# Patient Record
Sex: MALE | Race: WHITE | Employment: OTHER | URBAN - NONMETROPOLITAN AREA
[De-identification: names, ages, dates, MRNs, and addresses within clinical notes are randomized per-mention and may not be internally consistent; named-entity substitution may affect disease eponyms.]

---

## 2017-01-03 RX ORDER — DOFETILIDE 0.25 MG/1
CAPSULE ORAL
Qty: 360 CAPSULE | Refills: 3 | Status: SHIPPED | OUTPATIENT
Start: 2017-01-03 | End: 2018-01-15 | Stop reason: SDUPTHER

## 2017-02-02 ENCOUNTER — OFFICE VISIT (OUTPATIENT)
Dept: URGENT CARE | Age: 62
End: 2017-02-02
Payer: COMMERCIAL

## 2017-02-02 VITALS
HEART RATE: 84 BPM | DIASTOLIC BLOOD PRESSURE: 83 MMHG | WEIGHT: 299 LBS | RESPIRATION RATE: 20 BRPM | OXYGEN SATURATION: 98 % | HEIGHT: 71 IN | BODY MASS INDEX: 41.86 KG/M2 | SYSTOLIC BLOOD PRESSURE: 127 MMHG | TEMPERATURE: 97.9 F

## 2017-02-02 DIAGNOSIS — R05.9 COUGH: ICD-10-CM

## 2017-02-02 DIAGNOSIS — J01.40 SUBACUTE PANSINUSITIS: Primary | ICD-10-CM

## 2017-02-02 PROCEDURE — 99202 OFFICE O/P NEW SF 15 MIN: CPT | Performed by: NURSE PRACTITIONER

## 2017-02-02 RX ORDER — METOLAZONE 2.5 MG/1
2.5 TABLET ORAL DAILY
COMMUNITY
End: 2017-12-25 | Stop reason: SDUPTHER

## 2017-02-02 RX ORDER — DOFETILIDE 0.25 MG/1
500 CAPSULE ORAL 2 TIMES DAILY
COMMUNITY

## 2017-02-02 RX ORDER — FUROSEMIDE 40 MG/1
40 TABLET ORAL 2 TIMES DAILY
COMMUNITY
End: 2018-07-10 | Stop reason: SDUPTHER

## 2017-02-02 RX ORDER — PROPYLTHIOURACIL 50 MG/1
TABLET ORAL 3 TIMES DAILY
COMMUNITY
End: 2017-10-31 | Stop reason: SDUPTHER

## 2017-02-02 RX ORDER — SPIRONOLACTONE 25 MG/1
25 TABLET ORAL DAILY
COMMUNITY
End: 2017-07-16 | Stop reason: SDUPTHER

## 2017-02-02 RX ORDER — WARFARIN SODIUM 5 MG/1
5 TABLET ORAL
COMMUNITY
End: 2017-07-26

## 2017-02-02 RX ORDER — METHYLPREDNISOLONE 4 MG/1
TABLET ORAL
Qty: 1 KIT | Refills: 0 | Status: SHIPPED | OUTPATIENT
Start: 2017-02-02 | End: 2017-02-08

## 2017-02-02 RX ORDER — LEVALBUTEROL INHALATION SOLUTION 1.25 MG/3ML
1 SOLUTION RESPIRATORY (INHALATION) EVERY 4 HOURS PRN
COMMUNITY

## 2017-02-02 RX ORDER — CEFDINIR 300 MG/1
300 CAPSULE ORAL 2 TIMES DAILY
Qty: 20 CAPSULE | Refills: 0 | Status: SHIPPED | OUTPATIENT
Start: 2017-02-02 | End: 2017-02-12

## 2017-02-02 RX ORDER — AMLODIPINE BESYLATE 2.5 MG/1
2.5 TABLET ORAL DAILY
COMMUNITY
End: 2017-10-31

## 2017-02-02 RX ORDER — METOPROLOL SUCCINATE 50 MG/1
100 TABLET, EXTENDED RELEASE ORAL 2 TIMES DAILY
COMMUNITY

## 2017-02-02 RX ORDER — ATORVASTATIN CALCIUM 20 MG/1
20 TABLET, FILM COATED ORAL DAILY
COMMUNITY
End: 2017-09-27 | Stop reason: SDUPTHER

## 2017-02-02 RX ORDER — POTASSIUM CHLORIDE 750 MG/1
10 TABLET, FILM COATED, EXTENDED RELEASE ORAL DAILY
COMMUNITY
End: 2017-08-01 | Stop reason: SDUPTHER

## 2017-02-02 RX ORDER — LATANOPROST 50 UG/ML
1 SOLUTION/ DROPS OPHTHALMIC NIGHTLY
COMMUNITY

## 2017-02-02 RX ORDER — DESONIDE 0.5 MG/G
CREAM TOPICAL 2 TIMES DAILY
COMMUNITY
End: 2018-12-05 | Stop reason: SDUPTHER

## 2017-02-02 RX ORDER — BENZONATATE 100 MG/1
100 CAPSULE ORAL 3 TIMES DAILY PRN
Qty: 21 CAPSULE | Refills: 0 | Status: SHIPPED | OUTPATIENT
Start: 2017-02-02 | End: 2017-02-09

## 2017-02-02 RX ORDER — LEVALBUTEROL TARTRATE 45 UG/1
1-2 AEROSOL, METERED ORAL EVERY 4 HOURS PRN
COMMUNITY
End: 2017-10-31

## 2017-02-02 RX ORDER — CITALOPRAM 20 MG/1
20 TABLET ORAL DAILY
COMMUNITY
End: 2017-11-06 | Stop reason: SDUPTHER

## 2017-02-02 ASSESSMENT — ENCOUNTER SYMPTOMS
VOMITING: 0
SINUS PRESSURE: 1
NAUSEA: 0
COUGH: 1
DIARRHEA: 0

## 2017-05-22 LAB
INR BLD: 2.34 (ref 0.88–1.18)
PROTHROMBIN TIME: 25.9 SEC (ref 12–14.6)

## 2017-06-13 LAB
INR BLD: 3.08 (ref 0.88–1.18)
PROTHROMBIN TIME: 32.2 SEC (ref 12–14.6)

## 2017-07-05 ENCOUNTER — NURSE ONLY (OUTPATIENT)
Dept: INTERNAL MEDICINE | Age: 62
End: 2017-07-05
Payer: COMMERCIAL

## 2017-07-05 DIAGNOSIS — Z79.01 LONG TERM (CURRENT) USE OF ANTICOAGULANTS: ICD-10-CM

## 2017-07-05 LAB
INTERNATIONAL NORMALIZATION RATIO, POC: 3.9
PROTHROMBIN TIME, POC: NORMAL

## 2017-07-05 PROCEDURE — 85610 PROTHROMBIN TIME: CPT | Performed by: INTERNAL MEDICINE

## 2017-07-25 RX ORDER — MAGNESIUM OXIDE 400 MG/1
400 TABLET ORAL 2 TIMES DAILY
COMMUNITY

## 2017-07-26 ENCOUNTER — ANTI-COAG VISIT (OUTPATIENT)
Dept: INTERNAL MEDICINE | Age: 62
End: 2017-07-26

## 2017-07-26 ENCOUNTER — OFFICE VISIT (OUTPATIENT)
Dept: INTERNAL MEDICINE | Age: 62
End: 2017-07-26
Payer: COMMERCIAL

## 2017-07-26 VITALS
HEIGHT: 71 IN | WEIGHT: 292 LBS | DIASTOLIC BLOOD PRESSURE: 84 MMHG | BODY MASS INDEX: 40.88 KG/M2 | SYSTOLIC BLOOD PRESSURE: 118 MMHG | OXYGEN SATURATION: 98 % | HEART RATE: 79 BPM

## 2017-07-26 DIAGNOSIS — E05.90 HYPERTHYROIDISM: ICD-10-CM

## 2017-07-26 DIAGNOSIS — I10 ESSENTIAL HYPERTENSION: ICD-10-CM

## 2017-07-26 DIAGNOSIS — I48.19 PERSISTENT ATRIAL FIBRILLATION (HCC): ICD-10-CM

## 2017-07-26 DIAGNOSIS — Z79.01 LONG TERM (CURRENT) USE OF ANTICOAGULANTS: ICD-10-CM

## 2017-07-26 DIAGNOSIS — I42.2 CARDIOMYOPATHY, HYPERTROPHIC (HCC): ICD-10-CM

## 2017-07-26 DIAGNOSIS — Z23 NEED FOR ZOSTAVAX ADMINISTRATION: Primary | ICD-10-CM

## 2017-07-26 LAB
ALBUMIN SERPL-MCNC: 4.2 G/DL (ref 3.5–5.2)
ALP BLD-CCNC: 74 U/L (ref 40–130)
ALT SERPL-CCNC: 28 U/L (ref 5–41)
ANION GAP SERPL CALCULATED.3IONS-SCNC: 18 MMOL/L (ref 7–19)
AST SERPL-CCNC: 40 U/L (ref 5–40)
BASOPHILS ABSOLUTE: 0.1 K/UL (ref 0–0.2)
BASOPHILS RELATIVE PERCENT: 1.3 % (ref 0–1)
BILIRUB SERPL-MCNC: 2.2 MG/DL (ref 0.2–1.2)
BUN BLDV-MCNC: 19 MG/DL (ref 8–23)
CALCIUM SERPL-MCNC: 9.7 MG/DL (ref 8.8–10.2)
CHLORIDE BLD-SCNC: 95 MMOL/L (ref 98–111)
CO2: 30 MMOL/L (ref 22–29)
CREAT SERPL-MCNC: 0.8 MG/DL (ref 0.5–1.2)
EOSINOPHILS ABSOLUTE: 0.2 K/UL (ref 0–0.6)
EOSINOPHILS RELATIVE PERCENT: 2.9 % (ref 0–5)
GFR NON-AFRICAN AMERICAN: >60
GLUCOSE BLD-MCNC: 93 MG/DL (ref 74–109)
HCT VFR BLD CALC: 45.1 % (ref 42–52)
HEMOGLOBIN: 15.7 G/DL (ref 14–18)
INR BLD: 3.33 (ref 0.88–1.18)
LDL CHOLESTEROL DIRECT: 56 MG/DL
LYMPHOCYTES ABSOLUTE: 1.1 K/UL (ref 1.1–4.5)
LYMPHOCYTES RELATIVE PERCENT: 17 % (ref 20–40)
MCH RBC QN AUTO: 32 PG (ref 27–31)
MCHC RBC AUTO-ENTMCNC: 34.8 G/DL (ref 33–37)
MCV RBC AUTO: 91.9 FL (ref 80–94)
MONOCYTES ABSOLUTE: 0.8 K/UL (ref 0–0.9)
MONOCYTES RELATIVE PERCENT: 12.2 % (ref 0–10)
NEUTROPHILS ABSOLUTE: 4.2 K/UL (ref 1.5–7.5)
NEUTROPHILS RELATIVE PERCENT: 66.1 % (ref 50–65)
PDW BLD-RTO: 14.1 % (ref 11.5–14.5)
PLATELET # BLD: 199 K/UL (ref 130–400)
PMV BLD AUTO: 10.4 FL (ref 9.4–12.4)
POTASSIUM SERPL-SCNC: 3.2 MMOL/L (ref 3.5–5)
PROTHROMBIN TIME: 34.3 SEC (ref 12–14.6)
RBC # BLD: 4.91 M/UL (ref 4.7–6.1)
SODIUM BLD-SCNC: 143 MMOL/L (ref 136–145)
TOTAL PROTEIN: 7.7 G/DL (ref 6.6–8.7)
TSH SERPL DL<=0.05 MIU/L-ACNC: 0.91 UIU/ML (ref 0.27–4.2)
WBC # BLD: 6.3 K/UL (ref 4.8–10.8)

## 2017-07-26 PROCEDURE — 99214 OFFICE O/P EST MOD 30 MIN: CPT | Performed by: INTERNAL MEDICINE

## 2017-07-26 PROCEDURE — 90471 IMMUNIZATION ADMIN: CPT | Performed by: INTERNAL MEDICINE

## 2017-07-26 PROCEDURE — 90736 HZV VACCINE LIVE SUBQ: CPT | Performed by: INTERNAL MEDICINE

## 2017-07-26 RX ORDER — WARFARIN SODIUM 2.5 MG/1
2.5 TABLET ORAL
COMMUNITY
End: 2017-09-20 | Stop reason: SDUPTHER

## 2017-07-26 RX ORDER — CHOLECALCIFEROL (VITAMIN D3) 125 MCG
5 CAPSULE ORAL DAILY
COMMUNITY

## 2017-07-26 ASSESSMENT — ENCOUNTER SYMPTOMS
SORE THROAT: 0
COUGH: 0
NAUSEA: 0
ABDOMINAL PAIN: 0
RHINORRHEA: 0
TROUBLE SWALLOWING: 0
SHORTNESS OF BREATH: 1

## 2017-07-26 ASSESSMENT — PATIENT HEALTH QUESTIONNAIRE - PHQ9
SUM OF ALL RESPONSES TO PHQ9 QUESTIONS 1 & 2: 0
1. LITTLE INTEREST OR PLEASURE IN DOING THINGS: 0
SUM OF ALL RESPONSES TO PHQ QUESTIONS 1-9: 0
2. FEELING DOWN, DEPRESSED OR HOPELESS: 0

## 2017-08-01 RX ORDER — POTASSIUM CHLORIDE 750 MG/1
TABLET, FILM COATED, EXTENDED RELEASE ORAL
Qty: 180 TABLET | Refills: 5 | Status: SHIPPED | OUTPATIENT
Start: 2017-08-01 | End: 2017-12-13 | Stop reason: SDUPTHER

## 2017-08-11 ENCOUNTER — OFFICE VISIT (OUTPATIENT)
Dept: INTERNAL MEDICINE | Age: 62
End: 2017-08-11
Payer: COMMERCIAL

## 2017-08-11 ENCOUNTER — HOSPITAL ENCOUNTER (OUTPATIENT)
Dept: NON INVASIVE DIAGNOSTICS | Age: 62
Discharge: HOME | End: 2017-08-11
Payer: COMMERCIAL

## 2017-08-11 VITALS
TEMPERATURE: 96.7 F | BODY MASS INDEX: 42.06 KG/M2 | WEIGHT: 300.4 LBS | RESPIRATION RATE: 18 BRPM | HEART RATE: 78 BPM | DIASTOLIC BLOOD PRESSURE: 84 MMHG | HEIGHT: 71 IN | SYSTOLIC BLOOD PRESSURE: 132 MMHG | OXYGEN SATURATION: 96 %

## 2017-08-11 DIAGNOSIS — M79.606 PAIN OF LOWER EXTREMITY, UNSPECIFIED LATERALITY: ICD-10-CM

## 2017-08-11 DIAGNOSIS — M79.89 LEG SWELLING: Primary | ICD-10-CM

## 2017-08-11 PROCEDURE — 99214 OFFICE O/P EST MOD 30 MIN: CPT | Performed by: NURSE PRACTITIONER

## 2017-08-11 PROCEDURE — 93971 EXTREMITY STUDY: CPT

## 2017-08-11 RX ORDER — CEFDINIR 300 MG/1
300 CAPSULE ORAL 2 TIMES DAILY
Qty: 14 CAPSULE | Refills: 0 | Status: SHIPPED | OUTPATIENT
Start: 2017-08-11 | End: 2017-08-18

## 2017-08-11 ASSESSMENT — ENCOUNTER SYMPTOMS
CHOKING: 0
COLOR CHANGE: 1
CONSTIPATION: 0
ABDOMINAL PAIN: 0
BLOOD IN STOOL: 0
DIARRHEA: 0
ABDOMINAL DISTENTION: 0
COUGH: 0
EYE DISCHARGE: 0
WHEEZING: 0
NAUSEA: 0
TROUBLE SWALLOWING: 0
STRIDOR: 0
EYE ITCHING: 0
SORE THROAT: 0
VOMITING: 0

## 2017-08-15 ENCOUNTER — ANTI-COAG VISIT (OUTPATIENT)
Dept: INTERNAL MEDICINE | Age: 62
End: 2017-08-15

## 2017-08-15 ENCOUNTER — NURSE ONLY (OUTPATIENT)
Dept: INTERNAL MEDICINE | Age: 62
End: 2017-08-15
Payer: COMMERCIAL

## 2017-08-15 DIAGNOSIS — Z79.01 LONG TERM (CURRENT) USE OF ANTICOAGULANTS: Primary | ICD-10-CM

## 2017-08-15 DIAGNOSIS — Z95.2 MITRAL VALVE REPLACED: ICD-10-CM

## 2017-08-15 DIAGNOSIS — Z79.01 LONG TERM (CURRENT) USE OF ANTICOAGULANTS: ICD-10-CM

## 2017-08-15 LAB
INTERNATIONAL NORMALIZATION RATIO, POC: 3
PROTHROMBIN TIME, POC: NORMAL

## 2017-08-15 PROCEDURE — 85610 PROTHROMBIN TIME: CPT | Performed by: INTERNAL MEDICINE

## 2017-08-15 RX ORDER — METOPROLOL SUCCINATE 50 MG/1
75 TABLET, EXTENDED RELEASE ORAL 2 TIMES DAILY
COMMUNITY

## 2017-08-15 RX ORDER — DESONIDE 0.5 MG/G
1 CREAM TOPICAL 2 TIMES DAILY
COMMUNITY

## 2017-08-15 RX ORDER — POTASSIUM CHLORIDE 20 MEQ/1
20 TABLET, EXTENDED RELEASE ORAL 2 TIMES DAILY
COMMUNITY

## 2017-08-15 RX ORDER — WARFARIN SODIUM 2 MG/1
2.5 TABLET ORAL
COMMUNITY

## 2017-08-15 RX ORDER — PROPYLTHIOURACIL 50 MG/1
50 TABLET ORAL DAILY
COMMUNITY

## 2017-08-15 RX ORDER — ATORVASTATIN CALCIUM 20 MG/1
20 TABLET, FILM COATED ORAL DAILY
COMMUNITY

## 2017-08-15 RX ORDER — LEVALBUTEROL TARTRATE 45 UG/1
1-2 AEROSOL, METERED ORAL EVERY 4 HOURS PRN
COMMUNITY

## 2017-08-15 RX ORDER — SPIRONOLACTONE 25 MG/1
25 TABLET ORAL DAILY
COMMUNITY

## 2017-08-15 RX ORDER — LATANOPROST 50 UG/ML
1 SOLUTION/ DROPS OPHTHALMIC NIGHTLY
COMMUNITY

## 2017-08-15 RX ORDER — METOLAZONE 2.5 MG/1
2.5 TABLET ORAL DAILY
COMMUNITY

## 2017-08-15 RX ORDER — FUROSEMIDE 80 MG
80 TABLET ORAL DAILY
COMMUNITY

## 2017-08-15 RX ORDER — AMLODIPINE BESYLATE 5 MG/1
5 TABLET ORAL DAILY
COMMUNITY
End: 2017-08-17 | Stop reason: ALTCHOICE

## 2017-08-15 RX ORDER — CITALOPRAM 20 MG/1
20 TABLET ORAL DAILY
COMMUNITY

## 2017-08-17 ENCOUNTER — OFFICE VISIT (OUTPATIENT)
Dept: CARDIOLOGY | Facility: CLINIC | Age: 62
End: 2017-08-17

## 2017-08-17 VITALS
BODY MASS INDEX: 40.32 KG/M2 | SYSTOLIC BLOOD PRESSURE: 110 MMHG | DIASTOLIC BLOOD PRESSURE: 82 MMHG | HEART RATE: 82 BPM | WEIGHT: 288 LBS | HEIGHT: 71 IN | OXYGEN SATURATION: 100 %

## 2017-08-17 DIAGNOSIS — I48.20 CHRONIC ATRIAL FIBRILLATION (HCC): ICD-10-CM

## 2017-08-17 DIAGNOSIS — R42 DIZZINESS: ICD-10-CM

## 2017-08-17 DIAGNOSIS — R06.02 SHORTNESS OF BREATH: ICD-10-CM

## 2017-08-17 DIAGNOSIS — I42.2 HYPERTROPHIC CARDIOMYOPATHY (HCC): Primary | ICD-10-CM

## 2017-08-17 PROCEDURE — 99214 OFFICE O/P EST MOD 30 MIN: CPT | Performed by: INTERNAL MEDICINE

## 2017-08-17 PROCEDURE — 93000 ELECTROCARDIOGRAM COMPLETE: CPT | Performed by: INTERNAL MEDICINE

## 2017-08-17 RX ORDER — PHENOL 1.4 %
10 AEROSOL, SPRAY (ML) MUCOUS MEMBRANE NIGHTLY
COMMUNITY

## 2017-08-17 NOTE — PROGRESS NOTES
Subjective:     Encounter Date: 08/17/2017      Patient ID: Pranay Gutierrez is a 61 y.o. male.With a history of hypertrophic cardiomyopathy, chronic atrial fibrillation, status post multiple previous ablations, followed by Dr. Perez, pacemaker in place that is also followed by Dr. Perez, history of mitral valve replacement, on chronic Coumadin who presents today for follow-up.    Chief Complaint: Routine follow-up    Atrial Fibrillation   Presents for follow-up visit. Symptoms include dizziness, palpitations and shortness of breath. Symptoms are negative for chest pain, hemodynamic instability, pacemaker problem, syncope and weakness. The symptoms have been stable. Past medical history includes atrial fibrillation. There are no medication compliance problems.   Shortness of Breath   This is a chronic problem. The current episode started more than 1 month ago. The problem has been gradually worsening. Pertinent negatives include no abdominal pain, chest pain, claudication, fever, headaches, hemoptysis, leg swelling, neck pain, orthopnea, PND, sore throat, syncope, vomiting or wheezing. The symptoms are aggravated by exercise. He has tried rest for the symptoms. The treatment provided significant relief. His past medical history is significant for chronic lung disease. There is no history of a heart failure.   Dizziness   This is a new problem. The current episode started more than 1 month ago. The problem occurs intermittently. The problem has been waxing and waning. Pertinent negatives include no abdominal pain, chest pain, chills, congestion, coughing, diaphoresis, fatigue, fever, headaches, nausea, neck pain, numbness, sore throat, vertigo, visual change, vomiting or weakness. The symptoms are aggravated by exertion. He has tried rest for the symptoms.      The patient presents today for routine follow-up.  He was last evaluated in this office approximately one year ago.  Since that time, the patient has had  progressive shortness of breath and dyspnea on exertion as well as intermittent lightheadedness and dizziness which are new complaints since last year.  The patient denies any syncopal or so.  The patient has occasional palpitations but long periods of tachyarrhythmias per his knowledge.  He has been compliant with all of his medications including anticoagulation which he takes given a history of a mitral valve replacement.  He has not had any difficulties with his pacemaker.  He denies any significant change in baseline mild lower extremity edema which occurs intermittently.  His weight is been relatively stable.  He denies any chest pain.      Current Outpatient Prescriptions:   •  atorvastatin (LIPITOR) 20 MG tablet, Take 20 mg by mouth Daily., Disp: , Rfl:   •  citalopram (CeleXA) 20 MG tablet, Take 20 mg by mouth Daily., Disp: , Rfl:   •  desonide (DESOWEN) 0.05 % cream, Apply 1 application topically 2 (Two) Times a Day., Disp: , Rfl:   •  dofetilide (TIKOSYN) 250 MCG capsule, TAKE 2 CAPSULES BY MOUTH 2 TIMES DAILY, Disp: 360 capsule, Rfl: 3  •  furosemide (LASIX) 80 MG tablet, Take 80 mg by mouth Daily., Disp: , Rfl:   •  latanoprost (XALATAN) 0.005 % ophthalmic solution, 1 drop Every Night., Disp: , Rfl:   •  levalbuterol (XOPENEX HFA) 45 MCG/ACT inhaler, Inhale 1-2 puffs Every 4 (Four) Hours As Needed for Wheezing., Disp: , Rfl:   •  magnesium oxide (MAGOX) 400 (241.3 Mg) MG tablet tablet, Take 400 mg by mouth 2 (Two) Times a Day., Disp: , Rfl:   •  Melatonin 10 MG tablet, Take 10 mg by mouth Every Night., Disp: , Rfl:   •  metOLazone (ZAROXOLYN) 2.5 MG tablet, Take 2.5 mg by mouth Daily. 1 TABLET PO EVERY Monday, Wednesday & Friday, Disp: , Rfl:   •  metoprolol succinate XL (TOPROL-XL) 50 MG 24 hr tablet, Take 75 mg by mouth 2 (Two) Times a Day., Disp: , Rfl:   •  Mometasone Furoate (ASMANEX HFA) 200 MCG/ACT aerosol, Inhale 2 puffs 2 (Two) Times a Day., Disp: , Rfl:   •  potassium chloride (K-DUR,KLOR-CON)  20 MEQ CR tablet, Take 20 mEq by mouth 2 (Two) Times a Day. 30 mg am & 30 mg pm, Disp: , Rfl:   •  propylthiouracil (PTU) 50 MG tablet, Take 50 mg by mouth Daily., Disp: , Rfl:   •  spironolactone (ALDACTONE) 25 MG tablet, Take 25 mg by mouth Daily., Disp: , Rfl:   •  warfarin (COUMADIN) 2 MG tablet, Take 2 mg by mouth Daily., Disp: , Rfl:     Allergies   Allergen Reactions   • Penicillins    • Sulfa Antibiotics      Social History   Substance Use Topics   • Smoking status: Never Smoker   • Smokeless tobacco: Never Used   • Alcohol use No     Review of Systems   Constitution: Negative for chills, diaphoresis, fatigue, fever, weakness, night sweats and weight loss.   HENT: Negative for congestion, headaches and sore throat.    Cardiovascular: Positive for dyspnea on exertion and palpitations. Negative for chest pain, claudication, irregular heartbeat, leg swelling, orthopnea, paroxysmal nocturnal dyspnea and syncope.   Respiratory: Positive for shortness of breath. Negative for cough, hemoptysis and wheezing.    Endocrine: Negative for cold intolerance, heat intolerance, polydipsia and polyuria.   Hematologic/Lymphatic: Negative for bleeding problem. Does not bruise/bleed easily.   Musculoskeletal: Negative for neck pain.   Gastrointestinal: Negative for abdominal pain, hematemesis, hematochezia, melena, nausea and vomiting.   Genitourinary: Negative for bladder incontinence, dysuria and hematuria.   Neurological: Positive for dizziness and light-headedness. Negative for loss of balance, numbness, paresthesias, seizures and vertigo.       ECG 12 Lead  Date/Time: 8/17/2017 12:51 PM  Performed by: SOTO VILLA  Authorized by: SOTO VILAL   Rhythm: paced  BPM: 65  Clinical impression: abnormal ECG             Objective:     Physical Exam   Constitutional: He is oriented to person, place, and time. He appears well-developed and well-nourished. No distress.   HENT:   Head: Normocephalic and  "atraumatic.   Mouth/Throat: Oropharynx is clear and moist.   Eyes: EOM are normal. Pupils are equal, round, and reactive to light.   Neck: Normal range of motion. Neck supple. No JVD present. No thyromegaly present.   Cardiovascular: Normal rate, regular rhythm, S1 normal, S2 normal, normal heart sounds and intact distal pulses.  Exam reveals no gallop and no friction rub.    No murmur heard.  Pulmonary/Chest: Effort normal and breath sounds normal.   Abdominal: Soft. Bowel sounds are normal. He exhibits no distension. There is no tenderness.   Musculoskeletal: Normal range of motion. He exhibits no edema.   Neurological: He is alert and oriented to person, place, and time. No cranial nerve deficit.   Skin: Skin is warm and dry. No rash noted. No cyanosis or erythema. Nails show no clubbing.   Psychiatric: He has a normal mood and affect.   Vitals reviewed.    /82 (BP Location: Left arm, Patient Position: Sitting)  Pulse 82  Ht 71\" (180.3 cm)  Wt 288 lb (131 kg)  SpO2 100%  BMI 40.17 kg/m2    Data/Lab Review:     Echocardiogram on 8/23/16: Preserved left ventricular ejection fraction with evidence of diastolic dysfunction and septal hypertrophy, mild tricuspid regurgitation, mitral valve gradient within normal limits.      Assessment:          Diagnosis Plan   1. Hypertrophic cardiomyopathy  Adult Transthoracic Echo Complete With Contrast   2. Dizziness     3. Shortness of breath     4. Chronic atrial fibrillation  ECG 12 Lead        Plan:       1.  Hypertrophic cardiomyopathy: Since the last office visit with this patient approximately one year ago, he has developed progressive shortness of breath and intermittent dizziness.  With his history of hypertrophic cardiomyopathy, there is concern for worsening left ventricular outflow obstruction.  Therefore, think it is reasonable to repeat an echocardiogram.  His most recent echocardiogram last year is referenced above.  Further plans will be pending the " results of this echocardiogram.    2.  Dizziness: Until proven otherwise, I would think that it is possible that the patient's dizziness may be related to his history of hypertrophic cardiomyopathy and potential left ventricular outflow obstruction.  No murmur was auscultated on examination today.  We will check an echocardiogram as noted above.  She also be noted that given the patient's chronic atrial fibrillation and on Tikosyn therapy, the patient will follow-up with Dr. Perez soon.  I am not certain that the patient's atrial fibrillation has any significant effect on the patient's recent dizziness.    3.  Shortness of breath: The patient has increasing shortness of breath and dyspnea on exertion.  His lungs are clear on examination today and I do not auscultate a murmur.  However with this patient's history of hypertrophic artery myopathy and will repeat an echocardiogram to evaluate further.  Patient also has history of mitral valve replacement so this should also be evaluated although at last check, the gradient across the patient's mitral valve was appropriate.    4.  Chronic atrial fibrillation: As stated patient has follow-up with Dr. Perez in the near future.  He has had multiple ablations done, most recently at the [facility].  The patient also has a pacemaker that is followed by Dr. Perez.    Follow-up: 12 months unless needed sooner based on the results the patient's echocardiogram or if symptoms worsen.    EMR Dragon/Transcription disclaimer: Much of this encounter note is an electronic transcription/translation of spoken language to printed text. The electronic translation of spoken language may permit erroneous, or at times, nonsensical words or phrases to be inadvertently transcribed; although I have reviewed the note for such errors, some may still exist.

## 2017-08-25 ENCOUNTER — HOSPITAL ENCOUNTER (OUTPATIENT)
Dept: CARDIOLOGY | Facility: HOSPITAL | Age: 62
Discharge: HOME | End: 2017-08-25
Attending: INTERNAL MEDICINE | Admitting: INTERNAL MEDICINE

## 2017-08-25 VITALS
BODY MASS INDEX: 40.32 KG/M2 | DIASTOLIC BLOOD PRESSURE: 81 MMHG | HEIGHT: 71 IN | WEIGHT: 288 LBS | SYSTOLIC BLOOD PRESSURE: 127 MMHG

## 2017-08-25 DIAGNOSIS — I42.2 HYPERTROPHIC CARDIOMYOPATHY (HCC): ICD-10-CM

## 2017-08-25 PROCEDURE — 25010000002 PERFLUTREN 6.52 MG/ML SUSPENSION: Performed by: INTERNAL MEDICINE

## 2017-08-25 PROCEDURE — C8929 TTE W OR WO FOL WCON,DOPPLER: HCPCS

## 2017-08-25 PROCEDURE — 93306 TTE W/DOPPLER COMPLETE: CPT | Performed by: INTERNAL MEDICINE

## 2017-08-25 RX ADMIN — PERFLUTREN 8.48 MG: 6.52 INJECTION, SUSPENSION INTRAVENOUS at 10:16

## 2017-08-28 LAB
BH CV ECHO MEAS - AO MAX PG (FULL): 2.1 MMHG
BH CV ECHO MEAS - AO MAX PG: 5.6 MMHG
BH CV ECHO MEAS - AO MEAN PG (FULL): 1 MMHG
BH CV ECHO MEAS - AO MEAN PG: 3 MMHG
BH CV ECHO MEAS - AO ROOT AREA (BSA CORRECTED): 1.2
BH CV ECHO MEAS - AO ROOT AREA: 6.6 CM^2
BH CV ECHO MEAS - AO ROOT DIAM: 2.9 CM
BH CV ECHO MEAS - AO V2 MAX: 118 CM/SEC
BH CV ECHO MEAS - AO V2 MEAN: 82 CM/SEC
BH CV ECHO MEAS - AO V2 VTI: 25.2 CM
BH CV ECHO MEAS - AVA(I,A): 2.2 CM^2
BH CV ECHO MEAS - AVA(I,D): 2.2 CM^2
BH CV ECHO MEAS - AVA(V,A): 2.2 CM^2
BH CV ECHO MEAS - AVA(V,D): 2.2 CM^2
BH CV ECHO MEAS - BSA(HAYCOCK): 2.6 M^2
BH CV ECHO MEAS - BSA: 2.5 M^2
BH CV ECHO MEAS - BZI_BMI: 40.2 KILOGRAMS/M^2
BH CV ECHO MEAS - BZI_METRIC_HEIGHT: 180.3 CM
BH CV ECHO MEAS - BZI_METRIC_WEIGHT: 130.6 KG
BH CV ECHO MEAS - CONTRAST EF 4CH: 40.5 ML/M^2
BH CV ECHO MEAS - EDV(CUBED): 9.9 ML
BH CV ECHO MEAS - EDV(MOD-SP4): 139 ML
BH CV ECHO MEAS - EDV(TEICH): 15.2 ML
BH CV ECHO MEAS - EF(CUBED): 70 %
BH CV ECHO MEAS - EF(TEICH): 64.5 %
BH CV ECHO MEAS - ESV(CUBED): 3 ML
BH CV ECHO MEAS - ESV(MOD-SP4): 82.7 ML
BH CV ECHO MEAS - ESV(TEICH): 5.4 ML
BH CV ECHO MEAS - FS: 33.1 %
BH CV ECHO MEAS - IVS/LVPW: 3.6
BH CV ECHO MEAS - IVSD: 3.9 CM
BH CV ECHO MEAS - LA DIMENSION: 4.4 CM
BH CV ECHO MEAS - LA/AO: 1.5
BH CV ECHO MEAS - LAT PEAK E' VEL: 5.9 CM/SEC
BH CV ECHO MEAS - LV DIASTOLIC VOL/BSA (35-75): 56.4 ML/M^2
BH CV ECHO MEAS - LV MASS(C)D: 290.2 GRAMS
BH CV ECHO MEAS - LV MASS(C)DI: 117.9 GRAMS/M^2
BH CV ECHO MEAS - LV MAX PG: 3.5 MMHG
BH CV ECHO MEAS - LV MEAN PG: 2 MMHG
BH CV ECHO MEAS - LV SYSTOLIC VOL/BSA (12-30): 33.6 ML/M^2
BH CV ECHO MEAS - LV V1 MAX: 93.5 CM/SEC
BH CV ECHO MEAS - LV V1 MEAN: 63.4 CM/SEC
BH CV ECHO MEAS - LV V1 VTI: 19.9 CM
BH CV ECHO MEAS - LVIDD: 2.1 CM
BH CV ECHO MEAS - LVIDS: 1.4 CM
BH CV ECHO MEAS - LVLD AP4: 8.4 CM
BH CV ECHO MEAS - LVLS AP4: 8.1 CM
BH CV ECHO MEAS - LVOT AREA (M): 2.8 CM^2
BH CV ECHO MEAS - LVOT AREA: 2.8 CM^2
BH CV ECHO MEAS - LVOT DIAM: 1.9 CM
BH CV ECHO MEAS - LVPWD: 1.1 CM
BH CV ECHO MEAS - MED PEAK E' VEL: 4.9 CM/SEC
BH CV ECHO MEAS - MV DEC SLOPE: 802 CM/SEC^2
BH CV ECHO MEAS - MV DEC TIME: 0.21 SEC
BH CV ECHO MEAS - MV E MAX VEL: 155 CM/SEC
BH CV ECHO MEAS - MV MAX PG: 9.9 MMHG
BH CV ECHO MEAS - MV MEAN PG: 4 MMHG
BH CV ECHO MEAS - MV P1/2T MAX VEL: 174 CM/SEC
BH CV ECHO MEAS - MV P1/2T: 63.5 MSEC
BH CV ECHO MEAS - MV V2 MAX: 157 CM/SEC
BH CV ECHO MEAS - MV V2 MEAN: 88.5 CM/SEC
BH CV ECHO MEAS - MV V2 VTI: 27.9 CM
BH CV ECHO MEAS - MVA P1/2T LCG: 1.3 CM^2
BH CV ECHO MEAS - MVA(P1/2T): 3.5 CM^2
BH CV ECHO MEAS - MVA(VTI): 2 CM^2
BH CV ECHO MEAS - RAP SYSTOLE: 5 MMHG
BH CV ECHO MEAS - RVDD: 1.4 CM
BH CV ECHO MEAS - RVSP: 28.4 MMHG
BH CV ECHO MEAS - SI(AO): 67.6 ML/M^2
BH CV ECHO MEAS - SI(CUBED): 2.8 ML/M^2
BH CV ECHO MEAS - SI(LVOT): 22.9 ML/M^2
BH CV ECHO MEAS - SI(MOD-SP4): 22.9 ML/M^2
BH CV ECHO MEAS - SI(TEICH): 4 ML/M^2
BH CV ECHO MEAS - SV(AO): 166.5 ML
BH CV ECHO MEAS - SV(CUBED): 6.9 ML
BH CV ECHO MEAS - SV(LVOT): 56.4 ML
BH CV ECHO MEAS - SV(MOD-SP4): 56.3 ML
BH CV ECHO MEAS - SV(TEICH): 9.8 ML
BH CV ECHO MEAS - TR MAX VEL: 242 CM/SEC
E/E' RATIO: 31.6
LEFT ATRIUM VOLUME INDEX: 43.1 ML/M2
LEFT ATRIUM VOLUME: 106 CM3

## 2017-09-06 ENCOUNTER — NURSE ONLY (OUTPATIENT)
Dept: INTERNAL MEDICINE | Age: 62
End: 2017-09-06
Payer: COMMERCIAL

## 2017-09-06 ENCOUNTER — ANTI-COAG VISIT (OUTPATIENT)
Dept: INTERNAL MEDICINE | Age: 62
End: 2017-09-06

## 2017-09-06 DIAGNOSIS — Z79.01 LONG TERM (CURRENT) USE OF ANTICOAGULANTS: ICD-10-CM

## 2017-09-06 DIAGNOSIS — Z79.01 LONG TERM (CURRENT) USE OF ANTICOAGULANTS: Primary | ICD-10-CM

## 2017-09-06 LAB
INTERNATIONAL NORMALIZATION RATIO, POC: 4.4
PROTHROMBIN TIME, POC: NORMAL

## 2017-09-06 PROCEDURE — 85610 PROTHROMBIN TIME: CPT | Performed by: INTERNAL MEDICINE

## 2017-09-20 ENCOUNTER — ANTI-COAG VISIT (OUTPATIENT)
Dept: INTERNAL MEDICINE | Age: 62
End: 2017-09-20

## 2017-09-20 ENCOUNTER — NURSE ONLY (OUTPATIENT)
Dept: INTERNAL MEDICINE | Age: 62
End: 2017-09-20
Payer: COMMERCIAL

## 2017-09-20 DIAGNOSIS — Z79.01 LONG TERM (CURRENT) USE OF ANTICOAGULANTS: Primary | ICD-10-CM

## 2017-09-20 DIAGNOSIS — Z95.2 MITRAL VALVE REPLACED: ICD-10-CM

## 2017-09-20 DIAGNOSIS — Z79.01 LONG TERM (CURRENT) USE OF ANTICOAGULANTS: ICD-10-CM

## 2017-09-20 LAB
INTERNATIONAL NORMALIZATION RATIO, POC: 3.8
PROTHROMBIN TIME, POC: NORMAL

## 2017-09-20 PROCEDURE — 85610 PROTHROMBIN TIME: CPT | Performed by: INTERNAL MEDICINE

## 2017-09-21 RX ORDER — WARFARIN SODIUM 2.5 MG/1
TABLET ORAL
Qty: 90 TABLET | Refills: 4 | Status: SHIPPED | OUTPATIENT
Start: 2017-09-21 | End: 2018-12-21 | Stop reason: SDUPTHER

## 2017-10-04 ENCOUNTER — TELEPHONE (OUTPATIENT)
Dept: INTERNAL MEDICINE | Age: 62
End: 2017-10-04

## 2017-10-05 ENCOUNTER — ANTI-COAG VISIT (OUTPATIENT)
Dept: INTERNAL MEDICINE | Age: 62
End: 2017-10-05

## 2017-10-05 ENCOUNTER — TELEPHONE (OUTPATIENT)
Dept: INTERNAL MEDICINE | Age: 62
End: 2017-10-05

## 2017-10-05 ENCOUNTER — NURSE ONLY (OUTPATIENT)
Dept: INTERNAL MEDICINE | Age: 62
End: 2017-10-05
Payer: COMMERCIAL

## 2017-10-05 DIAGNOSIS — Z79.01 LONG TERM (CURRENT) USE OF ANTICOAGULANTS: ICD-10-CM

## 2017-10-05 DIAGNOSIS — Z79.01 LONG TERM (CURRENT) USE OF ANTICOAGULANTS: Primary | ICD-10-CM

## 2017-10-05 DIAGNOSIS — Z95.2 MITRAL VALVE REPLACED: ICD-10-CM

## 2017-10-05 DIAGNOSIS — I48.91 ATRIAL FIBRILLATION, UNSPECIFIED TYPE (HCC): ICD-10-CM

## 2017-10-05 LAB
INTERNATIONAL NORMALIZATION RATIO, POC: 4
PROTHROMBIN TIME, POC: NORMAL

## 2017-10-05 PROCEDURE — 85610 PROTHROMBIN TIME: CPT | Performed by: INTERNAL MEDICINE

## 2017-10-18 DIAGNOSIS — Z79.01 LONG TERM CURRENT USE OF ANTICOAGULANT THERAPY: Primary | ICD-10-CM

## 2017-10-18 DIAGNOSIS — Z79.01 LONG TERM CURRENT USE OF ANTICOAGULANT THERAPY: ICD-10-CM

## 2017-10-18 LAB
INR BLD: 3.19 (ref 0.88–1.18)
PROTHROMBIN TIME: 33.1 SEC (ref 12–14.6)

## 2017-10-31 ENCOUNTER — OFFICE VISIT (OUTPATIENT)
Dept: INTERNAL MEDICINE | Age: 62
End: 2017-10-31
Payer: COMMERCIAL

## 2017-10-31 VITALS
BODY MASS INDEX: 44.56 KG/M2 | SYSTOLIC BLOOD PRESSURE: 132 MMHG | HEIGHT: 68 IN | WEIGHT: 294 LBS | DIASTOLIC BLOOD PRESSURE: 84 MMHG | OXYGEN SATURATION: 98 % | HEART RATE: 76 BPM

## 2017-10-31 DIAGNOSIS — Z95.2 MITRAL VALVE REPLACED: Primary | ICD-10-CM

## 2017-10-31 DIAGNOSIS — I48.19 PERSISTENT ATRIAL FIBRILLATION (HCC): ICD-10-CM

## 2017-10-31 DIAGNOSIS — K76.9 CHRONIC LIVER DISEASE: ICD-10-CM

## 2017-10-31 DIAGNOSIS — Z95.810 AICD (AUTOMATIC CARDIOVERTER/DEFIBRILLATOR) PRESENT: ICD-10-CM

## 2017-10-31 DIAGNOSIS — Z79.01 LONG TERM CURRENT USE OF ANTICOAGULANT THERAPY: ICD-10-CM

## 2017-10-31 DIAGNOSIS — E05.90 HYPERTHYROIDISM: ICD-10-CM

## 2017-10-31 DIAGNOSIS — Z95.2 MITRAL VALVE REPLACED: ICD-10-CM

## 2017-10-31 DIAGNOSIS — Z78.9 STATIN INTOLERANCE: ICD-10-CM

## 2017-10-31 LAB
ALBUMIN SERPL-MCNC: 4.5 G/DL (ref 3.5–5.2)
ALP BLD-CCNC: 78 U/L (ref 40–130)
ALT SERPL-CCNC: 23 U/L (ref 5–41)
ANION GAP SERPL CALCULATED.3IONS-SCNC: 15 MMOL/L (ref 7–19)
AST SERPL-CCNC: 39 U/L (ref 5–40)
BILIRUB SERPL-MCNC: 2.2 MG/DL (ref 0.2–1.2)
BUN BLDV-MCNC: 17 MG/DL (ref 8–23)
CALCIUM SERPL-MCNC: 9.9 MG/DL (ref 8.8–10.2)
CHLORIDE BLD-SCNC: 93 MMOL/L (ref 98–111)
CO2: 34 MMOL/L (ref 22–29)
CREAT SERPL-MCNC: 0.9 MG/DL (ref 0.5–1.2)
GFR NON-AFRICAN AMERICAN: >60
GLUCOSE BLD-MCNC: 84 MG/DL (ref 74–109)
HCT VFR BLD CALC: 44.8 % (ref 42–52)
HEMOGLOBIN: 14.9 G/DL (ref 14–18)
MCH RBC QN AUTO: 31.6 PG (ref 27–31)
MCHC RBC AUTO-ENTMCNC: 33.3 G/DL (ref 33–37)
MCV RBC AUTO: 94.9 FL (ref 80–94)
PDW BLD-RTO: 14 % (ref 11.5–14.5)
PLATELET # BLD: 214 K/UL (ref 130–400)
PMV BLD AUTO: 10.3 FL (ref 9.4–12.4)
POTASSIUM SERPL-SCNC: 3.3 MMOL/L (ref 3.5–5)
RBC # BLD: 4.72 M/UL (ref 4.7–6.1)
SODIUM BLD-SCNC: 142 MMOL/L (ref 136–145)
TOTAL PROTEIN: 7.8 G/DL (ref 6.6–8.7)
TSH SERPL DL<=0.05 MIU/L-ACNC: 0.99 UIU/ML (ref 0.27–4.2)
WBC # BLD: 7.2 K/UL (ref 4.8–10.8)

## 2017-10-31 PROCEDURE — 99214 OFFICE O/P EST MOD 30 MIN: CPT | Performed by: INTERNAL MEDICINE

## 2017-10-31 RX ORDER — PROPYLTHIOURACIL 50 MG/1
50 TABLET ORAL DAILY
Qty: 30 TABLET | Refills: 5
Start: 2017-10-31 | End: 2018-09-19 | Stop reason: SDUPTHER

## 2017-10-31 RX ORDER — PROPYLTHIOURACIL 50 MG/1
50 TABLET ORAL 3 TIMES DAILY
Qty: 30 TABLET | Refills: 5 | Status: SHIPPED | OUTPATIENT
Start: 2017-10-31 | End: 2017-10-31 | Stop reason: SDUPTHER

## 2017-10-31 ASSESSMENT — ENCOUNTER SYMPTOMS
TROUBLE SWALLOWING: 0
COUGH: 0
SHORTNESS OF BREATH: 1
RHINORRHEA: 0
NAUSEA: 0
ABDOMINAL PAIN: 0
SORE THROAT: 0

## 2017-10-31 NOTE — PATIENT INSTRUCTIONS
candy, desserts, and soda pop. Limit alcohol  · Limit alcohol to no more than 2 drinks a day for men and 1 drink a day for women. Too much alcohol can cause health problems. When should you call for help? Watch closely for changes in your health, and be sure to contact your doctor if:  · You would like help planning heart-healthy meals. Where can you learn more? Go to https://[employer].healthSun Number. org and sign in to your [employer] account. Enter V137 in the [employer] box to learn more about \"Heart-Healthy Diet: Care Instructions. \"     If you do not have an account, please click on the \"Sign Up Now\" link. Current as of: April 3, 2017  Content Version: 11.3  © 0627-1516 [email or web address]. Care instructions adapted under license by [facility] ([facility]). If you have questions about a medical condition or this instruction, always ask your healthcare professional. [place] [address] any warranty or liability for your use of this information.

## 2017-11-07 RX ORDER — CITALOPRAM 20 MG/1
TABLET ORAL
Qty: 30 TABLET | Refills: 4 | Status: SHIPPED | OUTPATIENT
Start: 2017-11-07 | End: 2018-04-09 | Stop reason: SDUPTHER

## 2017-11-21 ENCOUNTER — ANTI-COAG VISIT (OUTPATIENT)
Dept: INTERNAL MEDICINE | Age: 62
End: 2017-11-21

## 2017-11-21 ENCOUNTER — NURSE ONLY (OUTPATIENT)
Dept: INTERNAL MEDICINE | Age: 62
End: 2017-11-21
Payer: COMMERCIAL

## 2017-11-21 DIAGNOSIS — Z79.01 LONG TERM CURRENT USE OF ANTICOAGULANT THERAPY: ICD-10-CM

## 2017-11-21 DIAGNOSIS — I48.19 PERSISTENT ATRIAL FIBRILLATION (HCC): ICD-10-CM

## 2017-11-21 DIAGNOSIS — Z79.01 LONG TERM CURRENT USE OF ANTICOAGULANT THERAPY: Primary | ICD-10-CM

## 2017-11-21 LAB
INR BLD: 3
INTERNATIONAL NORMALIZATION RATIO, POC: NORMAL
PROTHROMBIN TIME, POC: 3

## 2017-11-21 PROCEDURE — 85610 PROTHROMBIN TIME: CPT | Performed by: INTERNAL MEDICINE

## 2017-12-11 ENCOUNTER — TELEPHONE (OUTPATIENT)
Dept: INTERNAL MEDICINE | Age: 62
End: 2017-12-11

## 2017-12-11 NOTE — TELEPHONE ENCOUNTER
I would just tell him to get some artificial tears, 3 or 4 times a day for now.   If he wants to run by here, one can look at them, to determine if he needs to see an ophthalmologist

## 2017-12-11 NOTE — TELEPHONE ENCOUNTER
On the corner of one of his eyes feels like a water blister, he can feel and see it when he blinks his eye, states is a little painful, went to a walk in clinic but they dont treat eyes, he wants to know if we can give him eye drops?  Or what to do

## 2017-12-12 ENCOUNTER — NURSE ONLY (OUTPATIENT)
Dept: INTERNAL MEDICINE | Age: 62
End: 2017-12-12
Payer: COMMERCIAL

## 2017-12-12 ENCOUNTER — ANTI-COAG VISIT (OUTPATIENT)
Dept: INTERNAL MEDICINE | Age: 62
End: 2017-12-12

## 2017-12-12 DIAGNOSIS — Z79.01 LONG TERM CURRENT USE OF ANTICOAGULANT THERAPY: Primary | ICD-10-CM

## 2017-12-12 DIAGNOSIS — Z79.01 LONG TERM CURRENT USE OF ANTICOAGULANT THERAPY: ICD-10-CM

## 2017-12-12 LAB
INTERNATIONAL NORMALIZATION RATIO, POC: 3.8
PROTHROMBIN TIME, POC: NORMAL

## 2017-12-12 PROCEDURE — 85610 PROTHROMBIN TIME: CPT | Performed by: INTERNAL MEDICINE

## 2017-12-12 NOTE — PROGRESS NOTES
Mr. Teresa Samuel was here today. INR today: 3.8       INR Goal: 2.5-3.5    Dosing Plan  As of 12/12/2017    TTR:   57.3 % (5 mo)   Full instructions:   12/12: 1.25 mg; 12/13: 1.25 mg; Otherwise 3.75 mg on Mon; 2.5 mg all other days           Per Verbal orders Dr. Katie Mccullough: Decrease dose by half x 2 days, then resume. PLAN: Take 1.25mg tonight and tomorrow, then resume normal dose. NEXT COUMADIN CLINIC APT IS: 12/19/17 @ 11:45am         [facility] INTERNAL [facility]  [address] [facility]  The major complication associated with warfarin is bleeding due to excessive anticoagulation. Excessive bleeding, or hemorrhage, can occur from any area of the body, and patients on warfarin should report any falls or accidents, as well as signs or symptoms of bleeding or unusual bruising. Signs of unusual bleeding include bleeding from the gums, blood in the urine, bloody or dark stool, a nosebleed, or vomiting blood. Because the risk of bleeding increases as the INR rises, the INR is closely monitored and adjustments are made as needed to maintain the INR within the target range. Darrall Telma also cause skin necrosis or gangrene, which can cause dark red or black areas on the skin. This is a rare complication that may occur during the first several days of warfarin therapy. When to seek help  If there are obvious or subtle signs of bleeding, including the following, patients should call their healthcare provider immediately.   · Persistent nausea, stomach upset, or vomiting blood or other material that looks like coffee grounds   · Headaches, dizziness, or weakness   · Nosebleeds   · Dark red or brown urine   · Blood in the bowel movement or dark-colored stool   · Pain, discomfort, or swelling, especially after an injury   · After a serious fall or head injury, even if there are no other symptoms  The patient should also call if any of the following occurs:  · Bleeding from the gums after brushing the teeth   · Swelling or pain at an injection site   · Excessive menstrual bleeding or bleeding between menstrual periods   · Diarrhea, vomiting, or inability to eat for more than 24 hours   · Fever (temperature greater than 100.4º F or 38º C)    It is important to remember that warfarin is taken to reduce the risk of a clotting condition(s), such as a deep vein thrombosis or pulmonary embolism. If one or more of these symptoms develops, the patient should seek immediate medical attention. OTHER RECOMMENDATIONS  Take warfarin on a schedule  Warfarin should be taken exactly as directed. Do not increase, decrease, or change the dose unless told to do so by a healthcare provider. If a dose is missed or forgotten, call the prescribing clinician for advice. Warfarin tablets come in different strengths; each is usually a different color, with the amount of warfarin (in milligrams) clearly printed on the tablet. If the color or dose of the tablet appears different than those taken previously, the patient should immediately notify their pharmacist or healthcare provider. Reduce the risk of bleeding  There is a tendency to bleed more easily than usual while taking warfarin. Some simple changes can decrease this risk:  · Use a soft bristle toothbrush   · Floss with waxed floss rather than unwaxed floss   · Shave with an electric razor rather than a blade   · Take care when using sharp objects, such as knives and scissors   · Avoid activities that have a risk of falling or injury (eg, contact sports)  Prevent falls  Falling may significantly increase the risk of bleeding. Taking measures to prevent falls is recommended, and could include the following:  · Remove loose rugs and electrical cords or any other loose items in the home that could lead to tripping, slipping, and falling.    · Ensure that there is adequate lighting in all areas inside and around the home, including stairwells and entrance ways.   · Avoid walking on ice, wet or polished floors, or other potentially slippery surfaces. · Avoid walking on unfamiliar areas outside. Warfarin and food  Some foods and supplements can interfere with warfarin's effectiveness. After being stabilized on a particular warfarin dose, consult a healthcare provider before making major dietary changes (eg, starting a diet to lose weight, starting a nutritional supplement or vitamin). · Vitamin K  Eating an increased amount of foods rich in vitamin K can lower the prothrombin time and INR, making warfarin less effective, and potentially increasing the risk of blood clots. Patients who take warfarin should aim to eat a relatively similar amount of vitamin K each week. Some foods have a high level of vitamin K, including: kale, broccoli, spinach, miriam or turnip greens, lettuce, [place] sprouts, and cabbage (table 1). It is not necessary to avoid these foods. However, the patient should eat a relatively similar amount on a regular basis rather than eating a large serving occasionally. · Cranberry juice  There have been mixed reports on the effect of cranberry juice in people who use warfarin to prevent blood clots. Some experts have reported that drinking cranberry juice while on warfarin can cause significant over-anticoagulation and bleeding [1]. However, a small study found that drinking one eight ounce serving of cranberry juice per day for seven days had no effect on the INR of seven men taking warfarin for atrial fibrillation [2]. It is possible that larger amounts could have a more significant effect. The best advice is probably to avoid consuming large amounts of cranberry juice, and to speak with a healthcare provider regarding any concerns about a possible interaction. · Alcohol  Chronic abuse of alcohol affects the body's ability to handle warfarin. Patients on warfarin therapy should avoid drinking alcohol on a daily basis.  Alcohol should be limited to no more than one to two servings of alcohol occasionally. In addition, drinking excessive amounts of alcohol can increase the risk of injury, and therefore bleeding. Warfarin and medications  A number of medications, herbs, and vitamins can interact with warfarin (table 2 and table 3). This interaction may affect the action of warfarin or the other medication. If warfarin is affected, the dose may need to be adjusted (up or down) to maintain an optimal coagulation effect. Patients who take warfarin should consult with their clinician before taking any new medication, including over-the-counter (non-prescription) drugs, herbal medicines, vitamins, or any other products. Some of the most common over-the-counter pain relievers, including acetaminophen (Tylenol®), aspirin, and nonsteroidal antiinflammatory drugs (such as ibuprofen [Advil®]) and naproxen (Aleve®), enhance the anticoagulant effects of warfarin. Vitamin E may increase the anticoagulant effects of warfarin. Consult a healthcare provider before adding or changing a dose of vitamin E or any other vitamin. Wear medical identification  People who require long-term warfarin should wear a bracelet, necklace, or similar alert tag at all times. If an accident occurs and the person is too ill to explain their condition, this will help responders provide appropriate care. The alert tag should include a list of major medical conditions and the reason warfarin is needed (eg, atrial fibrillation), as well as the name and phone number of an emergency contact. One device, Medic Alert®, provides a toll-free number that emergency medical workers can call to find out a person's medical history, list of medications, family emergency contact numbers, and healthcare provider names and numbers.     GRAPHICS: Table 1  Foods with moderate to high levels of vitamin K  Food name Serving size Vitamin K (micrograms)   High level vitamin K foods   Kale, frozen (cooked or boiled, drained) 1/2 cup 570   Kale, fresh, (cooked or boiled, drained) 1/2 cup 530   Spinach, frozen (cooked or boiled, drained) 1/2 cup 514   Spinach, raw 1 cup 150   Marielena greens, frozen (cooked, drained) 1/2 cup 530   Turnip greens, frozen (cooked, drained) 1/2 cup 425   [place] sprouts, frozen (cooked, drained) 1/2 cup 110   Moderate level vitamin K foods   Asparagus, frozen (cooked, drained) 1/2 cup  4 macedo 72  48   Asparagus, fresh (cooked, drained) 4 macedo 30   Broccoli, frozen (cooked, drained) 1/2 cup 60   Broccoli, fresh (cooked, drained) 1 spear 52   Broccoli, raw 1/2 cup 40   Lettuce (butterhead, [place], karla) 1/2 head 80   Lettuce (iceberg, crisphead) 1/2 head 65   Lettuce (christina, cos) 1 cup 57   Lettuce (green leaf) 1 cup 97   Okra, fresh (cooked, drained) 1/2 cup 32   Okra, frozen (cooked, drained) 1/2 cup 44   Cabbage (cooked, drained) 1/2 cup 73   Cabbage, raw 1/2 cup 21   Cabbage, yolie (raw) 1/2 cup 24   Cabbage, Chinese (cooked, drained) 1/2 cup 28   Coleslaw (fast food-type) 3/4 cup 56   Sauerkraut, canned 1/2 cup 41   Peas, frozen, with pod (cooked, drained) 1/2 cup 24   Peas, fresh, with pod (cooked, drained) 1/2 cup 20   Peas, green, frozen (cooked, drained) 1/2 cup 18   Celery, raw 1/2 cup 17   Beans, green or yellow, fresh (cooked, drained) 1/2 cup 10   Oil, canola 1 tablespoon 17   Oil, olive 1 tablespoon 8   Oil, other (including peanut, sesame, safflower, corn, sunflower, soybean) 1 tablespoon 3 or less   Green tea, brewed in hot water 3.5 ounces 0.3   Data from: [employer]. USDA Nutrient Database for Standard Reference. Nutrient Data Laboratory Home Page, CreditCardRecommendations.ca.

## 2017-12-13 RX ORDER — POTASSIUM CHLORIDE 750 MG/1
TABLET, FILM COATED, EXTENDED RELEASE ORAL
Qty: 360 TABLET | Refills: 2 | Status: SHIPPED | OUTPATIENT
Start: 2017-12-13 | End: 2017-12-15 | Stop reason: SDUPTHER

## 2017-12-15 ENCOUNTER — TELEPHONE (OUTPATIENT)
Dept: INTERNAL MEDICINE | Age: 62
End: 2017-12-15

## 2017-12-15 RX ORDER — POTASSIUM CHLORIDE 750 MG/1
TABLET, FILM COATED, EXTENDED RELEASE ORAL
Qty: 540 TABLET | Refills: 3 | Status: SHIPPED | OUTPATIENT
Start: 2017-12-15

## 2017-12-20 ENCOUNTER — TELEPHONE (OUTPATIENT)
Dept: INTERNAL MEDICINE | Age: 62
End: 2017-12-20

## 2017-12-20 ENCOUNTER — OFFICE VISIT (OUTPATIENT)
Dept: INTERNAL MEDICINE | Age: 62
End: 2017-12-20
Payer: COMMERCIAL

## 2017-12-20 VITALS
BODY MASS INDEX: 42.56 KG/M2 | HEIGHT: 71 IN | DIASTOLIC BLOOD PRESSURE: 80 MMHG | WEIGHT: 304 LBS | SYSTOLIC BLOOD PRESSURE: 124 MMHG | OXYGEN SATURATION: 95 % | HEART RATE: 72 BPM

## 2017-12-20 DIAGNOSIS — H81.20 VESTIBULAR NEURONITIS, UNSPECIFIED LATERALITY: ICD-10-CM

## 2017-12-20 DIAGNOSIS — I48.19 PERSISTENT ATRIAL FIBRILLATION (HCC): ICD-10-CM

## 2017-12-20 DIAGNOSIS — Z79.01 LONG TERM CURRENT USE OF ANTICOAGULANT THERAPY: ICD-10-CM

## 2017-12-20 DIAGNOSIS — Z95.2 MITRAL VALVE REPLACED: ICD-10-CM

## 2017-12-20 DIAGNOSIS — Z79.01 LONG TERM CURRENT USE OF ANTICOAGULANT THERAPY: Primary | ICD-10-CM

## 2017-12-20 LAB
INR BLD: 2.65 (ref 0.88–1.18)
PROTHROMBIN TIME: 28.6 SEC (ref 12–14.6)

## 2017-12-20 PROCEDURE — 99214 OFFICE O/P EST MOD 30 MIN: CPT | Performed by: INTERNAL MEDICINE

## 2017-12-20 RX ORDER — MECLIZINE HYDROCHLORIDE 25 MG/1
25 TABLET ORAL 3 TIMES DAILY PRN
COMMUNITY
End: 2017-12-20 | Stop reason: SDUPTHER

## 2017-12-20 RX ORDER — MECLIZINE HYDROCHLORIDE 25 MG/1
25 TABLET ORAL 3 TIMES DAILY PRN
Qty: 30 TABLET | Refills: 1 | Status: SHIPPED | OUTPATIENT
Start: 2017-12-20

## 2017-12-20 RX ORDER — PROMETHAZINE HYDROCHLORIDE 25 MG/1
25 TABLET ORAL EVERY 8 HOURS PRN
COMMUNITY

## 2017-12-20 RX ORDER — PROMETHAZINE HYDROCHLORIDE 25 MG/1
25 TABLET ORAL EVERY 8 HOURS PRN
Qty: 20 TABLET | Refills: 1 | Status: SHIPPED | OUTPATIENT
Start: 2017-12-20 | End: 2017-12-27

## 2017-12-20 ASSESSMENT — ENCOUNTER SYMPTOMS
TROUBLE SWALLOWING: 0
ABDOMINAL PAIN: 0
RHINORRHEA: 0
COUGH: 0
SHORTNESS OF BREATH: 0
SORE THROAT: 0
NAUSEA: 0

## 2017-12-20 NOTE — PROGRESS NOTES
Relation Age of Onset    High Blood Pressure Mother     Heart Disease Mother     High Blood Pressure Father     Heart Disease Father     Stroke Father     Heart Disease Paternal Aunt     Arrhythmia Paternal Aunt       Social History     Social History    Marital status:      Spouse name: N/A    Number of children: N/A    Years of education: N/A     Occupational History    Not on file. Social History Main Topics    Smoking status: Never Smoker    Smokeless tobacco: Never Used    Alcohol use No    Drug use: No    Sexual activity: Not on file     Other Topics Concern    Not on file     Social History Narrative    No narrative on file      Allergies   Allergen Reactions    Amoxicillin Hives    Penicillins     Sulfa Antibiotics       Current Outpatient Prescriptions   Medication Sig Dispense Refill    promethazine (PHENERGAN) 25 MG tablet Take 25 mg by mouth every 8 hours as needed for Nausea      meclizine (ANTIVERT) 25 MG tablet Take 25 mg by mouth 3 times daily as needed      promethazine (PHENERGAN) 25 MG tablet Take 1 tablet by mouth every 8 hours as needed for Nausea 20 tablet 1    potassium chloride (KLOR-CON) 10 MEQ extended release tablet Take 3 tablets twice daily.  540 tablet 3    citalopram (CELEXA) 20 MG tablet TAKE ONE TABLET BY MOUTH DAILY 30 tablet 4    propylthiouracil (PTU) 50 MG tablet Take 1 tablet by mouth daily 30 tablet 5    atorvastatin (LIPITOR) 20 MG tablet TAKE ONE TABLET BY MOUTH DAILY 90 tablet 3    warfarin (COUMADIN) 2.5 MG tablet TAKE 2 TABLETS BY MOUTH EVERY MONDAY AND TAKE 1 TABLET BY MOUTH DAILY THE REST OF THE WEEK (Patient taking differently: TAKE 1 1/2 TABLETS BY MOUTH EVERY MONDAY AND TAKE 1 TABLET BY MOUTH DAILY THE REST OF THE WEEK) 90 tablet 4    melatonin 5 MG TABS tablet Take 5 mg by mouth daily      magnesium oxide (MAG-OX) 400 MG tablet Take 400 mg by mouth 2 times daily      spironolactone (ALDACTONE) 25 MG tablet TAKE ONE TABLET BY Cardiovascular: Regular rhythm. No murmur heard. Pulmonary/Chest: No respiratory distress. He exhibits no tenderness. Abdominal: He exhibits no mass. There is no tenderness. Musculoskeletal: He exhibits no edema or deformity. Lymphadenopathy:     He has no cervical adenopathy. Neurological: He is alert. No cranial nerve deficit. Skin: Skin is warm. No erythema. repeat blood pressure 124/80, heart rate regular  Neurologic: No focal motor defects are noted, bilateral hand  is excellent. Nurse Only on 12/12/2017   Component Date Value Ref Range Status    INR 12/12/2017 3.8   Final   Anti-coag visit on 11/21/2017   Component Date Value Ref Range Status    INR 11/21/2017 3.00   Final   Nurse Only on 11/21/2017   Component Date Value Ref Range Status    Protime 11/21/2017 3.0   Final       1. Long term current use of anticoagulant therapy    2. Persistent atrial fibrillation ([address]    3. Mitral valve replaced    4. Vestibular neuronitis, unspecified laterality       ED records reviewed, CT scan was negative; labs unremarkable, glucose 117, potassium 3.9, creatinine 1.03, LFTs stable, bilirubin 2.4, hemoglobin 14.4    ASSESSMENT/PLAN  1. This likely represents a vestibular neuronitis. He will continue conservative therapy since he has improved, this will consist of meclizine 25 3 times a day, and promethazine 25 as needed. If this persists we could make an ENT referral. I find no focal neurologic signs. 2. Anticoagulation: Continue Coumadin, INR pending today  3.  Mitral valve replacement/persistent atrial fibrillation: Cardiac status seems stable, continue follow-up with Dr. Fabio Coffey, electrophysiologist, [facility]    Keep March appointment with Luis Enrique Westfall    Current Coumadin dosage is 2.5 mg 5 days a week, 3.75 on Mondays and Thursdays    Orders Placed This Encounter   Procedures    Protime-INR     Standing Status:   Future     Standing Expiration Date:   12/20/2018     Order Specific

## 2017-12-21 ENCOUNTER — TELEPHONE (OUTPATIENT)
Dept: INTERNAL MEDICINE | Age: 62
End: 2017-12-21

## 2017-12-26 RX ORDER — METOLAZONE 2.5 MG/1
TABLET ORAL
Qty: 30 TABLET | Refills: 2 | Status: SHIPPED | OUTPATIENT
Start: 2017-12-26 | End: 2018-08-03 | Stop reason: SDUPTHER

## 2017-12-28 ENCOUNTER — TELEPHONE (OUTPATIENT)
Dept: INTERNAL MEDICINE | Age: 62
End: 2017-12-28

## 2017-12-29 ENCOUNTER — ANTI-COAG VISIT (OUTPATIENT)
Dept: INTERNAL MEDICINE | Age: 62
End: 2017-12-29

## 2017-12-29 ENCOUNTER — NURSE ONLY (OUTPATIENT)
Dept: INTERNAL MEDICINE | Age: 62
End: 2017-12-29
Payer: COMMERCIAL

## 2017-12-29 DIAGNOSIS — Z79.01 LONG TERM CURRENT USE OF ANTICOAGULANT THERAPY: ICD-10-CM

## 2017-12-29 DIAGNOSIS — Z79.01 LONG TERM CURRENT USE OF ANTICOAGULANT THERAPY: Primary | ICD-10-CM

## 2017-12-29 DIAGNOSIS — I48.19 PERSISTENT ATRIAL FIBRILLATION (HCC): ICD-10-CM

## 2017-12-29 LAB
INTERNATIONAL NORMALIZATION RATIO, POC: 3
PROTHROMBIN TIME, POC: NORMAL

## 2017-12-29 PROCEDURE — 85610 PROTHROMBIN TIME: CPT | Performed by: INTERNAL MEDICINE

## 2017-12-29 NOTE — PROGRESS NOTES
Mr. Dinora Ordonez was here today. INR today: 3.0    INR Goal: 2.5-3.5    Dosing Plan  As of 12/29/2017    TTR:   60.4 % (5.6 mo)   Full instructions:   3.75 mg on Mon, Thu; 2.5 mg all other days                 PLAN: CONTINUE CURRENT DOSE    NEXT COUMADIN CLINIC APT IS: 1/9/2018 AT 11:00        [facility]  [phone]    Michael Halt EFFECTS  The major complication associated with warfarin is bleeding due to excessive anticoagulation. Excessive bleeding, or hemorrhage, can occur from any area of the body, and patients on warfarin should report any falls or accidents, as well as signs or symptoms of bleeding or unusual bruising. Signs of unusual bleeding include bleeding from the gums, blood in the urine, bloody or dark stool, a nosebleed, or vomiting blood. Because the risk of bleeding increases as the INR rises, the INR is closely monitored and adjustments are made as needed to maintain the INR within the target range. Yg Hilts also cause skin necrosis or gangrene, which can cause dark red or black areas on the skin. This is a rare complication that may occur during the first several days of warfarin therapy. When to seek help  If there are obvious or subtle signs of bleeding, including the following, patients should call their healthcare provider immediately.   · Persistent nausea, stomach upset, or vomiting blood or other material that looks like coffee grounds   · Headaches, dizziness, or weakness   · Nosebleeds   · Dark red or brown urine   · Blood in the bowel movement or dark-colored stool   · Pain, discomfort, or swelling, especially after an injury   · After a serious fall or head injury, even if there are no other symptoms  The patient should also call if any of the following occurs:  · Bleeding from the gums after brushing the teeth   · Swelling or pain at an injection site   · Excessive menstrual bleeding or bleeding between menstrual periods   · Diarrhea, vomiting, or inability to eat for more than 24 hours   · Fever (temperature greater than 100.4º F or 38º C)    It is important to remember that warfarin is taken to reduce the risk of a clotting condition(s), such as a deep vein thrombosis or pulmonary embolism. If one or more of these symptoms develops, the patient should seek immediate medical attention. OTHER RECOMMENDATIONS  Take warfarin on a schedule  Warfarin should be taken exactly as directed. Do not increase, decrease, or change the dose unless told to do so by a healthcare provider. If a dose is missed or forgotten, call the prescribing clinician for advice. Warfarin tablets come in different strengths; each is usually a different color, with the amount of warfarin (in milligrams) clearly printed on the tablet. If the color or dose of the tablet appears different than those taken previously, the patient should immediately notify their pharmacist or healthcare provider. Reduce the risk of bleeding  There is a tendency to bleed more easily than usual while taking warfarin. Some simple changes can decrease this risk:  · Use a soft bristle toothbrush   · Floss with waxed floss rather than unwaxed floss   · Shave with an electric razor rather than a blade   · Take care when using sharp objects, such as knives and scissors   · Avoid activities that have a risk of falling or injury (eg, contact sports)  Prevent falls  Falling may significantly increase the risk of bleeding. Taking measures to prevent falls is recommended, and could include the following:  · Remove loose rugs and electrical cords or any other loose items in the home that could lead to tripping, slipping, and falling. · Ensure that there is adequate lighting in all areas inside and around the home, including stairwells and entrance ways. · Avoid walking on ice, wet or polished floors, or other potentially slippery surfaces.    · Avoid walking on unfamiliar areas outside. Warfarin and food  Some foods and supplements can interfere with warfarin's effectiveness. After being stabilized on a particular warfarin dose, consult a healthcare provider before making major dietary changes (eg, starting a diet to lose weight, starting a nutritional supplement or vitamin). · Vitamin K  Eating an increased amount of foods rich in vitamin K can lower the prothrombin time and INR, making warfarin less effective, and potentially increasing the risk of blood clots. Patients who take warfarin should aim to eat a relatively similar amount of vitamin K each week. Some foods have a high level of vitamin K, including: kale, broccoli, spinach, miriam or turnip greens, lettuce, [place] sprouts, and cabbage (table 1). It is not necessary to avoid these foods. However, the patient should eat a relatively similar amount on a regular basis rather than eating a large serving occasionally. · Cranberry juice  There have been mixed reports on the effect of cranberry juice in people who use warfarin to prevent blood clots. Some experts have reported that drinking cranberry juice while on warfarin can cause significant over-anticoagulation and bleeding [1]. However, a small study found that drinking one eight ounce serving of cranberry juice per day for seven days had no effect on the INR of seven men taking warfarin for atrial fibrillation [2]. It is possible that larger amounts could have a more significant effect. The best advice is probably to avoid consuming large amounts of cranberry juice, and to speak with a healthcare provider regarding any concerns about a possible interaction. · Alcohol  Chronic abuse of alcohol affects the body's ability to handle warfarin. Patients on warfarin therapy should avoid drinking alcohol on a daily basis. Alcohol should be limited to no more than one to two servings of alcohol occasionally.  In addition, drinking excessive amounts of alcohol can increase the risk of injury, and therefore bleeding. Warfarin and medications  A number of medications, herbs, and vitamins can interact with warfarin (table 2 and table 3). This interaction may affect the action of warfarin or the other medication. If warfarin is affected, the dose may need to be adjusted (up or down) to maintain an optimal coagulation effect. Patients who take warfarin should consult with their clinician before taking any new medication, including over-the-counter (non-prescription) drugs, herbal medicines, vitamins, or any other products. Some of the most common over-the-counter pain relievers, including acetaminophen (Tylenol®), aspirin, and nonsteroidal antiinflammatory drugs (such as ibuprofen [Advil®]) and naproxen (Aleve®), enhance the anticoagulant effects of warfarin. Vitamin E may increase the anticoagulant effects of warfarin. Consult a healthcare provider before adding or changing a dose of vitamin E or any other vitamin. Wear medical identification  People who require long-term warfarin should wear a bracelet, necklace, or similar alert tag at all times. If an accident occurs and the person is too ill to explain their condition, this will help responders provide appropriate care. The alert tag should include a list of major medical conditions and the reason warfarin is needed (eg, atrial fibrillation), as well as the name and phone number of an emergency contact. One device, Medic Alert®, provides a toll-free number that emergency medical workers can call to find out a person's medical history, list of medications, family emergency contact numbers, and healthcare provider names and numbers.     GRAPHICS: Table 1  Foods with moderate to high levels of vitamin K  Food name Serving size Vitamin K (micrograms)   High level vitamin K foods   Kale, frozen (cooked or boiled, drained) 1/2 cup 570   Kale, fresh, (cooked or boiled, drained) 1/2 cup 530   Spinach, frozen (cooked or boiled,

## 2018-01-09 ENCOUNTER — ANTI-COAG VISIT (OUTPATIENT)
Dept: INTERNAL MEDICINE | Age: 63
End: 2018-01-09

## 2018-01-09 ENCOUNTER — NURSE ONLY (OUTPATIENT)
Dept: INTERNAL MEDICINE | Age: 63
End: 2018-01-09
Payer: OTHER GOVERNMENT

## 2018-01-09 DIAGNOSIS — Z79.01 LONG TERM CURRENT USE OF ANTICOAGULANT THERAPY: Primary | ICD-10-CM

## 2018-01-09 DIAGNOSIS — Z79.01 LONG TERM CURRENT USE OF ANTICOAGULANT THERAPY: ICD-10-CM

## 2018-01-09 LAB
INTERNATIONAL NORMALIZATION RATIO, POC: 4
PROTHROMBIN TIME, POC: NORMAL

## 2018-01-09 PROCEDURE — 85610 PROTHROMBIN TIME: CPT | Performed by: INTERNAL MEDICINE

## 2018-01-09 NOTE — PROGRESS NOTES
alcohol can increase the risk of injury, and therefore bleeding. Warfarin and medications  A number of medications, herbs, and vitamins can interact with warfarin (table 2 and table 3). This interaction may affect the action of warfarin or the other medication. If warfarin is affected, the dose may need to be adjusted (up or down) to maintain an optimal coagulation effect. Patients who take warfarin should consult with their clinician before taking any new medication, including over-the-counter (non-prescription) drugs, herbal medicines, vitamins, or any other products. Some of the most common over-the-counter pain relievers, including acetaminophen (Tylenol®), aspirin, and nonsteroidal antiinflammatory drugs (such as ibuprofen [Advil®]) and naproxen (Aleve®), enhance the anticoagulant effects of warfarin. Vitamin E may increase the anticoagulant effects of warfarin. Consult a healthcare provider before adding or changing a dose of vitamin E or any other vitamin. Wear medical identification  People who require long-term warfarin should wear a bracelet, necklace, or similar alert tag at all times. If an accident occurs and the person is too ill to explain their condition, this will help responders provide appropriate care. The alert tag should include a list of major medical conditions and the reason warfarin is needed (eg, atrial fibrillation), as well as the name and phone number of an emergency contact. One device, Medic Alert®, provides a toll-free number that emergency medical workers can call to find out a person's medical history, list of medications, family emergency contact numbers, and healthcare provider names and numbers.     GRAPHICS: Table 1  Foods with moderate to high levels of vitamin K  Food name Serving size Vitamin K (micrograms)   High level vitamin K foods   Kale, frozen (cooked or boiled, drained) 1/2 cup 570   Kale, fresh, (cooked or boiled, drained) 1/2 cup 530   Spinach, frozen

## 2018-01-15 RX ORDER — DOFETILIDE 0.25 MG/1
CAPSULE ORAL
Qty: 120 CAPSULE | Refills: 10 | Status: SHIPPED | OUTPATIENT
Start: 2018-01-15 | End: 2018-08-20 | Stop reason: SDUPTHER

## 2018-01-23 ENCOUNTER — NURSE ONLY (OUTPATIENT)
Dept: INTERNAL MEDICINE | Age: 63
End: 2018-01-23
Payer: COMMERCIAL

## 2018-01-23 ENCOUNTER — ANTI-COAG VISIT (OUTPATIENT)
Dept: INTERNAL MEDICINE | Age: 63
End: 2018-01-23

## 2018-01-23 DIAGNOSIS — I48.19 PERSISTENT ATRIAL FIBRILLATION (HCC): ICD-10-CM

## 2018-01-23 DIAGNOSIS — Z79.01 LONG TERM CURRENT USE OF ANTICOAGULANT THERAPY: Primary | ICD-10-CM

## 2018-01-23 DIAGNOSIS — Z79.01 LONG TERM CURRENT USE OF ANTICOAGULANT THERAPY: ICD-10-CM

## 2018-01-23 LAB
INTERNATIONAL NORMALIZATION RATIO, POC: 3.4
PROTHROMBIN TIME, POC: NORMAL

## 2018-01-23 PROCEDURE — 85610 PROTHROMBIN TIME: CPT | Performed by: INTERNAL MEDICINE

## 2018-01-23 NOTE — PROGRESS NOTES
vomiting, or inability to eat for more than 24 hours   · Fever (temperature greater than 100.4º F or 38º C)    It is important to remember that warfarin is taken to reduce the risk of a clotting condition(s), such as a deep vein thrombosis or pulmonary embolism. If one or more of these symptoms develops, the patient should seek immediate medical attention. OTHER RECOMMENDATIONS  Take warfarin on a schedule  Warfarin should be taken exactly as directed. Do not increase, decrease, or change the dose unless told to do so by a healthcare provider. If a dose is missed or forgotten, call the prescribing clinician for advice. Warfarin tablets come in different strengths; each is usually a different color, with the amount of warfarin (in milligrams) clearly printed on the tablet. If the color or dose of the tablet appears different than those taken previously, the patient should immediately notify their pharmacist or healthcare provider. Reduce the risk of bleeding  There is a tendency to bleed more easily than usual while taking warfarin. Some simple changes can decrease this risk:  · Use a soft bristle toothbrush   · Floss with waxed floss rather than unwaxed floss   · Shave with an electric razor rather than a blade   · Take care when using sharp objects, such as knives and scissors   · Avoid activities that have a risk of falling or injury (eg, contact sports)  Prevent falls  Falling may significantly increase the risk of bleeding. Taking measures to prevent falls is recommended, and could include the following:  · Remove loose rugs and electrical cords or any other loose items in the home that could lead to tripping, slipping, and falling. · Ensure that there is adequate lighting in all areas inside and around the home, including stairwells and entrance ways. · Avoid walking on ice, wet or polished floors, or other potentially slippery surfaces.    · Avoid walking on unfamiliar areas outside. Warfarin and food  Some foods and supplements can interfere with warfarin's effectiveness. After being stabilized on a particular warfarin dose, consult a healthcare provider before making major dietary changes (eg, starting a diet to lose weight, starting a nutritional supplement or vitamin). · Vitamin K  Eating an increased amount of foods rich in vitamin K can lower the prothrombin time and INR, making warfarin less effective, and potentially increasing the risk of blood clots. Patients who take warfarin should aim to eat a relatively similar amount of vitamin K each week. Some foods have a high level of vitamin K, including: kale, broccoli, spinach, miriam or turnip greens, lettuce, [place] sprouts, and cabbage (table 1). It is not necessary to avoid these foods. However, the patient should eat a relatively similar amount on a regular basis rather than eating a large serving occasionally. · Cranberry juice  There have been mixed reports on the effect of cranberry juice in people who use warfarin to prevent blood clots. Some experts have reported that drinking cranberry juice while on warfarin can cause significant over-anticoagulation and bleeding [1]. However, a small study found that drinking one eight ounce serving of cranberry juice per day for seven days had no effect on the INR of seven men taking warfarin for atrial fibrillation [2]. It is possible that larger amounts could have a more significant effect. The best advice is probably to avoid consuming large amounts of cranberry juice, and to speak with a healthcare provider regarding any concerns about a possible interaction. · Alcohol  Chronic abuse of alcohol affects the body's ability to handle warfarin. Patients on warfarin therapy should avoid drinking alcohol on a daily basis. Alcohol should be limited to no more than one to two servings of alcohol occasionally.  In addition, drinking excessive amounts of alcohol can increase drained) 1/2 cup 514   Spinach, raw 1 cup 150   Mariel[place] greens, frozen (cooked, drained) 1/2 cup 530   Turnip greens, frozen (cooked, drained) 1/2 cup 425   [place] sprouts, frozen (cooked, drained) 1/2 cup 110   Moderate level vitamin K foods   Asparagus, frozen (cooked, drained) 1/2 cup  4 [place] 72  48   Asparagus, fresh (cooked, drained) 4 [place] 30   Broccoli, frozen (cooked, drained) 1/2 cup 60   Broccoli, fresh (cooked, drained) 1 spear 52   Broccoli, raw 1/2 cup 40   Lettuce (butterhead, [place], karla) 1/2 head 80   Lettuce (iceberg, crisphead) 1/2 head 65   Lettuce (christina, cos) 1 cup 57   Lettuce (green leaf) 1 cup 97   Okra, fresh (cooked, drained) 1/2 cup 32   Okra, frozen (cooked, drained) 1/2 cup 44   Cabbage (cooked, drained) 1/2 cup 73   Cabbage, raw 1/2 cup 21   Cabbage, yolie (raw) 1/2 cup 24   Cabbage, Chinese (cooked, drained) 1/2 cup 28   Coleslaw (fast food-type) 3/4 cup 56   Sauerkraut, canned 1/2 cup 41   Peas, frozen, with pod (cooked, drained) 1/2 cup 24   Peas, fresh, with pod (cooked, drained) 1/2 cup 20   Peas, green, frozen (cooked, drained) 1/2 cup 18   Celery, raw 1/2 cup 17   Beans, green or yellow, fresh (cooked, drained) 1/2 cup 10   Oil, canola 1 tablespoon 17   Oil, olive 1 tablespoon 8   Oil, other (including peanut, sesame, safflower, corn, sunflower, soybean) 1 tablespoon 3 or less   Green tea, brewed in hot water 3.5 ounces 0.3   Data from: [employer]. USDA Nutrient Database for Standard Reference. Nutrient Data Laboratory Home Page, [employer].

## 2018-02-05 ENCOUNTER — ANTI-COAG VISIT (OUTPATIENT)
Dept: INTERNAL MEDICINE | Age: 63
End: 2018-02-05

## 2018-02-05 ENCOUNTER — NURSE ONLY (OUTPATIENT)
Dept: INTERNAL MEDICINE | Age: 63
End: 2018-02-05
Payer: COMMERCIAL

## 2018-02-05 DIAGNOSIS — Z79.01 LONG TERM CURRENT USE OF ANTICOAGULANT THERAPY: Primary | ICD-10-CM

## 2018-02-05 DIAGNOSIS — I48.19 PERSISTENT ATRIAL FIBRILLATION (HCC): ICD-10-CM

## 2018-02-05 DIAGNOSIS — Z79.01 LONG TERM CURRENT USE OF ANTICOAGULANT THERAPY: ICD-10-CM

## 2018-02-05 LAB
INTERNATIONAL NORMALIZATION RATIO, POC: 3.3
PROTHROMBIN TIME, POC: NORMAL

## 2018-02-05 PROCEDURE — 85610 PROTHROMBIN TIME: CPT | Performed by: INTERNAL MEDICINE

## 2018-02-05 NOTE — PROGRESS NOTES
Mr. Leticia Valenzuela was here today. INR today: 3.3  INR Goal: 2.5-3.5    Dosing Plan  As of 2/5/2018    TTR:   59.3 % (6.8 mo)   Full instructions:   2/5: 2.5 mg; Otherwise 3.75 mg on Mon; 2.5 mg all other days                 PLAN: CHANGE TO 2.5 MG DAILY    NEXT COUMADIN CLINIC APT IS: 2/14/18 AT 11:30 AM        [facility] INTERNAL MEDICINE COUMADIN CLINIC  [phone]    [facility] SIDE EFFECTS  The major complication associated with warfarin is bleeding due to excessive anticoagulation. Excessive bleeding, or hemorrhage, can occur from any area of the body, and patients on warfarin should report any falls or accidents, as well as signs or symptoms of bleeding or unusual bruising. Signs of unusual bleeding include bleeding from the gums, blood in the urine, bloody or dark stool, a nosebleed, or vomiting blood. Because the risk of bleeding increases as the INR rises, the INR is closely monitored and adjustments are made as needed to maintain the INR within the target range. Kel Kohli also cause skin necrosis or gangrene, which can cause dark red or black areas on the skin. This is a rare complication that may occur during the first several days of warfarin therapy. When to seek help  If there are obvious or subtle signs of bleeding, including the following, patients should call their healthcare provider immediately.   · Persistent nausea, stomach upset, or vomiting blood or other material that looks like coffee grounds   · Headaches, dizziness, or weakness   · Nosebleeds   · Dark red or brown urine   · Blood in the bowel movement or dark-colored stool   · Pain, discomfort, or swelling, especially after an injury   · After a serious fall or head injury, even if there are no other symptoms  The patient should also call if any of the following occurs:  · Bleeding from the gums after brushing the teeth   · Swelling or pain at an injection site   · Excessive menstrual bleeding or bleeding between menstrual

## 2018-02-15 ENCOUNTER — TELEPHONE (OUTPATIENT)
Dept: INTERNAL MEDICINE | Age: 63
End: 2018-02-15

## 2018-02-15 ENCOUNTER — NURSE ONLY (OUTPATIENT)
Dept: INTERNAL MEDICINE | Age: 63
End: 2018-02-15
Payer: COMMERCIAL

## 2018-02-15 ENCOUNTER — ANTI-COAG VISIT (OUTPATIENT)
Dept: INTERNAL MEDICINE | Age: 63
End: 2018-02-15

## 2018-02-15 DIAGNOSIS — I48.19 PERSISTENT ATRIAL FIBRILLATION (HCC): ICD-10-CM

## 2018-02-15 DIAGNOSIS — Z79.01 LONG TERM CURRENT USE OF ANTICOAGULANT THERAPY: Primary | ICD-10-CM

## 2018-02-15 DIAGNOSIS — Z79.01 LONG TERM CURRENT USE OF ANTICOAGULANT THERAPY: ICD-10-CM

## 2018-02-15 LAB
INTERNATIONAL NORMALIZATION RATIO, POC: 3.4
PROTHROMBIN TIME, POC: NORMAL

## 2018-02-15 PROCEDURE — 85610 PROTHROMBIN TIME: CPT | Performed by: INTERNAL MEDICINE

## 2018-02-15 NOTE — PROGRESS NOTES
Mr. Jake Hooker was here today. INR today: 3.4    INR Goal: 2.5-3.5    Dosing Plan  As of 2/15/2018    TTR:   61.3 % (7.2 mo)   Full instructions:   2.5 mg every day             Patient is currently on an antibiotic and tamiflu for flu and pneumonia. Patient will monitor for excessive bleeding. PLAN: CONTINUE CURRENT DOSE    NEXT COUMADIN CLINIC APT IS: 3/1/18 at 12:00 pm        [facility] INTERNAL MEDICINE COUMADIN CLINIC  [phone]    [facility] SIDE EFFECTS  The major complication associated with warfarin is bleeding due to excessive anticoagulation. Excessive bleeding, or hemorrhage, can occur from any area of the body, and patients on warfarin should report any falls or accidents, as well as signs or symptoms of bleeding or unusual bruising. Signs of unusual bleeding include bleeding from the gums, blood in the urine, bloody or dark stool, a nosebleed, or vomiting blood. Because the risk of bleeding increases as the INR rises, the INR is closely monitored and adjustments are made as needed to maintain the INR within the target range. Francis Loach also cause skin necrosis or gangrene, which can cause dark red or black areas on the skin. This is a rare complication that may occur during the first several days of warfarin therapy. When to seek help  If there are obvious or subtle signs of bleeding, including the following, patients should call their healthcare provider immediately.   · Persistent nausea, stomach upset, or vomiting blood or other material that looks like coffee grounds   · Headaches, dizziness, or weakness   · Nosebleeds   · Dark red or brown urine   · Blood in the bowel movement or dark-colored stool   · Pain, discomfort, or swelling, especially after an injury   · After a serious fall or head injury, even if there are no other symptoms  The patient should also call if any of the following occurs:  · Bleeding from the gums after brushing the teeth   · Swelling or pain at an fresh, (cooked or boiled, drained) 1/2 cup 530   Spinach, frozen (cooked or boiled, drained) 1/2 cup 514   Spinach, raw 1 cup 150   Marielena greens, frozen (cooked, drained) 1/2 cup 530   Turnip greens, frozen (cooked, drained) 1/2 cup 425   [place] sprouts, frozen (cooked, drained) 1/2 cup 110   Moderate level vitamin K foods   Asparagus, frozen (cooked, drained) 1/2 cup  4 macedo 72  48   Asparagus, fresh (cooked, drained) 4 macedo 30   Broccoli, frozen (cooked, drained) 1/2 cup 60   Broccoli, fresh (cooked, drained) 1 spear 52   Broccoli, raw 1/2 cup 40   Lettuce (butterhead, [place], karla) 1/2 head 80   Lettuce (iceberg, crisphead) 1/2 head 65   Lettuce (christina, cos) 1 cup 57   Lettuce (green leaf) 1 cup 97   Okra, fresh (cooked, drained) 1/2 cup 32   Okra, frozen (cooked, drained) 1/2 cup 44   Cabbage (cooked, drained) 1/2 cup 73   Cabbage, raw 1/2 cup 21   Cabbage, yolie (raw) 1/2 cup 24   Cabbage, Chinese (cooked, drained) 1/2 cup 28   Coleslaw (fast food-type) 3/4 cup 56   Sauerkraut, canned 1/2 cup 41   Peas, frozen, with pod (cooked, drained) 1/2 cup 24   Peas, fresh, with pod (cooked, drained) 1/2 cup 20   Peas, green, frozen (cooked, drained) 1/2 cup 18   Celery, raw 1/2 cup 17   Beans, green or yellow, fresh (cooked, drained) 1/2 cup 10   Oil, canola 1 tablespoon 17   Oil, olive 1 tablespoon 8   Oil, other (including peanut, sesame, safflower, corn, sunflower, soybean) 1 tablespoon 3 or less   Green tea, brewed in hot water 3.5 ounces 0.3   Data from: [employer]. USDA Nutrient Database for Standard Reference. Nutrient Data Laboratory Home Page, CreditCardRecommendations.ca.

## 2018-03-01 ENCOUNTER — ANTI-COAG VISIT (OUTPATIENT)
Dept: INTERNAL MEDICINE | Age: 63
End: 2018-03-01

## 2018-03-01 ENCOUNTER — NURSE ONLY (OUTPATIENT)
Dept: INTERNAL MEDICINE | Age: 63
End: 2018-03-01
Payer: COMMERCIAL

## 2018-03-01 DIAGNOSIS — Z79.01 LONG TERM CURRENT USE OF ANTICOAGULANT THERAPY: ICD-10-CM

## 2018-03-01 DIAGNOSIS — Z79.01 LONG TERM CURRENT USE OF ANTICOAGULANT THERAPY: Primary | ICD-10-CM

## 2018-03-01 LAB
INTERNATIONAL NORMALIZATION RATIO, POC: 3.9
PROTHROMBIN TIME, POC: NORMAL

## 2018-03-01 PROCEDURE — 85610 PROTHROMBIN TIME: CPT | Performed by: INTERNAL MEDICINE

## 2018-03-01 NOTE — PROGRESS NOTES
outside. Warfarin and food  Some foods and supplements can interfere with warfarin's effectiveness. After being stabilized on a particular warfarin dose, consult a healthcare provider before making major dietary changes (eg, starting a diet to lose weight, starting a nutritional supplement or vitamin). · Vitamin K  Eating an increased amount of foods rich in vitamin K can lower the prothrombin time and INR, making warfarin less effective, and potentially increasing the risk of blood clots. Patients who take warfarin should aim to eat a relatively similar amount of vitamin K each week. Some foods have a high level of vitamin K, including: kale, broccoli, spinach, miriam or turnip greens, lettuce, [place] sprouts, and cabbage (table 1). It is not necessary to avoid these foods. However, the patient should eat a relatively similar amount on a regular basis rather than eating a large serving occasionally. · Cranberry juice  There have been mixed reports on the effect of cranberry juice in people who use warfarin to prevent blood clots. Some experts have reported that drinking cranberry juice while on warfarin can cause significant over-anticoagulation and bleeding [1]. However, a small study found that drinking one eight ounce serving of cranberry juice per day for seven days had no effect on the INR of seven men taking warfarin for atrial fibrillation [2]. It is possible that larger amounts could have a more significant effect. The best advice is probably to avoid consuming large amounts of cranberry juice, and to speak with a healthcare provider regarding any concerns about a possible interaction. · Alcohol  Chronic abuse of alcohol affects the body's ability to handle warfarin. Patients on warfarin therapy should avoid drinking alcohol on a daily basis. Alcohol should be limited to no more than one to two servings of alcohol occasionally.  In addition, drinking excessive amounts of alcohol can increase the risk of injury, and therefore bleeding. Warfarin and medications  A number of medications, herbs, and vitamins can interact with warfarin (table 2 and table 3). This interaction may affect the action of warfarin or the other medication. If warfarin is affected, the dose may need to be adjusted (up or down) to maintain an optimal coagulation effect. Patients who take warfarin should consult with their clinician before taking any new medication, including over-the-counter (non-prescription) drugs, herbal medicines, vitamins, or any other products. Some of the most common over-the-counter pain relievers, including acetaminophen (Tylenol®), aspirin, and nonsteroidal antiinflammatory drugs (such as ibuprofen [Advil®]) and naproxen (Aleve®), enhance the anticoagulant effects of warfarin. Vitamin E may increase the anticoagulant effects of warfarin. Consult a healthcare provider before adding or changing a dose of vitamin E or any other vitamin. Wear medical identification  People who require long-term warfarin should wear a bracelet, necklace, or similar alert tag at all times. If an accident occurs and the person is too ill to explain their condition, this will help responders provide appropriate care. The alert tag should include a list of major medical conditions and the reason warfarin is needed (eg, atrial fibrillation), as well as the name and phone number of an emergency contact. One device, Medic Alert®, provides a toll-free number that emergency medical workers can call to find out a person's medical history, list of medications, family emergency contact numbers, and healthcare provider names and numbers.     GRAPHICS: Table 1  Foods with moderate to high levels of vitamin K  Food name Serving size Vitamin K (micrograms)   High level vitamin K foods   Kale, frozen (cooked or boiled, drained) 1/2 cup 570   Kale, fresh, (cooked or boiled, drained) 1/2 cup 530   Spinach, frozen (cooked or boiled,

## 2018-03-06 ENCOUNTER — OFFICE VISIT (OUTPATIENT)
Dept: INTERNAL MEDICINE | Age: 63
End: 2018-03-06
Payer: COMMERCIAL

## 2018-03-06 VITALS
HEIGHT: 70 IN | DIASTOLIC BLOOD PRESSURE: 80 MMHG | OXYGEN SATURATION: 97 % | HEART RATE: 85 BPM | SYSTOLIC BLOOD PRESSURE: 120 MMHG | TEMPERATURE: 97.2 F | BODY MASS INDEX: 43.09 KG/M2 | WEIGHT: 301 LBS

## 2018-03-06 DIAGNOSIS — G89.29 CHRONIC BILATERAL LOW BACK PAIN WITH BILATERAL SCIATICA: ICD-10-CM

## 2018-03-06 DIAGNOSIS — Z95.810 AICD (AUTOMATIC CARDIOVERTER/DEFIBRILLATOR) PRESENT: ICD-10-CM

## 2018-03-06 DIAGNOSIS — E05.90 HYPERTHYROIDISM: ICD-10-CM

## 2018-03-06 DIAGNOSIS — M54.41 CHRONIC BILATERAL LOW BACK PAIN WITH BILATERAL SCIATICA: ICD-10-CM

## 2018-03-06 DIAGNOSIS — Z23 NEED FOR PROPHYLACTIC VACCINATION AGAINST DIPHTHERIA-TETANUS-PERTUSSIS (DTP): ICD-10-CM

## 2018-03-06 DIAGNOSIS — M54.42 CHRONIC BILATERAL LOW BACK PAIN WITH BILATERAL SCIATICA: ICD-10-CM

## 2018-03-06 DIAGNOSIS — G47.33 OBSTRUCTIVE SLEEP APNEA SYNDROME: ICD-10-CM

## 2018-03-06 DIAGNOSIS — E78.2 MIXED HYPERLIPIDEMIA: ICD-10-CM

## 2018-03-06 DIAGNOSIS — I10 ESSENTIAL HYPERTENSION: ICD-10-CM

## 2018-03-06 DIAGNOSIS — Z23 NEED FOR PROPHYLACTIC VACCINATION AGAINST STREPTOCOCCUS PNEUMONIAE (PNEUMOCOCCUS): ICD-10-CM

## 2018-03-06 DIAGNOSIS — Z00.00 ROUTINE GENERAL MEDICAL EXAMINATION AT A HEALTH CARE FACILITY: Primary | ICD-10-CM

## 2018-03-06 DIAGNOSIS — I48.19 PERSISTENT ATRIAL FIBRILLATION (HCC): ICD-10-CM

## 2018-03-06 DIAGNOSIS — Z79.01 LONG TERM CURRENT USE OF ANTICOAGULANT THERAPY: ICD-10-CM

## 2018-03-06 PROCEDURE — 90715 TDAP VACCINE 7 YRS/> IM: CPT | Performed by: PHYSICIAN ASSISTANT

## 2018-03-06 PROCEDURE — 99214 OFFICE O/P EST MOD 30 MIN: CPT | Performed by: PHYSICIAN ASSISTANT

## 2018-03-06 PROCEDURE — 90471 IMMUNIZATION ADMIN: CPT | Performed by: PHYSICIAN ASSISTANT

## 2018-03-06 PROCEDURE — 90732 PPSV23 VACC 2 YRS+ SUBQ/IM: CPT | Performed by: PHYSICIAN ASSISTANT

## 2018-03-06 PROCEDURE — 90472 IMMUNIZATION ADMIN EACH ADD: CPT | Performed by: PHYSICIAN ASSISTANT

## 2018-03-06 ASSESSMENT — ENCOUNTER SYMPTOMS
EYE PAIN: 0
SORE THROAT: 0
BACK PAIN: 1
CONSTIPATION: 0
DIARRHEA: 0
NAUSEA: 0
SINUS PRESSURE: 1
ABDOMINAL PAIN: 0
SHORTNESS OF BREATH: 0
RHINORRHEA: 0
CHEST TIGHTNESS: 0
EYE REDNESS: 0
WHEEZING: 0
PHOTOPHOBIA: 0
VOMITING: 0
COUGH: 0
COLOR CHANGE: 0

## 2018-03-06 NOTE — PROGRESS NOTES
[facility]  [address]  [address] [place] [place] [address]  Dept: [phone]  Dept Fax: [phone]: [phone]      [facility] INTERNAL MEDICINE  OFFICE NOTE      Chief Complaint   Patient presents with    Other     4 month f/u , still has sinus congestion, had flu and pneumonia 5-6 weeks ago        Bj Li is a 65y.o. year old male who is seen for evaluation of Four-month follow-up for chronic conditions hypothyroidism, hypertension, hyperlipidemia, A. fib, SUZETTE, back and bilateral leg pain, long-term anticoagulation. Patient states did not do labs today would like to do in 3 months. Patient states he continues to take PTU 50 mg daily for hypothyroidism. Patient denies any side effects from the medication. We will recheck TSH and free t4 in 3 months. Patient blood pressure seems fairly well controlled on current medication regimen for hypertension. Patient states compliant with taking medication and denies any side effects from the medication. Patient denies any chest pain, shortness of breath. Patient does have A. fib so he does have palpitations times time. Patient has cholesterol checked almost 9 months ago and seems like it's fairly well controlled his last LDL was 56. Patient states compliant with taking Lipitor 20 mg at bedtime. Patient denies any side effects from lipid lowering medication. Patient's A. fib seems fairly well controlled on current medication regimen continue as directed. Patient denies any chest pain shortness breath. Patient states has periodic palpitations. Patient uses CPAP for obstructive sleep apnea patient states he is compliant with using the CPAP. Patient states it helps. Patient states last couple months he's been noticing a little increase in lower back pain which radiates down the lateral aspects of his legs and stops at his knees.   Patient states would like to try some physical cardioversion , Dr Katelyn Sands, [address] [place]     Cardiomyopathy, hypertrophic [facility])     Chronic liver disease     Depression     Fatty liver     GERD (gastroesophageal reflux disease)     Glaucoma     Hyperlipidemia     Hypertension     Hyperthyroidism     amiodarone induced     Mitral valve replaced     2006, mechanical MVR, Dr Danyelle Gates Sleep apnea     dx 2010, P     Transient ischemic attack     Vestibular neuronitis 12/20/2017       Prior to Visit Medications    Medication Sig Taking? Authorizing Provider   spironolactone (ALDACTONE) 25 MG tablet TAKE ONE TABLET BY MOUTH DAILY Yes Margaret Gross MD   metolazone (ZAROXOLYN) 2.5 MG tablet TAKE ONE TABLET BY MOUTH MONDAY, WEDNESDAY, AND FRIDAY FOR EXCESS FLUID Yes Margaret Gross MD   promethazine (PHENERGAN) 25 MG tablet Take 25 mg by mouth every 8 hours as needed for Nausea Yes Historical Provider, MD   meclizine (ANTIVERT) 25 MG tablet Take 1 tablet by mouth 3 times daily as needed for Dizziness Yes Margaret Gross MD   potassium chloride (KLOR-CON) 10 MEQ extended release tablet Take 3 tablets twice daily.  Yes Margaret Gross MD   citalopram (CELEXA) 20 MG tablet TAKE ONE TABLET BY MOUTH DAILY Yes Margaret Gross MD   propylthiouracil (PTU) 50 MG tablet Take 1 tablet by mouth daily Yes Margaret Gross MD   atorvastatin (LIPITOR) 20 MG tablet TAKE ONE TABLET BY MOUTH DAILY Yes Margaret Gross MD   warfarin (COUMADIN) 2.5 MG tablet TAKE 2 TABLETS BY MOUTH EVERY MONDAY AND TAKE 1 TABLET BY MOUTH DAILY THE REST OF THE WEEK  Patient taking differently: 1 tablet daily Yes Margaret Gross MD   melatonin 5 MG TABS tablet Take 5 mg by mouth daily Yes Historical Provider, MD   magnesium oxide (MAG-OX) 400 MG tablet Take 400 mg by mouth 2 times daily Yes Historical Provider, MD   mometasone (ASMANEX HFA) 200 MCG/ACT AERO inhaler Inhale 1 puff into the lungs 2 times daily Yes Historical Provider, MD   desonide (DESOWEN) 0.05 % cream Apply topically 2 times daily Apply topically 2 times daily. Yes Historical Provider, MD   furosemide (LASIX) 40 MG tablet Take 40 mg by mouth 2 times daily Yes Historical Provider, MD   latanoprost (XALATAN) 0.005 % ophthalmic solution 1 drop nightly Yes Historical Provider, MD   metoprolol succinate (TOPROL XL) 50 MG extended release tablet Take 100 mg by mouth 2 times daily Take one and one-half tablets twice daily Yes Historical Provider, MD   dofetilide (TIKOSYN) 250 MCG capsule Take 500 mcg by mouth 2 times daily Yes Historical Provider, MD   levalbuterol (XOPENEX) 1.25 MG/3ML nebulizer solution Take 1 ampule by nebulization every 4 hours as needed for Wheezing Yes Historical Provider, MD   ipratropium (ATROVENT) 0.02 % nebulizer solution Take 0.5 mg by nebulization 4 times daily Yes Historical Provider, MD       Past Surgical History:   Procedure Laterality Date    APPENDECTOMY      CARDIAC CATHETERIZATION      CHOLECYSTECTOMY      PACEMAKER PLACEMENT      VASECTOMY         Family History   Problem Relation Age of Onset    High Blood Pressure Mother     Heart Disease Mother     High Blood Pressure Father     Heart Disease Father     Stroke Father     Heart Disease Paternal Aunt     Arrhythmia Paternal Aunt        Allergies   Allergen Reactions    Amoxicillin Hives    Penicillins     Sulfa Antibiotics        Social History     Social History    Marital status:      Spouse name: N/A    Number of children: N/A    Years of education: N/A     Occupational History    Not on file. Social History Main Topics    Smoking status: Never Smoker    Smokeless tobacco: Never Used    Alcohol use No    Drug use: No    Sexual activity: Not on file     Other Topics Concern    Not on file     Social History Narrative    No narrative on file       Review of Systems  Review of Systems   Constitutional: Negative for activity change, appetite change, fatigue and unexpected weight change.    HENT: Positive for congestion and sinus pressure. Negative for ear pain, postnasal drip, rhinorrhea, sneezing and sore throat. Eyes: Negative for photophobia, pain and redness. Respiratory: Negative for cough, chest tightness, shortness of breath and wheezing. Cardiovascular: Negative for chest pain, palpitations and leg swelling. Gastrointestinal: Negative for abdominal pain, constipation, diarrhea, nausea and vomiting. Genitourinary: Negative for dysuria, frequency, hematuria and urgency. Musculoskeletal: Positive for arthralgias, back pain and myalgias (lateral thighs). Negative for gait problem and joint swelling. Skin: Negative for color change, pallor and wound. Neurological: Negative for dizziness, speech difficulty, weakness, light-headedness, numbness and headaches. Hematological: Negative for adenopathy. Does not bruise/bleed easily. Psychiatric/Behavioral: Negative for confusion. The patient is not nervous/anxious. All other systems reviewed and are negative. Current Outpatient Prescriptions   Medication Sig Dispense Refill    spironolactone (ALDACTONE) 25 MG tablet TAKE ONE TABLET BY MOUTH DAILY 30 tablet 5    metolazone (ZAROXOLYN) 2.5 MG tablet TAKE ONE TABLET BY MOUTH MONDAY, WEDNESDAY, AND FRIDAY FOR EXCESS FLUID 30 tablet 2    promethazine (PHENERGAN) 25 MG tablet Take 25 mg by mouth every 8 hours as needed for Nausea      meclizine (ANTIVERT) 25 MG tablet Take 1 tablet by mouth 3 times daily as needed for Dizziness 30 tablet 1    potassium chloride (KLOR-CON) 10 MEQ extended release tablet Take 3 tablets twice daily.  540 tablet 3    citalopram (CELEXA) 20 MG tablet TAKE ONE TABLET BY MOUTH DAILY 30 tablet 4    propylthiouracil (PTU) 50 MG tablet Take 1 tablet by mouth daily 30 tablet 5    atorvastatin (LIPITOR) 20 MG tablet TAKE ONE TABLET BY MOUTH DAILY 90 tablet 3    warfarin (COUMADIN) 2.5 MG tablet TAKE 2 TABLETS BY MOUTH EVERY MONDAY AND TAKE 1 TABLET BY MOUTH DAILY THE wheezes. He has no rales. He exhibits no tenderness. Abdominal: Soft. There is no tenderness. There is no rebound and no guarding. Musculoskeletal: Normal range of motion. He exhibits no deformity. Lumbar back: He exhibits tenderness and pain. He exhibits normal range of motion and no spasm. Right upper leg: He exhibits tenderness. Left upper leg: He exhibits tenderness. Legs:  Lymphadenopathy:     He has no cervical adenopathy. Neurological: He is alert. Skin: Skin is warm, dry and intact. No lesion and no rash noted. No erythema. Psychiatric: He has a normal mood and affect. His mood appears not anxious. He does not exhibit a depressed mood. Nursing note and vitals reviewed. ASSESSMENT      ICD-10-CM ICD-9-CM    1. Routine general medical examination at a health care facility Z00.00 V70.0    2. Hyperthyroidism E05.90 242.90 TSH without Reflex      T4, Free   3. Essential hypertension I10 401.9 CBC Auto Differential   4. Mixed hyperlipidemia E78.2 272.2 Comprehensive Metabolic Panel      Lipid Panel   5. AICD (automatic cardioverter/defibrillator) present Z95.810 V45.02    6. Persistent atrial fibrillation (HCC) I48.1 427.31    7. Obstructive sleep apnea syndrome G47.33 327.23    8. Long term current use of anticoagulant therapy Z79.01 V58.61    9. Chronic bilateral low back pain with bilateral sciatica M54.42 724.2 External Referral To Physical Therapy    M54.41 724.3     G89.29 338.29    10. Need for prophylactic vaccination against Streptococcus pneumoniae (pneumococcus) Z23 V03.82 Pneumococcal polysaccharide vaccine 23-valent PPSV23   11. Need for prophylactic vaccination against diphtheria-tetanus-pertussis (DTP) Z23 V06.1 Tdap (age 10y-63y) IM (Adacel)       PLAN  Patient here for 4 month follow-up. Discussed with patient that it will be a very important that he get his labs done at the next visit.   Patient doesn't seem to be having side effects from

## 2018-03-15 ENCOUNTER — NURSE ONLY (OUTPATIENT)
Dept: INTERNAL MEDICINE | Age: 63
End: 2018-03-15
Payer: COMMERCIAL

## 2018-03-15 ENCOUNTER — ANTI-COAG VISIT (OUTPATIENT)
Dept: INTERNAL MEDICINE | Age: 63
End: 2018-03-15

## 2018-03-15 DIAGNOSIS — Z79.01 LONG TERM CURRENT USE OF ANTICOAGULANT THERAPY: ICD-10-CM

## 2018-03-15 DIAGNOSIS — Z79.01 LONG TERM CURRENT USE OF ANTICOAGULANT THERAPY: Primary | ICD-10-CM

## 2018-03-15 LAB
INTERNATIONAL NORMALIZATION RATIO, POC: 2.6
PROTHROMBIN TIME, POC: NORMAL

## 2018-03-15 PROCEDURE — 85610 PROTHROMBIN TIME: CPT | Performed by: INTERNAL MEDICINE

## 2018-03-29 ENCOUNTER — NURSE ONLY (OUTPATIENT)
Dept: INTERNAL MEDICINE | Age: 63
End: 2018-03-29
Payer: COMMERCIAL

## 2018-03-29 ENCOUNTER — ANTI-COAG VISIT (OUTPATIENT)
Dept: INTERNAL MEDICINE | Age: 63
End: 2018-03-29

## 2018-03-29 DIAGNOSIS — Z79.01 LONG TERM CURRENT USE OF ANTICOAGULANT THERAPY: Primary | ICD-10-CM

## 2018-03-29 DIAGNOSIS — Z79.01 LONG TERM CURRENT USE OF ANTICOAGULANT THERAPY: ICD-10-CM

## 2018-03-29 LAB
INTERNATIONAL NORMALIZATION RATIO, POC: 2.6
PROTHROMBIN TIME, POC: NORMAL

## 2018-03-29 PROCEDURE — 85610 PROTHROMBIN TIME: CPT | Performed by: INTERNAL MEDICINE

## 2018-03-29 NOTE — PROGRESS NOTES
Mr. Jake Hooker was here today. INR today: 2.6      INR Goal: 2.5-3.5    Dosing Plan  As of 3/29/2018    TTR:   61.6 % (8.6 mo)   Full instructions:   2.5 mg every day                 PLAN: Continue same dose. NEXT COUMADIN CLINIC APT IS: 04/10/18 @ 11:30 am           [facility] INTERNAL MEDICINE COUMADIN CLINIC  [phone]    [facility] SIDE EFFECTS  The major complication associated with warfarin is bleeding due to excessive anticoagulation. Excessive bleeding, or hemorrhage, can occur from any area of the body, and patients on warfarin should report any falls or accidents, as well as signs or symptoms of bleeding or unusual bruising. Signs of unusual bleeding include bleeding from the gums, blood in the urine, bloody or dark stool, a nosebleed, or vomiting blood. Because the risk of bleeding increases as the INR rises, the INR is closely monitored and adjustments are made as needed to maintain the INR within the target range. Francis Loach also cause skin necrosis or gangrene, which can cause dark red or black areas on the skin. This is a rare complication that may occur during the first several days of warfarin therapy. When to seek help  If there are obvious or subtle signs of bleeding, including the following, patients should call their healthcare provider immediately.   · Persistent nausea, stomach upset, or vomiting blood or other material that looks like coffee grounds   · Headaches, dizziness, or weakness   · Nosebleeds   · Dark red or brown urine   · Blood in the bowel movement or dark-colored stool   · Pain, discomfort, or swelling, especially after an injury   · After a serious fall or head injury, even if there are no other symptoms  The patient should also call if any of the following occurs:  · Bleeding from the gums after brushing the teeth   · Swelling or pain at an injection site   · Excessive menstrual bleeding or bleeding between menstrual periods   · Diarrhea, vomiting, or inability and supplements can interfere with warfarin's effectiveness. After being stabilized on a particular warfarin dose, consult a healthcare provider before making major dietary changes (eg, starting a diet to lose weight, starting a nutritional supplement or vitamin). · Vitamin K  Eating an increased amount of foods rich in vitamin K can lower the prothrombin time and INR, making warfarin less effective, and potentially increasing the risk of blood clots. Patients who take warfarin should aim to eat a relatively similar amount of vitamin K each week. Some foods have a high level of vitamin K, including: kale, broccoli, spinach, miriam or turnip greens, lettuce, [place] sprouts, and cabbage (table 1). It is not necessary to avoid these foods. However, the patient should eat a relatively similar amount on a regular basis rather than eating a large serving occasionally. · Cranberry juice  There have been mixed reports on the effect of cranberry juice in people who use warfarin to prevent blood clots. Some experts have reported that drinking cranberry juice while on warfarin can cause significant over-anticoagulation and bleeding [1]. However, a small study found that drinking one eight ounce serving of cranberry juice per day for seven days had no effect on the INR of seven men taking warfarin for atrial fibrillation [2]. It is possible that larger amounts could have a more significant effect. The best advice is probably to avoid consuming large amounts of cranberry juice, and to speak with a healthcare provider regarding any concerns about a possible interaction. · Alcohol  Chronic abuse of alcohol affects the body's ability to handle warfarin. Patients on warfarin therapy should avoid drinking alcohol on a daily basis. Alcohol should be limited to no more than one to two servings of alcohol occasionally.  In addition, drinking excessive amounts of alcohol can increase the risk of injury, and therefore cup 150   Mariel[place] greens, frozen (cooked, drained) 1/2 cup 530   Turnip greens, frozen (cooked, drained) 1/2 cup 425   [place] sprouts, frozen (cooked, drained) 1/2 cup 110   Moderate level vitamin K foods   Asparagus, frozen (cooked, drained) 1/2 cup  4 macedo 72  48   Asparagus, fresh (cooked, drained) 4 m[place]o 30   Broccoli, frozen (cooked, drained) 1/2 cup 60   Broccoli, fresh (cooked, drained) 1 spear 52   Broccoli, raw 1/2 cup 40   Lettuce (butterhead, [place], karla) 1/2 head 80   Lettuce (iceberg, crisphead) 1/2 head 65   Lettuce (christina, cos) 1 cup 57   Lettuce (green leaf) 1 cup 97   Okra, fresh (cooked, drained) 1/2 cup 32   Okra, frozen (cooked, drained) 1/2 cup 44   Cabbage (cooked, drained) 1/2 cup 73   Cabbage, raw 1/2 cup 21   Cabbage, yolie (raw) 1/2 cup 24   Cabbage, Chinese (cooked, drained) 1/2 cup 28   Coleslaw (fast food-type) 3/4 cup 56   Sauerkraut, canned 1/2 cup 41   Peas, frozen, with pod (cooked, drained) 1/2 cup 24   Peas, fresh, with pod (cooked, drained) 1/2 cup 20   Peas, green, frozen (cooked, drained) 1/2 cup 18   Celery, raw 1/2 cup 17   Beans, green or yellow, fresh (cooked, drained) 1/2 cup 10   Oil, canola 1 tablespoon 17   Oil, olive 1 tablespoon 8   Oil, other (including peanut, sesame, safflower, corn, sunflower, soybean) 1 tablespoon 3 or less   Green tea, brewed in hot water 3.5 ounces 0.3   Data from: [employer]. USDA Nutrient Database for Standard Reference. Nutrient Data Laboratory Home Page, [employer]CardRecommendations.ca.

## 2018-04-10 ENCOUNTER — ANTI-COAG VISIT (OUTPATIENT)
Dept: INTERNAL MEDICINE | Age: 63
End: 2018-04-10

## 2018-04-10 ENCOUNTER — NURSE ONLY (OUTPATIENT)
Dept: INTERNAL MEDICINE | Age: 63
End: 2018-04-10
Payer: COMMERCIAL

## 2018-04-10 DIAGNOSIS — I48.19 PERSISTENT ATRIAL FIBRILLATION (HCC): ICD-10-CM

## 2018-04-10 DIAGNOSIS — Z79.01 LONG TERM CURRENT USE OF ANTICOAGULANT THERAPY: ICD-10-CM

## 2018-04-10 DIAGNOSIS — Z79.01 LONG TERM CURRENT USE OF ANTICOAGULANT THERAPY: Primary | ICD-10-CM

## 2018-04-10 LAB
INTERNATIONAL NORMALIZATION RATIO, POC: 2.8
PROTHROMBIN TIME, POC: NORMAL

## 2018-04-10 PROCEDURE — 85610 PROTHROMBIN TIME: CPT | Performed by: INTERNAL MEDICINE

## 2018-04-10 RX ORDER — CITALOPRAM 20 MG/1
TABLET ORAL
Qty: 30 TABLET | Refills: 3 | Status: SHIPPED | OUTPATIENT
Start: 2018-04-10 | End: 2018-07-10 | Stop reason: SDUPTHER

## 2018-05-02 ENCOUNTER — NURSE ONLY (OUTPATIENT)
Dept: INTERNAL MEDICINE | Age: 63
End: 2018-05-02
Payer: COMMERCIAL

## 2018-05-02 ENCOUNTER — ANTI-COAG VISIT (OUTPATIENT)
Dept: INTERNAL MEDICINE | Age: 63
End: 2018-05-02

## 2018-05-02 DIAGNOSIS — Z79.01 LONG TERM CURRENT USE OF ANTICOAGULANT THERAPY: ICD-10-CM

## 2018-05-02 DIAGNOSIS — I48.19 PERSISTENT ATRIAL FIBRILLATION (HCC): ICD-10-CM

## 2018-05-02 DIAGNOSIS — Z79.01 LONG TERM CURRENT USE OF ANTICOAGULANT THERAPY: Primary | ICD-10-CM

## 2018-05-02 LAB
INTERNATIONAL NORMALIZATION RATIO, POC: 3
PROTHROMBIN TIME, POC: NORMAL

## 2018-05-02 PROCEDURE — 85610 PROTHROMBIN TIME: CPT | Performed by: INTERNAL MEDICINE

## 2018-05-21 ENCOUNTER — OFFICE VISIT (OUTPATIENT)
Dept: INTERNAL MEDICINE | Age: 63
End: 2018-05-21
Payer: COMMERCIAL

## 2018-05-21 ENCOUNTER — NURSE ONLY (OUTPATIENT)
Dept: INTERNAL MEDICINE | Age: 63
End: 2018-05-21
Payer: COMMERCIAL

## 2018-05-21 ENCOUNTER — ANTI-COAG VISIT (OUTPATIENT)
Dept: INTERNAL MEDICINE | Age: 63
End: 2018-05-21

## 2018-05-21 VITALS
OXYGEN SATURATION: 95 % | SYSTOLIC BLOOD PRESSURE: 108 MMHG | DIASTOLIC BLOOD PRESSURE: 78 MMHG | WEIGHT: 297 LBS | HEART RATE: 69 BPM | BODY MASS INDEX: 42.52 KG/M2 | HEIGHT: 70 IN

## 2018-05-21 DIAGNOSIS — Z79.01 LONG TERM CURRENT USE OF ANTICOAGULANT THERAPY: ICD-10-CM

## 2018-05-21 DIAGNOSIS — Z79.01 LONG TERM CURRENT USE OF ANTICOAGULANT THERAPY: Primary | ICD-10-CM

## 2018-05-21 DIAGNOSIS — I48.19 PERSISTENT ATRIAL FIBRILLATION (HCC): ICD-10-CM

## 2018-05-21 DIAGNOSIS — I42.2 CARDIOMYOPATHY, HYPERTROPHIC (HCC): ICD-10-CM

## 2018-05-21 DIAGNOSIS — Z95.810 AICD (AUTOMATIC CARDIOVERTER/DEFIBRILLATOR) PRESENT: Primary | ICD-10-CM

## 2018-05-21 DIAGNOSIS — G47.33 OBSTRUCTIVE SLEEP APNEA SYNDROME: ICD-10-CM

## 2018-05-21 DIAGNOSIS — I10 ESSENTIAL HYPERTENSION: ICD-10-CM

## 2018-05-21 PROBLEM — M54.42 BILATERAL LOW BACK PAIN WITH BILATERAL SCIATICA: Status: ACTIVE | Noted: 2018-03-13

## 2018-05-21 PROBLEM — M54.41 BILATERAL LOW BACK PAIN WITH BILATERAL SCIATICA: Status: ACTIVE | Noted: 2018-03-13

## 2018-05-21 LAB
INTERNATIONAL NORMALIZATION RATIO, POC: 3.8
PROTHROMBIN TIME, POC: NORMAL

## 2018-05-21 PROCEDURE — 99214 OFFICE O/P EST MOD 30 MIN: CPT | Performed by: INTERNAL MEDICINE

## 2018-05-21 PROCEDURE — 85610 PROTHROMBIN TIME: CPT | Performed by: INTERNAL MEDICINE

## 2018-05-21 ASSESSMENT — ENCOUNTER SYMPTOMS
RHINORRHEA: 0
SORE THROAT: 0
COUGH: 1
SHORTNESS OF BREATH: 1
NAUSEA: 0
TROUBLE SWALLOWING: 0
ABDOMINAL PAIN: 0

## 2018-06-06 ENCOUNTER — NURSE ONLY (OUTPATIENT)
Dept: INTERNAL MEDICINE | Age: 63
End: 2018-06-06
Payer: COMMERCIAL

## 2018-06-06 DIAGNOSIS — Z79.01 LONG TERM CURRENT USE OF ANTICOAGULANT THERAPY: Primary | ICD-10-CM

## 2018-06-06 LAB
INTERNATIONAL NORMALIZATION RATIO, POC: 3.4
PROTHROMBIN TIME, POC: NORMAL

## 2018-06-06 PROCEDURE — 85610 PROTHROMBIN TIME: CPT | Performed by: PHYSICIAN ASSISTANT

## 2018-06-19 DIAGNOSIS — E05.90 HYPERTHYROIDISM: ICD-10-CM

## 2018-06-19 DIAGNOSIS — E78.2 MIXED HYPERLIPIDEMIA: ICD-10-CM

## 2018-06-19 DIAGNOSIS — I10 ESSENTIAL HYPERTENSION: ICD-10-CM

## 2018-06-19 LAB
ALBUMIN SERPL-MCNC: 4.2 G/DL (ref 3.5–5.2)
ALP BLD-CCNC: 85 U/L (ref 40–130)
ALT SERPL-CCNC: 28 U/L (ref 5–41)
ANION GAP SERPL CALCULATED.3IONS-SCNC: 19 MMOL/L (ref 7–19)
AST SERPL-CCNC: 57 U/L (ref 5–40)
BASOPHILS ABSOLUTE: 0.1 K/UL (ref 0–0.2)
BASOPHILS RELATIVE PERCENT: 1.3 % (ref 0–1)
BILIRUB SERPL-MCNC: 2.9 MG/DL (ref 0.2–1.2)
BUN BLDV-MCNC: 21 MG/DL (ref 8–23)
CALCIUM SERPL-MCNC: 10.2 MG/DL (ref 8.8–10.2)
CHLORIDE BLD-SCNC: 92 MMOL/L (ref 98–111)
CHOLESTEROL, TOTAL: 110 MG/DL (ref 160–199)
CO2: 31 MMOL/L (ref 22–29)
CREAT SERPL-MCNC: 0.9 MG/DL (ref 0.5–1.2)
EOSINOPHILS ABSOLUTE: 0.2 K/UL (ref 0–0.6)
EOSINOPHILS RELATIVE PERCENT: 4 % (ref 0–5)
GFR NON-AFRICAN AMERICAN: >60
GLUCOSE BLD-MCNC: 92 MG/DL (ref 74–109)
HCT VFR BLD CALC: 41.9 % (ref 42–52)
HDLC SERPL-MCNC: 62 MG/DL (ref 55–121)
HEMOGLOBIN: 14.2 G/DL (ref 14–18)
LDL CHOLESTEROL CALCULATED: 37 MG/DL
LYMPHOCYTES ABSOLUTE: 0.8 K/UL (ref 1.1–4.5)
LYMPHOCYTES RELATIVE PERCENT: 13.9 % (ref 20–40)
MCH RBC QN AUTO: 31.6 PG (ref 27–31)
MCHC RBC AUTO-ENTMCNC: 33.9 G/DL (ref 33–37)
MCV RBC AUTO: 93.1 FL (ref 80–94)
MONOCYTES ABSOLUTE: 0.8 K/UL (ref 0–0.9)
MONOCYTES RELATIVE PERCENT: 12.9 % (ref 0–10)
NEUTROPHILS ABSOLUTE: 4 K/UL (ref 1.5–7.5)
NEUTROPHILS RELATIVE PERCENT: 67.4 % (ref 50–65)
PDW BLD-RTO: 15 % (ref 11.5–14.5)
PLATELET # BLD: 172 K/UL (ref 130–400)
PMV BLD AUTO: 10.3 FL (ref 9.4–12.4)
POTASSIUM SERPL-SCNC: 3.5 MMOL/L (ref 3.5–5)
RBC # BLD: 4.5 M/UL (ref 4.7–6.1)
SODIUM BLD-SCNC: 142 MMOL/L (ref 136–145)
T4 FREE: 1.6 NG/DL (ref 0.9–1.7)
TOTAL PROTEIN: 7.8 G/DL (ref 6.6–8.7)
TRIGL SERPL-MCNC: 54 MG/DL (ref 0–149)
TSH SERPL DL<=0.05 MIU/L-ACNC: 0.95 UIU/ML (ref 0.27–4.2)
WBC # BLD: 6 K/UL (ref 4.8–10.8)

## 2018-06-21 ENCOUNTER — NURSE ONLY (OUTPATIENT)
Dept: INTERNAL MEDICINE | Age: 63
End: 2018-06-21
Payer: COMMERCIAL

## 2018-06-21 DIAGNOSIS — Z79.01 LONG TERM CURRENT USE OF ANTICOAGULANT THERAPY: Primary | ICD-10-CM

## 2018-06-21 LAB
INTERNATIONAL NORMALIZATION RATIO, POC: 3
PROTHROMBIN TIME, POC: NORMAL

## 2018-06-21 PROCEDURE — 85610 PROTHROMBIN TIME: CPT | Performed by: PHYSICIAN ASSISTANT

## 2018-06-26 ENCOUNTER — OFFICE VISIT (OUTPATIENT)
Dept: INTERNAL MEDICINE | Age: 63
End: 2018-06-26
Payer: COMMERCIAL

## 2018-06-26 VITALS
SYSTOLIC BLOOD PRESSURE: 130 MMHG | WEIGHT: 295.6 LBS | HEIGHT: 70 IN | DIASTOLIC BLOOD PRESSURE: 80 MMHG | HEART RATE: 89 BPM | OXYGEN SATURATION: 94 % | TEMPERATURE: 96.4 F | BODY MASS INDEX: 42.32 KG/M2

## 2018-06-26 DIAGNOSIS — Z23 NEED FOR PROPHYLACTIC VACCINATION AND INOCULATION AGAINST VARICELLA: ICD-10-CM

## 2018-06-26 DIAGNOSIS — E78.2 MIXED HYPERLIPIDEMIA: ICD-10-CM

## 2018-06-26 DIAGNOSIS — E05.90 HYPERTHYROIDISM: ICD-10-CM

## 2018-06-26 DIAGNOSIS — I42.2 CARDIOMYOPATHY, HYPERTROPHIC (HCC): ICD-10-CM

## 2018-06-26 DIAGNOSIS — Z95.2 MITRAL VALVE REPLACED: ICD-10-CM

## 2018-06-26 DIAGNOSIS — Z12.5 SCREENING FOR PROSTATE CANCER: ICD-10-CM

## 2018-06-26 DIAGNOSIS — Z00.00 ROUTINE GENERAL MEDICAL EXAMINATION AT A HEALTH CARE FACILITY: Primary | ICD-10-CM

## 2018-06-26 DIAGNOSIS — Z95.810 AICD (AUTOMATIC CARDIOVERTER/DEFIBRILLATOR) PRESENT: ICD-10-CM

## 2018-06-26 DIAGNOSIS — I10 ESSENTIAL HYPERTENSION: ICD-10-CM

## 2018-06-26 DIAGNOSIS — I48.19 PERSISTENT ATRIAL FIBRILLATION (HCC): ICD-10-CM

## 2018-06-26 PROCEDURE — 99214 OFFICE O/P EST MOD 30 MIN: CPT | Performed by: PHYSICIAN ASSISTANT

## 2018-06-26 ASSESSMENT — ENCOUNTER SYMPTOMS
CONSTIPATION: 0
NAUSEA: 0
RHINORRHEA: 0
SORE THROAT: 0
WHEEZING: 0
EYE PAIN: 0
CHEST TIGHTNESS: 0
DIARRHEA: 0
BACK PAIN: 0
PHOTOPHOBIA: 0
COUGH: 0
SHORTNESS OF BREATH: 1
ABDOMINAL PAIN: 0
VOMITING: 0
SINUS PRESSURE: 0
EYE REDNESS: 0
COLOR CHANGE: 0

## 2018-07-10 ENCOUNTER — ANTI-COAG VISIT (OUTPATIENT)
Dept: INTERNAL MEDICINE | Age: 63
End: 2018-07-10

## 2018-07-10 ENCOUNTER — NURSE ONLY (OUTPATIENT)
Dept: INTERNAL MEDICINE | Age: 63
End: 2018-07-10
Payer: COMMERCIAL

## 2018-07-10 DIAGNOSIS — I48.19 PERSISTENT ATRIAL FIBRILLATION (HCC): Primary | ICD-10-CM

## 2018-07-10 DIAGNOSIS — Z79.01 LONG TERM CURRENT USE OF ANTICOAGULANT THERAPY: ICD-10-CM

## 2018-07-10 LAB
INTERNATIONAL NORMALIZATION RATIO, POC: 3.8
PROTHROMBIN TIME, POC: NORMAL

## 2018-07-10 PROCEDURE — 85610 PROTHROMBIN TIME: CPT | Performed by: PHYSICIAN ASSISTANT

## 2018-07-10 RX ORDER — CITALOPRAM 20 MG/1
TABLET ORAL
Qty: 90 TABLET | Refills: 3 | Status: SHIPPED | OUTPATIENT
Start: 2018-07-10 | End: 2019-07-28 | Stop reason: SDUPTHER

## 2018-07-10 RX ORDER — FUROSEMIDE 40 MG/1
40 TABLET ORAL 2 TIMES DAILY
Qty: 180 TABLET | Refills: 3 | Status: SHIPPED | OUTPATIENT
Start: 2018-07-10 | End: 2019-09-05 | Stop reason: SDUPTHER

## 2018-07-25 ENCOUNTER — NURSE ONLY (OUTPATIENT)
Dept: INTERNAL MEDICINE | Age: 63
End: 2018-07-25
Payer: COMMERCIAL

## 2018-07-25 DIAGNOSIS — Z79.01 LONG TERM CURRENT USE OF ANTICOAGULANT THERAPY: ICD-10-CM

## 2018-07-25 DIAGNOSIS — I48.19 PERSISTENT ATRIAL FIBRILLATION (HCC): Primary | ICD-10-CM

## 2018-07-25 LAB
INTERNATIONAL NORMALIZATION RATIO, POC: 3.5
PROTHROMBIN TIME, POC: NORMAL

## 2018-07-25 PROCEDURE — 85610 PROTHROMBIN TIME: CPT | Performed by: PHYSICIAN ASSISTANT

## 2018-08-03 RX ORDER — METOLAZONE 2.5 MG/1
TABLET ORAL
Qty: 30 TABLET | Refills: 2 | Status: SHIPPED | OUTPATIENT
Start: 2018-08-03 | End: 2019-04-07 | Stop reason: SDUPTHER

## 2018-08-03 NOTE — TELEPHONE ENCOUNTER
Pt needs a refill on the following medications which have been pended for you:    Requested Prescriptions     Pending Prescriptions Disp Refills    metolazone (ZAROXOLYN) 2.5 MG tablet 30 tablet 2     Sig: TAKE ONE TABLET BY MOUTH MONDAY, WEDNESDAY, AND FRIDAY FOR EXCESS FLUID       Next appointment: 09/28/2018  Last appointment: 06/26/2018

## 2018-08-07 ENCOUNTER — NURSE ONLY (OUTPATIENT)
Dept: INTERNAL MEDICINE | Age: 63
End: 2018-08-07
Payer: COMMERCIAL

## 2018-08-07 DIAGNOSIS — I48.19 PERSISTENT ATRIAL FIBRILLATION (HCC): Primary | ICD-10-CM

## 2018-08-07 DIAGNOSIS — Z79.01 LONG TERM CURRENT USE OF ANTICOAGULANT THERAPY: ICD-10-CM

## 2018-08-07 LAB
INTERNATIONAL NORMALIZATION RATIO, POC: 3.4
PROTHROMBIN TIME, POC: NORMAL

## 2018-08-07 PROCEDURE — 85610 PROTHROMBIN TIME: CPT | Performed by: PHYSICIAN ASSISTANT

## 2018-08-07 NOTE — PROGRESS NOTES
Mr. Duy Roa was here today. INR today:   Results for orders placed or performed in visit on 08/07/18   POCT INR   Result Value Ref Range    INR 3.4     Protime       INR Goal: 2.5-3.5    Dosing Plan  As of 8/7/2018    TTR:   63.9 % (1.1 y)   Full warfarin instructions:   2.5 mg every day            PLAN: CONTINUE CURRENT DOSE  NEXT COUMADIN CLINIC APT IS: 8/28/2018 AT 11:30AM    [facility] INTERNAL MEDICINE COUMADIN CLINIC [phone]  Summer Hours:  Tuesday's-   11:12am-10:80pm  Wednesday's- 11:12am-10:80pm  Thursday's-  6:30am-4:30pm  IF IT'S AN EMERGENCY, PLEASE CALL 911 OR GO TO YOUR NEAREST EMERGENCY ROOM. IF UNABLE TO REACH COUMADIN CLINIC, [address] [place] [address], [phone].

## 2018-08-15 ENCOUNTER — TELEPHONE (OUTPATIENT)
Dept: INTERNAL MEDICINE | Age: 63
End: 2018-08-15

## 2018-08-20 ENCOUNTER — OFFICE VISIT (OUTPATIENT)
Dept: CARDIOLOGY | Facility: CLINIC | Age: 63
End: 2018-08-20

## 2018-08-20 VITALS
SYSTOLIC BLOOD PRESSURE: 112 MMHG | DIASTOLIC BLOOD PRESSURE: 72 MMHG | HEART RATE: 62 BPM | WEIGHT: 297 LBS | HEIGHT: 71 IN | OXYGEN SATURATION: 99 % | BODY MASS INDEX: 41.58 KG/M2

## 2018-08-20 DIAGNOSIS — IMO0001 CLASS 3 OBESITY DUE TO EXCESS CALORIES WITHOUT SERIOUS COMORBIDITY WITH BODY MASS INDEX (BMI) OF 40.0 TO 44.9 IN ADULT: ICD-10-CM

## 2018-08-20 DIAGNOSIS — I42.2 HYPERTROPHIC CARDIOMYOPATHY (HCC): Primary | ICD-10-CM

## 2018-08-20 DIAGNOSIS — E78.2 MIXED HYPERLIPIDEMIA: ICD-10-CM

## 2018-08-20 DIAGNOSIS — I48.20 CHRONIC ATRIAL FIBRILLATION (HCC): ICD-10-CM

## 2018-08-20 PROCEDURE — 93000 ELECTROCARDIOGRAM COMPLETE: CPT | Performed by: INTERNAL MEDICINE

## 2018-08-20 PROCEDURE — 99214 OFFICE O/P EST MOD 30 MIN: CPT | Performed by: INTERNAL MEDICINE

## 2018-08-20 RX ORDER — DOFETILIDE 0.25 MG/1
250 CAPSULE ORAL EVERY 12 HOURS SCHEDULED
Qty: 60 CAPSULE | Refills: 11 | Status: SHIPPED | OUTPATIENT
Start: 2018-08-20 | End: 2018-08-20 | Stop reason: SDUPTHER

## 2018-08-20 RX ORDER — DOFETILIDE 0.25 MG/1
250 CAPSULE ORAL EVERY 12 HOURS SCHEDULED
Qty: 60 CAPSULE | Refills: 11 | Status: SHIPPED | OUTPATIENT
Start: 2018-08-20

## 2018-08-20 NOTE — PROGRESS NOTES
Subjective:     Encounter Date:08/20/2018      Patient ID: Pranay Gutierrez is a 62 y.o. male with a history of hypertrophic cardiomyopathy, chronic atrial fibrillation, status post multiple previous ablations, followed by Dr. Perez, ICD in place that is also followed by Dr. Perez, history of mitral valve replacement, on chronic Coumadin who presents today for follow-up.    Chief Complaint: Follow-up    Atrial Fibrillation   Presents for follow-up visit. Symptoms include shortness of breath. Symptoms are negative for an AICD problem, chest pain, dizziness, hemodynamic instability, pacemaker problem, palpitations, syncope, tachycardia and weakness. The symptoms have been stable. Past medical history includes atrial fibrillation and hyperlipidemia. There are no medication compliance problems.   Hyperlipidemia   This is a chronic problem. The problem is controlled. Recent lipid tests were reviewed and are normal. Exacerbating diseases include obesity. There are no known factors aggravating his hyperlipidemia. Associated symptoms include shortness of breath. Pertinent negatives include no chest pain, focal weakness, leg pain or myalgias. Current antihyperlipidemic treatment includes statins. The current treatment provides significant improvement of lipids. There are no compliance problems.  Risk factors for coronary artery disease include dyslipidemia, male sex and obesity.     This patient presents today for routine follow-up.  He has a history of hypertrophic cardiomyopathy as well as chronic atrial fibrillation.  The patient has an ICD in place that is followed by Dr. Perez.  He also has a history of a mitral valve replacement, mechanical, on chronic Coumadin therapy.  Overall, the patient says that he has been doing reasonably well.  He does not have any significant lightheadedness, dizziness, syncope.  He has not had any significant chest discomfort.  He does describe some degree of chronic mild shortness of  breath and dyspnea on exertion.  He also describes chronic lower extremity edema.  Symptoms do not seem to wax and wane with his edema as it is most often present.  He has not had any markedly increased lately.  He denies orthopnea or PND.  He has not had any trouble with his medications.  Blood pressure is generally well-controlled.  Coumadin levels have been therapeutic and he has not had any bleeding difficulties.  Otherwise, no complaints today.    Current Outpatient Prescriptions:   •  atorvastatin (LIPITOR) 20 MG tablet, Take 20 mg by mouth Daily., Disp: , Rfl:   •  citalopram (CeleXA) 20 MG tablet, Take 20 mg by mouth Daily., Disp: , Rfl:   •  desonide (DESOWEN) 0.05 % cream, Apply 1 application topically 2 (Two) Times a Day., Disp: , Rfl:   •  dofetilide (TIKOSYN) 250 MCG capsule, Take 1 capsule by mouth Every 12 (Twelve) Hours., Disp: 60 capsule, Rfl: 11  •  furosemide (LASIX) 80 MG tablet, Take 80 mg by mouth Daily., Disp: , Rfl:   •  latanoprost (XALATAN) 0.005 % ophthalmic solution, 1 drop Every Night., Disp: , Rfl:   •  levalbuterol (XOPENEX HFA) 45 MCG/ACT inhaler, Inhale 1-2 puffs Every 4 (Four) Hours As Needed for Wheezing., Disp: , Rfl:   •  magnesium oxide (MAGOX) 400 (241.3 Mg) MG tablet tablet, Take 400 mg by mouth 2 (Two) Times a Day., Disp: , Rfl:   •  Melatonin 10 MG tablet, Take 10 mg by mouth Every Night., Disp: , Rfl:   •  metOLazone (ZAROXOLYN) 2.5 MG tablet, Take 2.5 mg by mouth Daily. 1 TABLET PO EVERY Monday, Wednesday & Friday, Disp: , Rfl:   •  metoprolol succinate XL (TOPROL-XL) 50 MG 24 hr tablet, Take 75 mg by mouth 2 (Two) Times a Day., Disp: , Rfl:   •  Mometasone Furoate (ASMANEX HFA) 200 MCG/ACT aerosol, Inhale 2 puffs 2 (Two) Times a Day., Disp: , Rfl:   •  potassium chloride (K-DUR,KLOR-CON) 20 MEQ CR tablet, Take 20 mEq by mouth 2 (Two) Times a Day. 30 mg am & 30 mg pm, Disp: , Rfl:   •  propylthiouracil (PTU) 50 MG tablet, Take 50 mg by mouth Daily., Disp: , Rfl:   •   spironolactone (ALDACTONE) 25 MG tablet, Take 25 mg by mouth Daily., Disp: , Rfl:   •  warfarin (COUMADIN) 2 MG tablet, Take 2 mg by mouth Daily., Disp: , Rfl:     Allergies   Allergen Reactions   • Penicillins Hives     Social History   Substance Use Topics   • Smoking status: Never Smoker   • Smokeless tobacco: Never Used   • Alcohol use No     Review of Systems   Constitution: Positive for malaise/fatigue. Negative for chills, fever, weakness, night sweats and weight loss.   HENT: Negative for congestion and sore throat.    Cardiovascular: Positive for leg swelling. Negative for chest pain, claudication, irregular heartbeat, orthopnea, palpitations, paroxysmal nocturnal dyspnea and syncope.   Respiratory: Positive for shortness of breath. Negative for cough, hemoptysis and wheezing.    Endocrine: Negative for cold intolerance, heat intolerance, polydipsia and polyuria.   Hematologic/Lymphatic: Negative for bleeding problem. Does not bruise/bleed easily.   Musculoskeletal: Negative for muscle cramps and myalgias.   Gastrointestinal: Negative for abdominal pain, hematemesis, hematochezia, melena, nausea and vomiting.   Genitourinary: Negative for bladder incontinence, dysuria and hematuria.   Neurological: Negative for dizziness, focal weakness, headaches, loss of balance, numbness, paresthesias and seizures.       ECG 12 Lead  Date/Time: 8/20/2018 3:14 PM  Performed by: SOTO VILLA  Authorized by: SOTO VILLA   Comparison: compared with previous ECG from 8/17/2017  Similar to previous ECG  Rhythm: paced  Rate: normal  BPM: 72  Clinical impression: abnormal ECG  Comments: AV paced             Objective:     Physical Exam   Constitutional: He is oriented to person, place, and time. He appears well-developed and well-nourished. No distress.   HENT:   Head: Normocephalic and atraumatic.   Mouth/Throat: Oropharynx is clear and moist.   Eyes: Pupils are equal, round, and reactive to light. EOM are  "normal.   Neck: Normal range of motion. Neck supple. No JVD present. No thyromegaly present.   Cardiovascular: Normal rate, regular rhythm, S1 normal, S2 normal, normal heart sounds and intact distal pulses.  Exam reveals no gallop and no friction rub.    No murmur heard.  Pulmonary/Chest: Effort normal and breath sounds normal.   Abdominal: Soft. Bowel sounds are normal. He exhibits no distension. There is no tenderness.   Musculoskeletal: Normal range of motion. He exhibits no edema.   Neurological: He is alert and oriented to person, place, and time. No cranial nerve deficit.   Skin: Skin is warm and dry. No rash noted. No cyanosis or erythema. Nails show no clubbing.   Psychiatric: He has a normal mood and affect.   Vitals reviewed.    /72 (BP Location: Left arm, Patient Position: Sitting)   Pulse 62   Ht 180.3 cm (71\")   Wt 135 kg (297 lb)   SpO2 99%   BMI 41.42 kg/m²     Lab/Data Review:     Lipids on 6/19/18: LDL cholesterol 37, HDL 62, triglycerides 54    Echo 8/25/17:  · Left ventricular systolic function is normal. Estimated EF appears to be in the range of 56 - 60%.  · Left ventricular diastolic dysfunction.  · Left ventricular wall thickness is consistent with moderate septal asymmetric hypertrophy.  · No evidence of left venrticular outflow gradient at present time.  · There is a mechanical mitral valve prosthesis present. The prosthetic valve is normal.  · Mild-to-moderate pulmonic valve regurgitation is present.  · Trace tricuspid valve regurgitation is present. Estimated right ventricular systolic pressure from tricuspid regurgitation is normal (<35 mmHg).      Assessment:          Diagnosis Plan   1. Hypertrophic cardiomyopathy (CMS/HCC)  ECG 12 Lead   2. Chronic atrial fibrillation (CMS/HCC)  dofetilide (TIKOSYN) 250 MCG capsule   3. Mixed hyperlipidemia     4. Class 3 obesity due to excess calories without serious comorbidity with body mass index (BMI) of 40.0 to 44.9 in adult (CMS/HCC) "            Plan:       1.  Hypertrophic cardiomyopathy: Clinically stable at this time.  His most recent echocardiogram is referenced above.  There are no symptoms of left ventricular outflow obstruction at this particular time, therefore no changes in therapy.     2.   Chronic atrial fibrillation: The patient does continue to follow with Dr. Perez for this problem and Dr. Perez also follows the patient's ICD.  The patient does remain on Tikosyn, child refilled for him today.  He also remains anticoagulated, mostly due to his history of mechanical mitral valve replacement.    3.  Mixed hyperlipidemia: The patient remains on statin therapy.  His lipids are referenced above.    4.  Obesity: Patient's Body mass index is 41.42 kg/m². BMI is above normal parameters. Recommendations include: educational material, exercise counseling and nutrition counseling.    Also, I did recommend the use of compression stockings on a daily basis which should help with what is likely chronic venous insufficiency.     Follow-up: 12 months unless needed sooner

## 2018-08-20 NOTE — PATIENT INSTRUCTIONS
Obesity, Adult  Obesity is the condition of having too much total body fat. Being overweight or obese means that your weight is greater than what is considered healthy for your body size. Obesity is determined by a measurement called BMI. BMI is an estimate of body fat and is calculated from height and weight. For adults, a BMI of 30 or higher is considered obese.  Obesity can eventually lead to other health concerns and major illnesses, including:  · Stroke.  · Coronary artery disease (CAD).  · Type 2 diabetes.  · Some types of cancer, including cancers of the colon, breast, uterus, and gallbladder.  · Osteoarthritis.  · High blood pressure (hypertension).  · High cholesterol.  · Sleep apnea.  · Gallbladder stones.  · Infertility problems.    What are the causes?  The main cause of obesity is taking in (consuming) more calories than your body uses for energy. Other factors that contribute to this condition may include:  · Being born with genes that make you more likely to become obese.  · Having a medical condition that causes obesity. These conditions include:  ? Hypothyroidism.  ? Polycystic ovarian syndrome (PCOS).  ? Binge-eating disorder.  ? Cushing syndrome.  · Taking certain medicines, such as steroids, antidepressants, and seizure medicines.  · Not being physically active (sedentary lifestyle).  · Living where there are limited places to exercise safely or buy healthy foods.  · Not getting enough sleep.    What increases the risk?  The following factors may increase your risk of this condition:  · Having a family history of obesity.  · Being a woman of -American descent.  · Being a man of  descent.    What are the signs or symptoms?  Having excessive body fat is the main symptom of this condition.  How is this diagnosed?  This condition may be diagnosed based on:  · Your symptoms.  · Your medical history.  · A physical exam. Your health care provider may measure:  ? Your BMI. If you are  an adult with a BMI between 25 and less than 30, you are considered overweight. If you are an adult with a BMI of 30 or higher, you are considered obese.  ? The distances around your hips and your waist (circumferences). These may be compared to each other to help diagnose your condition.  ? Your skinfold thickness. Your health care provider may gently pinch a fold of your skin and measure it.    How is this treated?  Treatment for this condition often includes changing your lifestyle. Treatment may include some or all of the following:  · Dietary changes. Work with your health care provider and a dietitian to set a weight-loss goal that is healthy and reasonable for you. Dietary changes may include eating:  ? Smaller portions. A portion size is the amount of a particular food that is healthy for you to eat at one time. This varies from person to person.  ? Low-calorie or low-fat options.  ? More whole grains, fruits, and vegetables.  · Regular physical activity. This may include aerobic activity (cardio) and strength training.  · Medicine to help you lose weight. Your health care provider may prescribe medicine if you are unable to lose 1 pound a week after 6 weeks of eating more healthily and doing more physical activity.  · Surgery. Surgical options may include gastric banding and gastric bypass. Surgery may be done if:  ? Other treatments have not helped to improve your condition.  ? You have a BMI of 40 or higher.  ? You have life-threatening health problems related to obesity.    Follow these instructions at home:    Eating and drinking    · Follow recommendations from your health care provider about what you eat and drink. Your health care provider may advise you to:  ? Limit fast foods, sweets, and processed snack foods.  ? Choose low-fat options, such as low-fat milk instead of whole milk.  ? Eat 5 or more servings of fruits or vegetables every day.  ? Eat at home more often. This gives you more control  over what you eat.  ? Choose healthy foods when you eat out.  ? Learn what a healthy portion size is.  ? Keep low-fat snacks on hand.  ? Avoid sugary drinks, such as soda, fruit juice, iced tea sweetened with sugar, and flavored milk.  ? Eat a healthy breakfast.  · Drink enough water to keep your urine clear or pale yellow.  · Do not go without eating for long periods of time (do not fast) or follow a fad diet. Fasting and fad diets can be unhealthy and even dangerous.  Physical Activity  · Exercise regularly, as told by your health care provider. Ask your health care provider what types of exercise are safe for you and how often you should exercise.  · Warm up and stretch before being active.  · Cool down and stretch after being active.  · Rest between periods of activity.  Lifestyle  · Limit the time that you spend in front of your TV, computer, or video game system.  · Find ways to reward yourself that do not involve food.  · Limit alcohol intake to no more than 1 drink a day for nonpregnant women and 2 drinks a day for men. One drink equals 12 oz of beer, 5 oz of wine, or 1½ oz of hard liquor.  General instructions  · Keep a weight loss journal to keep track of the food you eat and how much you exercise you get.  · Take over-the-counter and prescription medicines only as told by your health care provider.  · Take vitamins and supplements only as told by your health care provider.  · Consider joining a support group. Your health care provider may be able to recommend a support group.  · Keep all follow-up visits as told by your health care provider. This is important.  Contact a health care provider if:  · You are unable to meet your weight loss goal after 6 weeks of dietary and lifestyle changes.  This information is not intended to replace advice given to you by your health care provider. Make sure you discuss any questions you have with your health care provider.  Document Released: 01/25/2006 Document  Revised: 05/22/2017 Document Reviewed: 10/05/2016  "[employer] Interactive Patient Education © 2018 "[employer] Inc.      Exercising to Lose Weight  Exercising can help you to lose weight. In order to lose weight through exercise, you need to do vigorous-intensity exercise. You can tell that you are exercising with vigorous intensity if you are breathing very hard and fast and cannot hold a conversation while exercising.  Moderate-intensity exercise helps to maintain your current weight. You can tell that you are exercising at a moderate level if you have a higher heart rate and faster breathing, but you are still able to hold a conversation.  How often should I exercise?  Choose an activity that you enjoy and set realistic goals. Your health care provider can help you to make an activity plan that works for you. Exercise regularly as directed by your health care provider. This may include:  · Doing resistance training twice each week, such as:  ? Push-ups.  ? Sit-ups.  ? Lifting weights.  ? Using resistance bands.  · Doing a given intensity of exercise for a given amount of time. Choose from these options:  ? 150 minutes of moderate-intensity exercise every week.  ? 75 minutes of vigorous-intensity exercise every week.  ? A mix of moderate-intensity and vigorous-intensity exercise every week.    Children, pregnant women, people who are out of shape, people who are overweight, and older adults may need to consult a health care provider for individual recommendations. If you have any sort of medical condition, be sure to consult your health care provider before starting a new exercise program.  What are some activities that can help me to lose weight?  · Walking at a rate of at least 4.5 miles an hour.  · Jogging or running at a rate of 5 miles per hour.  · Biking at a rate of at least 10 miles per hour.  · Lap swimming.  · Roller-skating or in-line skating.  · Cross-country skiing.  · Vigorous competitive sports, such as  football, basketball, and soccer.  · Jumping rope.  · Aerobic dancing.  How can I be more active in my day-to-day activities?  · Use the stairs instead of the elevator.  · Take a walk during your lunch break.  · If you drive, park your car farther away from work or school.  · If you take public transportation, get off one stop early and walk the rest of the way.  · Make all of your phone calls while standing up and walking around.  · Get up, stretch, and walk around every 30 minutes throughout the day.  What guidelines should I follow while exercising?  · Do not exercise so much that you hurt yourself, feel dizzy, or get very short of breath.  · Consult your health care provider prior to starting a new exercise program.  · Wear comfortable clothes and shoes with good support.  · Drink plenty of water while you exercise to prevent dehydration or heat stroke. Body water is lost during exercise and must be replaced.  · Work out until you breathe faster and your heart beats faster.  This information is not intended to replace advice given to you by your health care provider. Make sure you discuss any questions you have with your health care provider.  Document Released: 01/20/2012 Document Revised: 05/25/2017 Document Reviewed: 05/21/2015  [employer] Interactive Patient Education © 2018 [employer]

## 2018-08-28 ENCOUNTER — NURSE ONLY (OUTPATIENT)
Dept: INTERNAL MEDICINE | Age: 63
End: 2018-08-28
Payer: COMMERCIAL

## 2018-08-28 DIAGNOSIS — I48.19 PERSISTENT ATRIAL FIBRILLATION (HCC): Primary | ICD-10-CM

## 2018-08-28 DIAGNOSIS — Z79.01 LONG TERM CURRENT USE OF ANTICOAGULANT THERAPY: ICD-10-CM

## 2018-08-28 LAB
INTERNATIONAL NORMALIZATION RATIO, POC: 3
PROTHROMBIN TIME, POC: NORMAL

## 2018-08-28 PROCEDURE — 85610 PROTHROMBIN TIME: CPT | Performed by: PHYSICIAN ASSISTANT

## 2018-08-29 ENCOUNTER — ANTI-COAG VISIT (OUTPATIENT)
Dept: INTERNAL MEDICINE | Age: 63
End: 2018-08-29

## 2018-08-29 DIAGNOSIS — Z79.01 LONG TERM CURRENT USE OF ANTICOAGULANT THERAPY: ICD-10-CM

## 2018-08-29 LAB — INR BLD: 2.5

## 2018-09-04 RX ORDER — SPIRONOLACTONE 25 MG/1
25 TABLET ORAL DAILY
Qty: 30 TABLET | Refills: 5 | Status: SHIPPED | OUTPATIENT
Start: 2018-09-04

## 2018-09-05 ENCOUNTER — ANTI-COAG VISIT (OUTPATIENT)
Dept: INTERNAL MEDICINE | Age: 63
End: 2018-09-05

## 2018-09-05 DIAGNOSIS — Z79.01 LONG TERM CURRENT USE OF ANTICOAGULANT THERAPY: ICD-10-CM

## 2018-09-05 LAB — INR BLD: 2.4

## 2018-09-05 NOTE — PROGRESS NOTES
HOME MONITORING REPORT    INR today:   Results for orders placed or performed in visit on 09/05/18   Protime-INR   Result Value Ref Range    INR 2.40        INR Goal: 2.5-3.5    Dosing Plan  As of 9/5/2018    TTR:   66.1 % (1.1 y)   Full warfarin instructions:   9/5: 3.75 mg; Otherwise 2.5 mg every day              Patient states he ate more broccoli than normal, and thinks that why he is thick. Instructed pt to take 3.75mg tonight, then resume usual dose and recheck in 1 week. Patient voiced understanding.

## 2018-09-12 ENCOUNTER — ANTI-COAG VISIT (OUTPATIENT)
Dept: INTERNAL MEDICINE | Age: 63
End: 2018-09-12

## 2018-09-12 DIAGNOSIS — Z79.01 LONG TERM CURRENT USE OF ANTICOAGULANT THERAPY: ICD-10-CM

## 2018-09-12 LAB — INR BLD: 2.6

## 2018-09-12 NOTE — PROGRESS NOTES
HOME MONITORING REPORT    INR today:   Results for orders placed or performed in visit on 09/12/18   Protime-INR   Result Value Ref Range    INR 2.60        INR Goal: 2.5-3.5    Dosing Plan  As of 9/12/2018    TTR:   65.8 % (1.2 y)   Full warfarin instructions:   2.5 mg every day              PLAN:LEFT VOICEMAIL FOR PT TO  CONTINUE CURRENT DOSE AND RECHECK IN ONE WEEK. AND TO CALL BACK WITH ANY QUESTIONS OR CONCERNS.

## 2018-09-19 LAB — INR BLD: 2.8

## 2018-09-19 RX ORDER — PROPYLTHIOURACIL 50 MG/1
50 TABLET ORAL DAILY
Qty: 30 TABLET | Refills: 5 | Status: SHIPPED | OUTPATIENT
Start: 2018-09-19 | End: 2019-03-21 | Stop reason: SDUPTHER

## 2018-09-20 ENCOUNTER — ANTI-COAG VISIT (OUTPATIENT)
Dept: INTERNAL MEDICINE | Age: 63
End: 2018-09-20

## 2018-09-20 DIAGNOSIS — Z79.01 LONG TERM CURRENT USE OF ANTICOAGULANT THERAPY: ICD-10-CM

## 2018-09-20 NOTE — PROGRESS NOTES
HOME MONITORING REPORT    INR today:   Results for orders placed or performed in visit on 09/20/18   Protime-INR   Result Value Ref Range    INR 2.80        INR Goal: 2.5-3.5    Dosing Plan  As of 9/20/2018    TTR:   66.4 % (1.2 y)   Full warfarin instructions:   2.5 mg every day              PLAN:PATIENT NOTIFIED TO  CONTINUE CURRENT DOSE AND RECHECK IN ONE WEEK.

## 2018-09-21 DIAGNOSIS — I10 ESSENTIAL HYPERTENSION: ICD-10-CM

## 2018-09-21 DIAGNOSIS — E78.2 MIXED HYPERLIPIDEMIA: ICD-10-CM

## 2018-09-21 DIAGNOSIS — Z95.2 MITRAL VALVE REPLACED: ICD-10-CM

## 2018-09-21 DIAGNOSIS — E05.90 HYPERTHYROIDISM: ICD-10-CM

## 2018-09-21 DIAGNOSIS — Z12.5 SCREENING FOR PROSTATE CANCER: ICD-10-CM

## 2018-09-21 LAB
ALBUMIN SERPL-MCNC: 4 G/DL (ref 3.5–5.2)
ALP BLD-CCNC: 83 U/L (ref 40–130)
ALT SERPL-CCNC: 24 U/L (ref 5–41)
ANION GAP SERPL CALCULATED.3IONS-SCNC: 12 MMOL/L (ref 7–19)
AST SERPL-CCNC: 43 U/L (ref 5–40)
BASOPHILS ABSOLUTE: 0.1 K/UL (ref 0–0.2)
BASOPHILS RELATIVE PERCENT: 1.3 % (ref 0–1)
BILIRUB SERPL-MCNC: 2.6 MG/DL (ref 0.2–1.2)
BUN BLDV-MCNC: 20 MG/DL (ref 8–23)
CALCIUM SERPL-MCNC: 9.7 MG/DL (ref 8.8–10.2)
CHLORIDE BLD-SCNC: 98 MMOL/L (ref 98–111)
CHOLESTEROL, TOTAL: 95 MG/DL (ref 160–199)
CO2: 30 MMOL/L (ref 22–29)
CREAT SERPL-MCNC: 0.8 MG/DL (ref 0.5–1.2)
EOSINOPHILS ABSOLUTE: 0.2 K/UL (ref 0–0.6)
EOSINOPHILS RELATIVE PERCENT: 3 % (ref 0–5)
GFR NON-AFRICAN AMERICAN: >60
GLUCOSE BLD-MCNC: 89 MG/DL (ref 74–109)
HCT VFR BLD CALC: 43.6 % (ref 42–52)
HDLC SERPL-MCNC: 52 MG/DL (ref 55–121)
HEMOGLOBIN: 14.1 G/DL (ref 14–18)
LDL CHOLESTEROL CALCULATED: 34 MG/DL
LYMPHOCYTES ABSOLUTE: 0.8 K/UL (ref 1.1–4.5)
LYMPHOCYTES RELATIVE PERCENT: 14.1 % (ref 20–40)
MCH RBC QN AUTO: 30.7 PG (ref 27–31)
MCHC RBC AUTO-ENTMCNC: 32.3 G/DL (ref 33–37)
MCV RBC AUTO: 95 FL (ref 80–94)
MONOCYTES ABSOLUTE: 0.7 K/UL (ref 0–0.9)
MONOCYTES RELATIVE PERCENT: 10.9 % (ref 0–10)
NEUTROPHILS ABSOLUTE: 4.2 K/UL (ref 1.5–7.5)
NEUTROPHILS RELATIVE PERCENT: 70.2 % (ref 50–65)
PDW BLD-RTO: 15.3 % (ref 11.5–14.5)
PLATELET # BLD: 148 K/UL (ref 130–400)
PMV BLD AUTO: 10.2 FL (ref 9.4–12.4)
POTASSIUM SERPL-SCNC: 3.9 MMOL/L (ref 3.5–5)
PROSTATE SPECIFIC ANTIGEN: 0.16 NG/ML (ref 0–4)
RBC # BLD: 4.59 M/UL (ref 4.7–6.1)
SODIUM BLD-SCNC: 140 MMOL/L (ref 136–145)
TOTAL PROTEIN: 7.5 G/DL (ref 6.6–8.7)
TRIGL SERPL-MCNC: 43 MG/DL (ref 0–149)
TSH SERPL DL<=0.05 MIU/L-ACNC: 1.9 UIU/ML (ref 0.27–4.2)
WBC # BLD: 5.9 K/UL (ref 4.8–10.8)

## 2018-09-26 LAB — INR BLD: 2.9

## 2018-09-27 ENCOUNTER — ANTI-COAG VISIT (OUTPATIENT)
Dept: INTERNAL MEDICINE | Age: 63
End: 2018-09-27

## 2018-09-27 DIAGNOSIS — Z79.01 LONG TERM CURRENT USE OF ANTICOAGULANT THERAPY: ICD-10-CM

## 2018-09-27 NOTE — PROGRESS NOTES
HOME MONITORING REPORT    INR today:   Results for orders placed or performed in visit on 09/27/18   Protime-INR   Result Value Ref Range    INR 2.90        INR Goal: 2.5-3.5    Dosing Plan  As of 9/27/2018    TTR:   66.9 % (1.2 y)   Full warfarin instructions:   2.5 mg every day              PLAN:PATIENT NOTIFIED TO  CONTINUE CURRENT DOSE AND RECHECK IN ONE WEEK.

## 2018-09-28 ENCOUNTER — OFFICE VISIT (OUTPATIENT)
Dept: INTERNAL MEDICINE | Age: 63
End: 2018-09-28
Payer: COMMERCIAL

## 2018-09-28 ENCOUNTER — TELEPHONE (OUTPATIENT)
Dept: CARDIOLOGY | Facility: CLINIC | Age: 63
End: 2018-09-28

## 2018-09-28 VITALS
DIASTOLIC BLOOD PRESSURE: 80 MMHG | TEMPERATURE: 96.6 F | HEIGHT: 71 IN | OXYGEN SATURATION: 96 % | SYSTOLIC BLOOD PRESSURE: 120 MMHG | HEART RATE: 82 BPM | BODY MASS INDEX: 42.19 KG/M2 | WEIGHT: 301.4 LBS

## 2018-09-28 DIAGNOSIS — I42.2 CARDIOMYOPATHY, HYPERTROPHIC (HCC): ICD-10-CM

## 2018-09-28 DIAGNOSIS — Z00.00 ROUTINE GENERAL MEDICAL EXAMINATION AT A HEALTH CARE FACILITY: Primary | ICD-10-CM

## 2018-09-28 DIAGNOSIS — Z23 NEED FOR VACCINATION: ICD-10-CM

## 2018-09-28 DIAGNOSIS — I10 ESSENTIAL HYPERTENSION: ICD-10-CM

## 2018-09-28 DIAGNOSIS — E78.2 MIXED HYPERLIPIDEMIA: ICD-10-CM

## 2018-09-28 DIAGNOSIS — R60.0 LOCALIZED EDEMA: ICD-10-CM

## 2018-09-28 DIAGNOSIS — I48.19 PERSISTENT ATRIAL FIBRILLATION (HCC): ICD-10-CM

## 2018-09-28 DIAGNOSIS — Z79.01 LONG TERM CURRENT USE OF ANTICOAGULANT THERAPY: ICD-10-CM

## 2018-09-28 DIAGNOSIS — Z95.2 MITRAL VALVE REPLACED: ICD-10-CM

## 2018-09-28 DIAGNOSIS — Z95.810 AICD (AUTOMATIC CARDIOVERTER/DEFIBRILLATOR) PRESENT: ICD-10-CM

## 2018-09-28 DIAGNOSIS — G47.33 OBSTRUCTIVE SLEEP APNEA SYNDROME: ICD-10-CM

## 2018-09-28 DIAGNOSIS — E05.90 HYPERTHYROIDISM: ICD-10-CM

## 2018-09-28 PROCEDURE — 90471 IMMUNIZATION ADMIN: CPT | Performed by: PHYSICIAN ASSISTANT

## 2018-09-28 PROCEDURE — 90686 IIV4 VACC NO PRSV 0.5 ML IM: CPT | Performed by: PHYSICIAN ASSISTANT

## 2018-09-28 PROCEDURE — 99214 OFFICE O/P EST MOD 30 MIN: CPT | Performed by: PHYSICIAN ASSISTANT

## 2018-09-28 ASSESSMENT — ENCOUNTER SYMPTOMS
NAUSEA: 0
VOMITING: 0
SORE THROAT: 0
COUGH: 0
CHEST TIGHTNESS: 0
BACK PAIN: 0
DIARRHEA: 0
CONSTIPATION: 0
EYE REDNESS: 0
ABDOMINAL PAIN: 0
COLOR CHANGE: 0
EYE PAIN: 0
PHOTOPHOBIA: 0
RHINORRHEA: 0
SHORTNESS OF BREATH: 1
SINUS PRESSURE: 0
WHEEZING: 0

## 2018-09-28 ASSESSMENT — PATIENT HEALTH QUESTIONNAIRE - PHQ9
SUM OF ALL RESPONSES TO PHQ QUESTIONS 1-9: 0
2. FEELING DOWN, DEPRESSED OR HOPELESS: 0
SUM OF ALL RESPONSES TO PHQ9 QUESTIONS 1 & 2: 0
1. LITTLE INTEREST OR PLEASURE IN DOING THINGS: 0
SUM OF ALL RESPONSES TO PHQ QUESTIONS 1-9: 0

## 2018-09-28 NOTE — PROGRESS NOTES
 Chronic liver disease     Depression     Fatty liver     GERD (gastroesophageal reflux disease)     Glaucoma     Hyperlipidemia     Hypertension     Hyperthyroidism     amiodarone induced     Mitral valve replaced     2006, mechanical MVR, Dr Simona Cain Sleep apnea     dx 2010, P     Transient ischemic attack     Vestibular neuronitis 12/20/2017       Prior to Visit Medications    Medication Sig Taking? Authorizing Provider   propylthiouracil (PTU) 50 MG tablet Take 1 tablet by mouth daily Yes Griselda Kamille, PA   spironolactone (ALDACTONE) 25 MG tablet Take 1 tablet by mouth daily Yes Griselda Kamille, PA   metolazone (ZAROXOLYN) 2.5 MG tablet TAKE ONE TABLET BY MOUTH MONDAY, WEDNESDAY, AND FRIDAY FOR EXCESS FLUID Yes Griselda Kamille, PA   citalopram (CELEXA) 20 MG tablet TAKE ONE TABLET BY MOUTH DAILY Yes Griselda Kamille, PA   furosemide (LASIX) 40 MG tablet Take 1 tablet by mouth 2 times daily Yes Griselda Kamille, PA   promethazine (PHENERGAN) 25 MG tablet Take 25 mg by mouth every 8 hours as needed for Nausea Yes Historical Provider, MD   meclizine (ANTIVERT) 25 MG tablet Take 1 tablet by mouth 3 times daily as needed for Dizziness Yes Toro Desai MD   potassium chloride (KLOR-CON) 10 MEQ extended release tablet Take 3 tablets twice daily.  Yes Toro Desai MD   atorvastatin (LIPITOR) 20 MG tablet TAKE ONE TABLET BY MOUTH DAILY Yes Toro Desai MD   warfarin (COUMADIN) 2.5 MG tablet TAKE 2 TABLETS BY MOUTH EVERY MONDAY AND TAKE 1 TABLET BY MOUTH DAILY THE REST OF THE WEEK  Patient taking differently: 1 tablet daily Yes Toro Desai MD   melatonin 5 MG TABS tablet Take 5 mg by mouth daily Yes Historical Provider, MD   magnesium oxide (MAG-OX) 400 MG tablet Take 400 mg by mouth 2 times daily Yes Historical Provider, MD   mometasone (ASMANEX HFA) 200 MCG/ACT AERO inhaler Inhale 1 puff into the lungs 2 times daily Yes Historical Provider, MD   desonide (DESOWEN) 0.05 % cream Apply

## 2018-09-28 NOTE — PROGRESS NOTES
[facility]  [address] [facility] [address]  Dept: [phone]  Dept Fax: [phone]: [phone]      [facility] INTERNAL MEDICINE  OFFICE NOTE      Chief Complaint   Patient presents with    Other     3 month f/u, shortness of breath, dizzy        Damaris Valentine is a 59y.o. year old male who is seen for evaluation of Hypertension, hyperlipidemia, A. fib, hyperthyroidism, mitral valve replacement. ICD in place, hypertrophic cardiomyopathy, sleep apnea with use of oxygen and CPAP. On chronic Coumadin. Patient states doing well overall. Patient states currently seeing Dr. Kristi Levin and Dr. Jo Ann Rojas for A. fib and mitral valve replacement. Patient denies any chest pains. Patient states has periodic shortness of breath Due to the cardiac issues nothing worse than baseline. Patient saw Dr. Kristi Levin on 20 August and for some reason his Tikosyn was decreased from 500 mcg twice a day to 250 µg twice a day and patient is unsure why this change was made. Patient states that he thinks that his dizziness got a little worse once they reduced the medication. Patient states also has some vertigo and takes meclizine which is does help some. Patient denies any weakness or numbness. States he is going to call over to Dr. Tara Oropeza office and see if this is a oversight. Patient also saw Dr. Jo Ann Rojas on 5 July 2018 and his ICD was interrogated. A. fib was supposedly in control. It is cardiac defibrillator was working normally. Patient also had small extremity edema and was advised to take some Zaroxolyn for 3 straight days to get the fluid down. Patient states he bought compression hose that is been working well. Patient states had a colonoscopy in 2015. Patient states was told 5 year follow-up. Patient states continues to take his diuretics for edema in the lower legs.   Patient states his blood pressures been fairly well controlled on current medication regimen. Patient states uses CPAP at night. Patient states does use respirator when outside to prevent any dust inhalation. Last INR was 2.90. Patient states he needs paperwork filled out for his disability ID card which is required and old always and is required from the [facility].     Active Ambulatory Problems     Diagnosis Date Noted    Long term current use of anticoagulant therapy 07/05/2017    Sleep apnea     Hyperthyroidism     Hypertension     Hyperlipidemia     Atrial fibrillation (HCC)     Asthma     Cardiomyopathy, hypertrophic ([address]     Chronic liver disease     Mitral valve replaced     Need for Zostavax administration 07/26/2017    AICD (automatic cardioverter/defibrillator) present 10/31/2017    Statin intolerance 10/31/2017    Vestibular neuronitis 12/20/2017    Bilateral low back pain with bilateral sciatica 03/13/2018     Resolved Ambulatory Problems     Diagnosis Date Noted    No Resolved Ambulatory Problems     Past Medical History:   Diagnosis Date    Anticoagulated on Coumadin     Asthma     Atrial fibrillation ([address]     Bilateral low back pain with bilateral sciatica 3/13/2018    Cardiomyopathy, hypertrophic ([address]     Chronic liver disease     Depression     Fatty liver     GERD (gastroesophageal reflux disease)     Glaucoma     Hyperlipidemia     Hypertension     Hyperthyroidism     Mitral valve replaced     Sleep apnea     Transient ischemic attack     Vestibular neuronitis 12/20/2017       Past Medical History:   Diagnosis Date    Anticoagulated on Coumadin     Asthma     Atrial fibrillation ([address]     2006 atrial ablation , Dr Timothy Montano; 2010 pulm vein isolation, ICD placed, [facility];  2012 INGRID, cardioversion , Dr Sherryle Gash, [facility]     Bilateral low back pain with bilateral sciatica 3/13/2018    Cardiomyopathy, hypertrophic ([address]     Chronic liver disease     Depression     Fatty liver     GERD (gastroesophageal reflux disease) of breath. Negative for cough, chest tightness and wheezing. Cardiovascular: Positive for leg swelling. Negative for chest pain and palpitations. Gastrointestinal: Negative for abdominal pain, constipation, diarrhea, nausea and vomiting. Genitourinary: Positive for frequency (on diuretic). Negative for dysuria, hematuria and urgency. Musculoskeletal: Negative for arthralgias, back pain, gait problem, joint swelling and myalgias. Skin: Negative for color change, pallor and wound. Neurological: Positive for dizziness. Negative for speech difficulty, weakness, light-headedness, numbness and headaches. Hematological: Negative for adenopathy. Does not bruise/bleed easily. Psychiatric/Behavioral: Negative for confusion. The patient is not nervous/anxious. Current Outpatient Prescriptions   Medication Sig Dispense Refill    propylthiouracil (PTU) 50 MG tablet Take 1 tablet by mouth daily 30 tablet 5    spironolactone (ALDACTONE) 25 MG tablet Take 1 tablet by mouth daily 30 tablet 5    metolazone (ZAROXOLYN) 2.5 MG tablet TAKE ONE TABLET BY MOUTH MONDAY, WEDNESDAY, AND FRIDAY FOR EXCESS FLUID 30 tablet 2    citalopram (CELEXA) 20 MG tablet TAKE ONE TABLET BY MOUTH DAILY 90 tablet 3    furosemide (LASIX) 40 MG tablet Take 1 tablet by mouth 2 times daily 180 tablet 3    promethazine (PHENERGAN) 25 MG tablet Take 25 mg by mouth every 8 hours as needed for Nausea      meclizine (ANTIVERT) 25 MG tablet Take 1 tablet by mouth 3 times daily as needed for Dizziness 30 tablet 1    potassium chloride (KLOR-CON) 10 MEQ extended release tablet Take 3 tablets twice daily.  540 tablet 3    atorvastatin (LIPITOR) 20 MG tablet TAKE ONE TABLET BY MOUTH DAILY 90 tablet 3    warfarin (COUMADIN) 2.5 MG tablet TAKE 2 TABLETS BY MOUTH EVERY MONDAY AND TAKE 1 TABLET BY MOUTH DAILY THE REST OF THE WEEK (Patient taking differently: 1 tablet daily) 90 tablet 4    melatonin 5 MG TABS tablet Take 5 mg by mouth

## 2018-09-28 NOTE — TELEPHONE ENCOUNTER
Patient called and said when he picked up the Tikosyn it was 250 mg bid instead of 500 mg bid that he usually takes. He said when he took the 250 mg he could tell the difference and he didn't think it was for the good.    I called [facility] and changed the prescription to the 500 mg bid that he should be taking with 11 refills.

## 2018-10-03 ENCOUNTER — ANTI-COAG VISIT (OUTPATIENT)
Dept: INTERNAL MEDICINE | Age: 63
End: 2018-10-03

## 2018-10-03 DIAGNOSIS — Z79.01 LONG TERM CURRENT USE OF ANTICOAGULANT THERAPY: ICD-10-CM

## 2018-10-03 LAB — INR BLD: 2.6

## 2018-10-09 NOTE — PROGRESS NOTES
HOME MONITORING REPORT    INR today:   Results for orders placed or performed in visit on 10/03/18   Protime-INR   Result Value Ref Range    INR 2.60        INR Goal: 2.5-3.5    Dosing Plan  As of 10/3/2018    TTR:   --   Full warfarin instructions:   2.5 mg every day              PLAN:PATIENT NOTIFIED TO  CONTINUE CURRENT DOSE AND RECHECK IN ONE WEEK.

## 2018-10-15 ENCOUNTER — ANTI-COAG VISIT (OUTPATIENT)
Dept: INTERNAL MEDICINE | Age: 63
End: 2018-10-15

## 2018-10-15 DIAGNOSIS — Z79.01 LONG TERM CURRENT USE OF ANTICOAGULANT THERAPY: ICD-10-CM

## 2018-10-15 LAB — INR BLD: 2.7

## 2018-10-31 RX ORDER — ATORVASTATIN CALCIUM 20 MG/1
TABLET, FILM COATED ORAL
Qty: 90 TABLET | Refills: 3 | Status: SHIPPED | OUTPATIENT
Start: 2018-10-31 | End: 2018-11-06 | Stop reason: SDUPTHER

## 2018-11-06 RX ORDER — ATORVASTATIN CALCIUM 20 MG/1
TABLET, FILM COATED ORAL
Qty: 90 TABLET | Refills: 3 | Status: SHIPPED | OUTPATIENT
Start: 2018-11-06

## 2018-11-14 ENCOUNTER — ANTI-COAG VISIT (OUTPATIENT)
Dept: INTERNAL MEDICINE | Age: 63
End: 2018-11-14

## 2018-11-14 DIAGNOSIS — Z79.01 LONG TERM CURRENT USE OF ANTICOAGULANT THERAPY: ICD-10-CM

## 2018-11-14 LAB — INR BLD: 3

## 2018-11-14 NOTE — PROGRESS NOTES
HOME MONITORING REPORT    INR today:   Results for orders placed or performed in visit on 11/14/18   Protime-INR   Result Value Ref Range    INR 3.00        INR Goal: 2.5-3.5    Dosing Plan  As of 11/14/2018    TTR:   70.3 % (1.3 y)   Full warfarin instructions:   2.5 mg every day              PLAN:  CONTINUE CURRENT DOSE AND RECHECK IN ONE WEEK.

## 2018-11-21 ENCOUNTER — TELEPHONE (OUTPATIENT)
Dept: INTERNAL MEDICINE | Age: 63
End: 2018-11-21

## 2018-11-21 NOTE — TELEPHONE ENCOUNTER
Discussed case with Dr. Ad Summers and we are going to hold on the Coumadin 2.5 mg for 2 days and then go to half dose for 2 days and then recheck INR and if within normal limits resume regular 2.5 mg Coumadin daily.

## 2018-11-26 ENCOUNTER — TELEPHONE (OUTPATIENT)
Dept: INTERNAL MEDICINE | Age: 63
End: 2018-11-26

## 2018-11-29 ENCOUNTER — ANTI-COAG VISIT (OUTPATIENT)
Dept: INTERNAL MEDICINE | Age: 63
End: 2018-11-29

## 2018-11-29 DIAGNOSIS — Z79.01 LONG TERM CURRENT USE OF ANTICOAGULANT THERAPY: ICD-10-CM

## 2018-11-29 LAB — INR BLD: 2

## 2018-12-05 ENCOUNTER — HOSPITAL ENCOUNTER (OUTPATIENT)
Dept: GENERAL RADIOLOGY | Facility: HOSPITAL | Age: 63
Discharge: HOME | End: 2018-12-05
Admitting: NURSE PRACTITIONER

## 2018-12-05 ENCOUNTER — TELEPHONE (OUTPATIENT)
Dept: INTERNAL MEDICINE | Age: 63
End: 2018-12-05

## 2018-12-05 DIAGNOSIS — J44.9 STAGE 4 VERY SEVERE COPD BY GOLD CLASSIFICATION (HCC): Primary | ICD-10-CM

## 2018-12-05 DIAGNOSIS — J98.8 OTHER SPECIFIED RESPIRATORY DISORDERS: ICD-10-CM

## 2018-12-05 PROCEDURE — 71046 X-RAY EXAM CHEST 2 VIEWS: CPT

## 2018-12-05 RX ORDER — DESONIDE 0.5 MG/G
CREAM TOPICAL 2 TIMES DAILY
Qty: 60 G | Refills: 2 | Status: SHIPPED | OUTPATIENT
Start: 2018-12-05

## 2018-12-05 RX ORDER — DESONIDE 0.5 MG/G
CREAM TOPICAL 2 TIMES DAILY
Qty: 1 TUBE | Refills: 2 | Status: SHIPPED | OUTPATIENT
Start: 2018-12-05 | End: 2018-12-05 | Stop reason: SDUPTHER

## 2018-12-09 PROBLEM — G47.33 OBSTRUCTIVE SLEEP APNEA: Status: ACTIVE | Noted: 2018-12-09

## 2018-12-09 PROBLEM — E66.01 CLASS 3 SEVERE OBESITY WITH SERIOUS COMORBIDITY AND BODY MASS INDEX (BMI) OF 40.0 TO 44.9 IN ADULT (HCC): Status: ACTIVE | Noted: 2018-12-09

## 2018-12-09 PROBLEM — J98.6 DIAPHRAGM DYSFUNCTION: Status: ACTIVE | Noted: 2018-12-09

## 2018-12-09 PROBLEM — J98.4 RESTRICTIVE LUNG DISEASE: Status: ACTIVE | Noted: 2018-12-09

## 2018-12-09 PROBLEM — J98.11 ATELECTASIS, LEFT: Status: ACTIVE | Noted: 2018-12-09

## 2018-12-09 PROBLEM — J45.40 MODERATE PERSISTENT ASTHMA WITHOUT COMPLICATION: Status: ACTIVE | Noted: 2018-12-09

## 2018-12-10 RX ORDER — METOPROLOL TARTRATE 75 MG/1
75 TABLET, FILM COATED ORAL 2 TIMES DAILY
COMMUNITY

## 2018-12-10 RX ORDER — LEVALBUTEROL INHALATION SOLUTION 1.25 MG/3ML
3 SOLUTION RESPIRATORY (INHALATION) 3 TIMES DAILY PRN
COMMUNITY

## 2018-12-10 RX ORDER — POTASSIUM CHLORIDE 750 MG/1
10 TABLET, FILM COATED, EXTENDED RELEASE ORAL 2 TIMES DAILY
COMMUNITY

## 2018-12-12 ENCOUNTER — ANTI-COAG VISIT (OUTPATIENT)
Dept: INTERNAL MEDICINE | Age: 63
End: 2018-12-12

## 2018-12-12 DIAGNOSIS — Z79.01 LONG TERM CURRENT USE OF ANTICOAGULANT THERAPY: ICD-10-CM

## 2018-12-13 LAB — INR BLD: 2.6

## 2018-12-19 ENCOUNTER — ANTI-COAG VISIT (OUTPATIENT)
Dept: INTERNAL MEDICINE | Age: 63
End: 2018-12-19

## 2018-12-19 DIAGNOSIS — Z79.01 LONG TERM CURRENT USE OF ANTICOAGULANT THERAPY: ICD-10-CM

## 2018-12-19 LAB — INR BLD: 4

## 2018-12-21 RX ORDER — WARFARIN SODIUM 2.5 MG/1
TABLET ORAL
Qty: 90 TABLET | Refills: 5 | Status: SHIPPED | OUTPATIENT
Start: 2018-12-21

## 2018-12-24 NOTE — PROGRESS NOTES
GLORIA Rios  [facility]   Respiratory Disease Clinic  [address] [place] [address]  Phone: [phone]  Fax: [phone]     Pranay Gutierrez is a 63 y.o. male.   CC:   Chief Complaint   Patient presents with   • Shortness of Breath        HPI: Mr. Gutierrez is coming in for follow-up of his asthma and restrictive lung disease.  He also suffers from hypertropic cardiomyopathy.  He manages his asthma with twice a day Asmanex and as needed Xopenex.  He is on a multitude of cardiac medications for his underlying heart disease.  He also has sleep apnea which he manages with the BiPAP and is benefiting from that.  He reports the only issue he has had over the last couple of months is pneumonia and dizziness.  He indicates he was treated with 2 courses of antibiotics per nursing care clinic in [facility] for pneumonia with resolution of his symptoms.  Also been in the emergency room twice for vertigo to the degree that he was vomiting and could not keep anything down.  He prescribed him nausea medicine and meclizine with improvement in his symptoms.  Over he has ran out of that prescription and has since been taking over-the-counter motion sickness tablets twice a day routinely with moderate improvement in his symptoms.  Still intermittently vomits.      The following portions of the patient's history were reviewed and updated as appropriate: allergies, current medications, past family history, past medical history, past social history, past surgical history and problem list.    Past Medical History:   Diagnosis Date   • Atelectasis, left 12/9/2018   • Cerebrovascular disease, acute    • Chest pain    • Chronic atrial fibrillation (CMS/[facility])      ablation X 3; followed by Dr. Perez chronically anticoagulated with Coumadin   • Class 3 severe obesity with serious comorbidity and body mass index (BMI) of 40.0 to 44.9 in adult (CMS/HCC) 12/9/2018   • COPD (chronic obstructive pulmonary disease)  (CMS/[facility])    • Diaphragm dysfunction 12/9/2018   • Glaucoma    • Hypertrophic cardiomyopathy (CMS/[facility])    • Intermittent palpitations    • Mitral valve replaced    • Mixed hyperlipidemia    • Moderate persistent asthma without complication 12/9/2018   • Obstructive sleep apnea 12/9/2018   • Obstructive sleep apnea, adult    • Pacemaker    • Restrictive lung disease 12/9/2018   • Stroke (CMS/[facility])     x2       Family History   Problem Relation Age of Onset   • Alzheimer's disease Mother    • Diabetes Father    • Stroke Father        Social History     Socioeconomic History   • Marital status:      Spouse name: Not on file   • Number of children: Not on file   • Years of education: Not on file   • Highest education level: Not on file   Social Needs   • Financial resource strain: Not on file   • Food insecurity - worry: Not on file   • Food insecurity - inability: Not on file   • Transportation needs - medical: Not on file   • Transportation needs - non-medical: Not on file   Occupational History   • Not on file   Tobacco Use   • Smoking status: Never Smoker   • Smokeless tobacco: Never Used   Substance and Sexual Activity   • Alcohol use: No   • Drug use: No   • Sexual activity: Defer   Other Topics Concern   • Not on file   Social History Narrative   • Not on file       Review of Systems   Constitutional: Positive for fever. Negative for activity change, chills and fatigue.   HENT: Negative for congestion, postnasal drip, rhinorrhea, sinus pressure, sore throat and trouble swallowing.    Eyes: Negative for blurred vision, double vision and pain.   Respiratory: Positive for cough and shortness of breath. Negative for chest tightness and wheezing.    Cardiovascular: Positive for leg swelling. Negative for chest pain and palpitations.   Gastrointestinal: Positive for nausea and vomiting. Negative for abdominal distention, constipation and diarrhea.   Endocrine: Negative for polydipsia, polyphagia and polyuria.    Genitourinary: Negative for dysuria, frequency and urgency.   Musculoskeletal: Negative for arthralgias, back pain, gait problem and joint swelling.   Skin: Negative for color change, dry skin, rash and skin lesions.   Allergic/Immunologic: Negative for environmental allergies, food allergies and immunocompromised state.   Neurological: Positive for dizziness. Negative for seizures, speech difficulty, weakness, light-headedness, memory problem and confusion.   Hematological: Negative for adenopathy. Does not bruise/bleed easily.   Psychiatric/Behavioral: Negative for sleep disturbance, negative for hyperactivity and depressed mood. The patient is not nervous/anxious.        .vs    Physical Exam   Constitutional: He is oriented to person, place, and time. He appears well-developed and well-nourished. No distress.   HENT:   Head: Normocephalic and atraumatic.   Right Ear: External ear normal.   Left Ear: External ear normal.   Nose: Nose normal.   Mouth/Throat: Oropharynx is clear and moist. No oropharyngeal exudate.   Eyes: Conjunctivae and EOM are normal. Pupils are equal, round, and reactive to light. Right eye exhibits no discharge. Left eye exhibits no discharge.   Neck: Normal range of motion. Neck supple. No JVD present.   Cardiovascular: Normal rate. An irregularly irregular rhythm present.   No murmur heard.  Pulmonary/Chest: Effort normal and breath sounds normal. No respiratory distress. He has no wheezes.   Abdominal: Soft. Bowel sounds are normal. He exhibits no distension. There is no tenderness.   Musculoskeletal: Normal range of motion. He exhibits no edema or deformity.   Neurological: He is alert and oriented to person, place, and time. He displays normal reflexes. No cranial nerve deficit. Coordination normal.   Skin: Skin is warm and dry. No rash noted. He is not diaphoretic. No erythema.   Psychiatric: He has a normal mood and affect. His behavior is normal. Thought content normal.   Nursing  note and vitals reviewed.      Pulmonary Functions Testing Results:    No results found for: FEV1, FVC, OYR6UGW, TLC, DLCO  No PFTs for this visit    CXR: My interpretation of his most recent chest x-ray done at [facility] on 12-5-18 reflects cardiomegaly, prosthetic valve in place, prominent pulmonary arteries, chronic left diaphragm elevation with associating atelectasis            Pranay was seen today for shortness of breath.    Diagnoses and all orders for this visit:    Moderate persistent asthma without complication    Chronic atrial fibrillation (CMS/HCC)    Hypertrophic cardiomyopathy (CMS/HCC)    Atelectasis, left    Diaphragm dysfunction    Obstructive sleep apnea  -     Ambulatory Referral to Neurology    Restrictive lung disease    Class 3 severe obesity due to excess calories with serious comorbidity and body mass index (BMI) of 40.0 to 44.9 in adult (CMS/HCC)    Dizziness  -     Ambulatory Referral to Neurology      Patient's Body mass index is 40.73 kg/m². BMI is above normal parameters. Recommendations include: educational material.    He appears to be doing very well from a pulmonary standpoint.  He will continue his bronchodilators since he is benefiting from those.  We will refer him to neurology for his persistent vertigo and associating vomiting.  He also no longer has a primary care provider and needs someone to manage his underlying sleep apnea.  We will reassess him in 6 months with spirometry or sooner if he has any worsening issues.    Niya Dorantes, GLORIA  12/26/2018  12:05 PM    Return in about 6 months (around 6/26/2019) for FVL with diffusion.

## 2018-12-26 ENCOUNTER — OFFICE VISIT (OUTPATIENT)
Dept: PULMONOLOGY | Facility: CLINIC | Age: 63
End: 2018-12-26

## 2018-12-26 VITALS
HEIGHT: 71 IN | OXYGEN SATURATION: 95 % | BODY MASS INDEX: 40.88 KG/M2 | SYSTOLIC BLOOD PRESSURE: 130 MMHG | HEART RATE: 76 BPM | RESPIRATION RATE: 16 BRPM | DIASTOLIC BLOOD PRESSURE: 80 MMHG | WEIGHT: 292 LBS

## 2018-12-26 DIAGNOSIS — E66.01 CLASS 3 SEVERE OBESITY DUE TO EXCESS CALORIES WITH SERIOUS COMORBIDITY AND BODY MASS INDEX (BMI) OF 40.0 TO 44.9 IN ADULT (HCC): ICD-10-CM

## 2018-12-26 DIAGNOSIS — I42.2 HYPERTROPHIC CARDIOMYOPATHY (HCC): ICD-10-CM

## 2018-12-26 DIAGNOSIS — R42 DIZZINESS: ICD-10-CM

## 2018-12-26 DIAGNOSIS — I48.20 CHRONIC ATRIAL FIBRILLATION (HCC): ICD-10-CM

## 2018-12-26 DIAGNOSIS — J98.11 ATELECTASIS, LEFT: ICD-10-CM

## 2018-12-26 DIAGNOSIS — G47.33 OBSTRUCTIVE SLEEP APNEA: ICD-10-CM

## 2018-12-26 DIAGNOSIS — J98.6 DIAPHRAGM DYSFUNCTION: ICD-10-CM

## 2018-12-26 DIAGNOSIS — J45.40 MODERATE PERSISTENT ASTHMA WITHOUT COMPLICATION: Primary | ICD-10-CM

## 2018-12-26 DIAGNOSIS — J98.4 RESTRICTIVE LUNG DISEASE: ICD-10-CM

## 2018-12-26 PROCEDURE — 99214 OFFICE O/P EST MOD 30 MIN: CPT | Performed by: NURSE PRACTITIONER

## 2018-12-26 NOTE — PATIENT INSTRUCTIONS

## 2018-12-27 ENCOUNTER — ANTI-COAG VISIT (OUTPATIENT)
Dept: INTERNAL MEDICINE | Age: 63
End: 2018-12-27

## 2018-12-27 DIAGNOSIS — Z79.01 LONG TERM CURRENT USE OF ANTICOAGULANT THERAPY: ICD-10-CM

## 2018-12-27 LAB — INR BLD: 2.4

## 2019-01-29 ENCOUNTER — ANTI-COAG VISIT (OUTPATIENT)
Dept: INTERNAL MEDICINE | Age: 64
End: 2019-01-29

## 2019-01-29 DIAGNOSIS — Z79.01 LONG TERM CURRENT USE OF ANTICOAGULANT THERAPY: ICD-10-CM

## 2019-02-01 ENCOUNTER — TELEPHONE (OUTPATIENT)
Dept: INTERNAL MEDICINE | Age: 64
End: 2019-02-01

## 2019-02-13 ENCOUNTER — TELEPHONE (OUTPATIENT)
Dept: INTERNAL MEDICINE | Age: 64
End: 2019-02-13

## 2019-03-21 RX ORDER — PROPYLTHIOURACIL 50 MG/1
TABLET ORAL
Qty: 30 TABLET | Refills: 4 | Status: SHIPPED | OUTPATIENT
Start: 2019-03-21 | End: 2019-08-17 | Stop reason: SDUPTHER

## 2019-04-08 NOTE — TELEPHONE ENCOUNTER
[facility] called requesting a refill of the below medication which has been pended for you:     Requested Prescriptions     Pending Prescriptions Disp Refills    metolazone (ZAROXOLYN) 2.5 MG tablet [Pharmacy Med Name: METOLAZONE 2.5 MG TABLET] 30 tablet 1     Sig: TAKE ONE TABLET BY MOUTH DAILY ON MONDAY, WEDNESDAY, AND FRIDAY FOR EXCESS FLUID       Last Appointment Date: 9/28/2018  Next Appointment Date: Visit date not found    Allergies   Allergen Reactions    Amoxicillin Hives    Sulfa Antibiotics     Penicillins Hives

## 2019-04-09 RX ORDER — METOLAZONE 2.5 MG/1
TABLET ORAL
Qty: 30 TABLET | Refills: 1 | Status: SHIPPED | OUTPATIENT
Start: 2019-04-09

## 2019-06-17 NOTE — PROGRESS NOTES
GLORIA Rios  [facility]   Respiratory Disease Clinic  [address] [place] [address]  Phone: [phone]  Fax: [phone]     Pranay Gutierrez is a 63 y.o. male.   CC:   Chief Complaint   Patient presents with   • Shortness of Breath        HPI: Mr. Gutierrez is coming in for a follow-up of his asthma and restrictive lung disease.  His restrictive disease is secondary to his obesity, hypertrophic cardiomyopathy, sleep apnea and diaphragm dysfunction.  He experiences moderate persistent shortness of breath.  It is improved with the use of Asmanex routinely and Xopenex when needed.  He is on a BiPAP device.  He is compliant with this and does benefit from it.  Last office visit he was complaining of dizziness and vomiting.  We referred him to neurology at his request after failure of multiple over-the-counter and emergency room recommendations.  He did not pursue the neurology work-up as his symptoms have been well controlled with daily dosing meclizine per another provider.  Since I saw him last he indicates he was hospitalized right after New Year's for sepsis and food poisoning.  He indicates they found a lung nodule.  They performed CT-guided needle biopsy which was negative for malignancy.  He is due for follow-up imaging of that this month by another provider.  He also reports a rash for approximately 2 weeks.  The only medications that are new to him would be a medication for restless leg.  He discussed with his primary care provider who placed him on Zyrtec and told him he could take it up to 4 times a day.  He told him to avoid Benadryl as it could affect his Coumadin.  Rashes on his arms and legs and is somewhat improved.  He does report within 24 hours of the rash she was outdoors doing yard work and attempted to pull a small tree from a location near their home under the awning.  He is not certain whether he may have gotten into something while doing yard work.  His primary care  provider has made him an appointment with an allergist in [place], Dr. Ramon however they cannot get him in until September.    The following portions of the patient's history were reviewed and updated as appropriate: allergies, current medications, past family history, past medical history, past social history, past surgical history and problem list.    Past Medical History:   Diagnosis Date   • Atelectasis, left 12/9/2018   • Cerebrovascular disease, acute    • Chest pain    • Chronic atrial fibrillation (CMS/[facility])      ablation X 3; followed by Dr. Perez chronically anticoagulated with Coumadin   • Class 3 severe obesity with serious comorbidity and body mass index (BMI) of 40.0 to 44.9 in adult (CMS/[facility]) 12/9/2018   • COPD (chronic obstructive pulmonary disease) (CMS/[facility])    • Diaphragm dysfunction 12/9/2018   • Glaucoma    • Hypertrophic cardiomyopathy (CMS/[facility])    • Intermittent palpitations    • Lung nodule 6/24/2019   • Mitral valve replaced    • Mixed hyperlipidemia    • Moderate persistent asthma without complication 12/9/2018   • Obstructive sleep apnea 12/9/2018   • Obstructive sleep apnea, adult    • Pacemaker    • Rash 6/24/2019    Arms and legs   • Restrictive lung disease 12/9/2018   • Stroke (CMS/[facility])     x2       Family History   Problem Relation Age of Onset   • Alzheimer's disease Mother    • Diabetes Father    • Stroke Father        Social History     Socioeconomic History   • Marital status:      Spouse name: Not on file   • Number of children: Not on file   • Years of education: Not on file   • Highest education level: Not on file   Tobacco Use   • Smoking status: Never Smoker   • Smokeless tobacco: Never Used   Substance and Sexual Activity   • Alcohol use: No   • Drug use: No   • Sexual activity: Defer       Review of Systems   Constitutional: Positive for fatigue. Negative for activity change, chills and fever.   HENT: Negative for congestion, postnasal drip, rhinorrhea, sinus  pressure, sore throat and trouble swallowing.    Eyes: Negative for blurred vision, double vision and pain.   Respiratory: Positive for shortness of breath. Negative for cough, chest tightness and wheezing.    Cardiovascular: Negative for chest pain, palpitations and leg swelling.   Gastrointestinal: Negative for abdominal distention, constipation, diarrhea, nausea and vomiting.   Endocrine: Negative for polydipsia, polyphagia and polyuria.   Genitourinary: Negative for dysuria, frequency and urgency.   Musculoskeletal: Positive for back pain. Negative for arthralgias, gait problem and joint swelling.   Skin: Positive for rash. Negative for color change, dry skin and skin lesions.   Allergic/Immunologic: Negative for environmental allergies, food allergies and immunocompromised state.   Neurological: Positive for numbness. Negative for dizziness, seizures, speech difficulty, weakness, light-headedness, memory problem and confusion.   Hematological: Negative for adenopathy. Does not bruise/bleed easily.   Psychiatric/Behavioral: Negative for sleep disturbance, negative for hyperactivity and depressed mood. The patient is not nervous/anxious.        .vs    Physical Exam   Constitutional: He is oriented to person, place, and time. He appears well-developed and well-nourished. He appears distressed.   HENT:   Head: Normocephalic and atraumatic.   Right Ear: External ear normal.   Left Ear: External ear normal.   Nose: Nose normal.   Mouth/Throat: Oropharynx is clear and moist. No oropharyngeal exudate.   Eyes: Conjunctivae and EOM are normal. Pupils are equal, round, and reactive to light. Right eye exhibits no discharge. Left eye exhibits no discharge.   Neck: Normal range of motion. Neck supple. No JVD present.   Cardiovascular: An irregularly irregular rhythm present. Tachycardia present.   Murmur heard.  Pulmonary/Chest: He is in respiratory distress. He has wheezes. He has rales.   Abdominal: Soft. Bowel sounds are  normal. He exhibits no distension.   Musculoskeletal: Normal range of motion. He exhibits edema. He exhibits no deformity.   Neurological: He is alert and oriented to person, place, and time. He displays normal reflexes. No cranial nerve deficit. Coordination normal.   Skin:   Multiple skin lesions on arms and lower extremities, mostly scabbed over, no obvious drainage, no whelps or hives noted   Psychiatric: He has a normal mood and affect. His behavior is normal.   Nursing note and vitals reviewed.      Pulmonary Functions Testing Results:    FEV1   Date Value Ref Range Status   06/24/2019 42% liters Final     FVC   Date Value Ref Range Status   06/24/2019 50% liters Final     FEV1/FVC   Date Value Ref Range Status   06/24/2019 67.21% % Final     DLCO   Date Value Ref Range Status   06/24/2019 68% ml/mmHg sec Final     My interpretation of the PFTs shows severe obstructive disease with reduced mid flows, stable compared to previous, moderate diffusion impairment when corrected for alveolar volume is normal    CXR: No imaging for this visit        Problem List Items Addressed This Visit        Cardiovascular and Mediastinum    Hypertrophic cardiomyopathy (CMS/HCC)    Chronic atrial fibrillation (CMS/HCC)    Overview      ablation X 3; followed by Dr. Perez chronically anticoagulated with Coumadin            Respiratory    Moderate persistent asthma without complication - Primary    Relevant Medications    Mometasone Furoate (ASMANEX HFA) 200 MCG/ACT aerosol    Other Relevant Orders    Pulmonary Function Test (Completed)    Obstructive sleep apnea    Diaphragm dysfunction    Atelectasis, left    Relevant Medications    Mometasone Furoate (ASMANEX HFA) 200 MCG/ACT aerosol    Lung nodule    Overview     CT-guided needle biopsy negative in January 2019, follow-up CT due July 2019 per another provider            Digestive    Class 3 severe obesity with serious comorbidity and body mass index (BMI) of 40.0 to 44.9 in  adult (CMS/HCC)       Nervous and Auditory    Acute bilateral low back pain with left-sided sciatica       Musculoskeletal and Integument    Rash    Overview     Arms and legs            Other    Dizziness        Patient's Body mass index is 39.41 kg/m². BMI is above normal parameters. Recommendations include: educational material.      Assessment/Plan         Follow-up imaging for the lung nodule was already being arranged by an outlying facility.  CT-guided needle biopsy negative for malignancy.  He is stable from an asthma and breathing standpoint.  He will continue the Asmanex.  He will continue his BiPAP with sleep since he is benefiting from this.  Will defer management of the rash to his primary care provider until he can get in with the allergist.  We did discuss he could add an over-the-counter H2 blocker like Pepcid or Tagamet that may help control his symptoms in the interim.  Will defer prescribing meclizine for the dizziness to his primary care provider as well.  Reassessment in 6 months or sooner if needed    Niya Dorantes, GLORIA  6/24/2019  12:04 PM    Return in about 6 months (around 12/24/2019).

## 2019-06-24 ENCOUNTER — PROCEDURE VISIT (OUTPATIENT)
Dept: PULMONOLOGY | Facility: CLINIC | Age: 64
End: 2019-06-24

## 2019-06-24 ENCOUNTER — OFFICE VISIT (OUTPATIENT)
Dept: PULMONOLOGY | Facility: CLINIC | Age: 64
End: 2019-06-24

## 2019-06-24 VITALS
SYSTOLIC BLOOD PRESSURE: 120 MMHG | HEIGHT: 71 IN | DIASTOLIC BLOOD PRESSURE: 70 MMHG | BODY MASS INDEX: 39.56 KG/M2 | HEART RATE: 74 BPM | WEIGHT: 282.6 LBS | OXYGEN SATURATION: 98 %

## 2019-06-24 DIAGNOSIS — R21 RASH: ICD-10-CM

## 2019-06-24 DIAGNOSIS — I48.20 CHRONIC ATRIAL FIBRILLATION (HCC): ICD-10-CM

## 2019-06-24 DIAGNOSIS — I42.2 HYPERTROPHIC CARDIOMYOPATHY (HCC): ICD-10-CM

## 2019-06-24 DIAGNOSIS — J45.40 MODERATE PERSISTENT ASTHMA WITHOUT COMPLICATION: Primary | ICD-10-CM

## 2019-06-24 DIAGNOSIS — R91.1 LUNG NODULE: ICD-10-CM

## 2019-06-24 DIAGNOSIS — M54.42 ACUTE BILATERAL LOW BACK PAIN WITH LEFT-SIDED SCIATICA: ICD-10-CM

## 2019-06-24 DIAGNOSIS — G47.33 OBSTRUCTIVE SLEEP APNEA: ICD-10-CM

## 2019-06-24 DIAGNOSIS — E66.01 CLASS 3 SEVERE OBESITY DUE TO EXCESS CALORIES WITH SERIOUS COMORBIDITY AND BODY MASS INDEX (BMI) OF 40.0 TO 44.9 IN ADULT (HCC): ICD-10-CM

## 2019-06-24 DIAGNOSIS — J98.6 DIAPHRAGM DYSFUNCTION: ICD-10-CM

## 2019-06-24 DIAGNOSIS — J98.11 ATELECTASIS, LEFT: ICD-10-CM

## 2019-06-24 DIAGNOSIS — R42 DIZZINESS: ICD-10-CM

## 2019-06-24 LAB
DIFF CAP.CO: NORMAL ML/MMHG SEC
FEV1/FVC: NORMAL %
FEV1: NORMAL LITERS
FVC VOL RESPIRATORY: NORMAL LITERS

## 2019-06-24 PROCEDURE — 99214 OFFICE O/P EST MOD 30 MIN: CPT | Performed by: NURSE PRACTITIONER

## 2019-06-24 PROCEDURE — 94010 BREATHING CAPACITY TEST: CPT | Performed by: NURSE PRACTITIONER

## 2019-06-24 PROCEDURE — 94729 DIFFUSING CAPACITY: CPT | Performed by: NURSE PRACTITIONER

## 2019-06-24 NOTE — PATIENT INSTRUCTIONS
TAKE PEPCID OR TAGAMET FOR THE RASH TO BLOCK H2 HISTAMINES WITH YOUR ZYRTEC. 1-2 TIMES DAILY. TAKE THIS SEPARATE FROM YOUR MEDICATIONS BY 1 HOUR    BMI for Adults  Body mass index (BMI) is a number that is calculated from a person's weight and height. In most adults, the number is used to find how much of an adult's weight is made up of fat. BMI is not as accurate as a direct measure of body fat.  How is BMI calculated?  BMI is calculated by dividing weight in kilograms by height in meters squared. It can also be calculated by dividing weight in pounds by height in inches squared, then multiplying the resulting number by 703. Charts are available to help you find your BMI quickly and easily without doing this calculation.  How is BMI interpreted?  Health care professionals use BMI charts to identify whether an adult is underweight, at a normal weight, or overweight based on the following guidelines:  · Underweight: BMI less than 18.5.  · Normal weight: BMI between 18.5 and 24.9.  · Overweight: BMI between 25 and 29.9.  · Obese: BMI of 30 and above.    BMI is usually interpreted the same for males and females.  Weight includes both fat and muscle, so someone with a muscular build, such as an athlete, may have a BMI that is higher than 24.9. In cases like these, BMI may not accurately depict body fat. To determine if excess body fat is the cause of a BMI of 25 or higher, further assessments may need to be done by a health care provider.  Why is BMI a useful tool?  BMI is used to identify a possible weight problem that may be related to a medical problem or may increase the risk for medical problems. BMI can also be used to promote changes to reach a healthy weight.  This information is not intended to replace advice given to you by your health care provider. Make sure you discuss any questions you have with your health care provider.  Document Released: 08/29/2005 Document Revised: 04/27/2017 Document Reviewed:  05/15/2015  Elsevier Interactive Patient Education © 2018 [employer]

## 2019-08-20 RX ORDER — PROPYLTHIOURACIL 50 MG/1
TABLET ORAL
Qty: 30 TABLET | Refills: 3 | Status: SHIPPED | OUTPATIENT
Start: 2019-08-20

## 2019-09-05 RX ORDER — FUROSEMIDE 40 MG/1
TABLET ORAL
Qty: 180 TABLET | Refills: 2 | Status: SHIPPED | OUTPATIENT
Start: 2019-09-05

## 2019-09-05 NOTE — TELEPHONE ENCOUNTER
Jeanie Multani called requesting a refill of the below medication which has been pended for you:     Requested Prescriptions     Pending Prescriptions Disp Refills    furosemide (LASIX) 40 MG tablet [Pharmacy Med Name: FUROSEMIDE 40 MG TABLET] 180 tablet 2     Sig: TAKE ONE TABLET BY MOUTH TWICE A DAY       Last Appointment Date: 9/28/2018  Next Appointment Date: Visit date not found    Allergies   Allergen Reactions    Amoxicillin Hives    Sulfa Antibiotics     Penicillins Hives

## 2019-12-12 ENCOUNTER — HOSPITAL ENCOUNTER (OUTPATIENT)
Facility: HOSPITAL | Age: 64
End: 2020-01-13
Attending: INTERNAL MEDICINE | Admitting: INTERNAL MEDICINE

## 2019-12-12 ENCOUNTER — APPOINTMENT (OUTPATIENT)
Dept: GENERAL RADIOLOGY | Facility: HOSPITAL | Age: 64
End: 2019-12-12

## 2019-12-12 LAB
INR PPP: 2.03 (ref 0.91–1.09)
PROTHROMBIN TIME: 23.7 SECONDS (ref 11.9–14.6)

## 2019-12-12 PROCEDURE — 85610 PROTHROMBIN TIME: CPT | Performed by: INTERNAL MEDICINE

## 2019-12-12 PROCEDURE — 71045 X-RAY EXAM CHEST 1 VIEW: CPT

## 2019-12-12 PROCEDURE — 25010000002 HEPARIN (PORCINE) PER 1000 UNITS: Performed by: INTERNAL MEDICINE

## 2019-12-12 RX ORDER — PANTOPRAZOLE SODIUM 40 MG/1
40 TABLET, DELAYED RELEASE ORAL
Status: DISCONTINUED | OUTPATIENT
Start: 2019-12-13 | End: 2020-01-13 | Stop reason: ALTCHOICE

## 2019-12-12 RX ORDER — DEXTROSE MONOHYDRATE 25 G/50ML
25 INJECTION, SOLUTION INTRAVENOUS
Status: DISCONTINUED | OUTPATIENT
Start: 2019-12-12 | End: 2020-01-13 | Stop reason: ALTCHOICE

## 2019-12-12 RX ORDER — ECHINACEA PURPUREA EXTRACT 125 MG
1 TABLET ORAL
Status: DISCONTINUED | OUTPATIENT
Start: 2019-12-12 | End: 2020-01-13 | Stop reason: ALTCHOICE

## 2019-12-12 RX ORDER — FLECAINIDE ACETATE 50 MG/1
25 TABLET ORAL EVERY 12 HOURS SCHEDULED
Status: DISCONTINUED | OUTPATIENT
Start: 2019-12-12 | End: 2020-01-13 | Stop reason: ALTCHOICE

## 2019-12-12 RX ORDER — ROPINIROLE 0.25 MG/1
0.25 TABLET, FILM COATED ORAL 3 TIMES DAILY
Status: DISCONTINUED | OUTPATIENT
Start: 2019-12-12 | End: 2020-01-13 | Stop reason: ALTCHOICE

## 2019-12-12 RX ORDER — WARFARIN SODIUM 2.5 MG/1
2.5 TABLET ORAL
Status: DISCONTINUED | OUTPATIENT
Start: 2019-12-12 | End: 2019-12-15 | Stop reason: SDUPTHER

## 2019-12-12 RX ORDER — HEPARIN SODIUM 10000 [USP'U]/100ML
750 INJECTION, SOLUTION INTRAVENOUS
Status: DISCONTINUED | OUTPATIENT
Start: 2019-12-12 | End: 2019-12-13

## 2019-12-12 RX ORDER — FOLIC ACID 1 MG/1
1 TABLET ORAL DAILY
Status: DISCONTINUED | OUTPATIENT
Start: 2019-12-13 | End: 2020-01-13 | Stop reason: ALTCHOICE

## 2019-12-12 RX ORDER — FOLIC ACID/VIT B COMPLEX AND C 0.8 MG
1 TABLET ORAL DAILY
Status: DISCONTINUED | OUTPATIENT
Start: 2019-12-13 | End: 2020-01-13 | Stop reason: ALTCHOICE

## 2019-12-12 RX ORDER — ACETAMINOPHEN 500 MG
500 TABLET ORAL EVERY 6 HOURS PRN
Status: DISCONTINUED | OUTPATIENT
Start: 2019-12-12 | End: 2020-01-12 | Stop reason: ALTCHOICE

## 2019-12-12 RX ORDER — LACTULOSE 20 G/30ML
20 SOLUTION ORAL 3 TIMES DAILY
Status: DISCONTINUED | OUTPATIENT
Start: 2019-12-12 | End: 2019-12-24

## 2019-12-12 RX ORDER — LATANOPROST 50 UG/ML
1 SOLUTION/ DROPS OPHTHALMIC NIGHTLY
Status: DISCONTINUED | OUTPATIENT
Start: 2019-12-12 | End: 2020-01-13 | Stop reason: ALTCHOICE

## 2019-12-12 RX ORDER — LANOLIN ALCOHOL/MO/W.PET/CERES
6 CREAM (GRAM) TOPICAL NIGHTLY
Status: DISCONTINUED | OUTPATIENT
Start: 2019-12-12 | End: 2020-01-13 | Stop reason: ALTCHOICE

## 2019-12-12 RX ORDER — NICOTINE POLACRILEX 4 MG
15 LOZENGE BUCCAL
Status: DISCONTINUED | OUTPATIENT
Start: 2019-12-12 | End: 2020-01-13 | Stop reason: ALTCHOICE

## 2019-12-12 RX ORDER — CALCITRIOL 0.25 UG/1
0.25 CAPSULE, LIQUID FILLED ORAL DAILY
Status: DISCONTINUED | OUTPATIENT
Start: 2019-12-13 | End: 2019-12-27

## 2019-12-13 LAB
25(OH)D3 SERPL-MCNC: 22 NG/ML (ref 30–100)
ALBUMIN SERPL-MCNC: 4.2 G/DL (ref 3.5–5.2)
ALBUMIN/GLOB SERPL: 1.6 G/DL
ALP SERPL-CCNC: 87 U/L (ref 39–117)
ALT SERPL W P-5'-P-CCNC: 17 U/L (ref 1–41)
ANION GAP SERPL CALCULATED.3IONS-SCNC: 14 MMOL/L (ref 5–15)
APTT PPP: 94.2 SECONDS (ref 24.1–35)
AST SERPL-CCNC: 42 U/L (ref 1–40)
BASOPHILS # BLD AUTO: 0.07 10*3/MM3 (ref 0–0.2)
BASOPHILS NFR BLD AUTO: 0.8 % (ref 0–1.5)
BILIRUB SERPL-MCNC: 6 MG/DL (ref 0.2–1.2)
BUN BLD-MCNC: 18 MG/DL (ref 8–23)
BUN/CREAT SERPL: 4.1 (ref 7–25)
CALCIUM SPEC-SCNC: 10.1 MG/DL (ref 8.6–10.5)
CHLORIDE SERPL-SCNC: 92 MMOL/L (ref 98–107)
CO2 SERPL-SCNC: 29 MMOL/L (ref 22–29)
CREAT BLD-MCNC: 4.35 MG/DL (ref 0.76–1.27)
DEPRECATED RDW RBC AUTO: 69 FL (ref 37–54)
EOSINOPHIL # BLD AUTO: 0.31 10*3/MM3 (ref 0–0.4)
EOSINOPHIL NFR BLD AUTO: 3.7 % (ref 0.3–6.2)
ERYTHROCYTE [DISTWIDTH] IN BLOOD BY AUTOMATED COUNT: 21.7 % (ref 12.3–15.4)
FERRITIN SERPL-MCNC: 222.6 NG/ML (ref 30–400)
FOLATE SERPL-MCNC: >20 NG/ML (ref 4.78–24.2)
GFR SERPL CREATININE-BSD FRML MDRD: 14 ML/MIN/1.73
GFR SERPL CREATININE-BSD FRML MDRD: ABNORMAL ML/MIN/{1.73_M2}
GLOBULIN UR ELPH-MCNC: 2.6 GM/DL
GLUCOSE BLD-MCNC: 104 MG/DL (ref 65–99)
HBV CORE IGM SERPL QL IA: NORMAL
HBV SURFACE AB SER RIA-ACNC: NORMAL
HBV SURFACE AG SERPL QL IA: NORMAL
HCT VFR BLD AUTO: 21.4 % (ref 37.5–51)
HGB BLD-MCNC: 7.4 G/DL (ref 13–17.7)
IMM GRANULOCYTES # BLD AUTO: 0.07 10*3/MM3 (ref 0–0.05)
IMM GRANULOCYTES NFR BLD AUTO: 0.8 % (ref 0–0.5)
INR PPP: 2.08 (ref 0.91–1.09)
IRON 24H UR-MRATE: 42 MCG/DL (ref 59–158)
IRON SATN MFR SERPL: 18 % (ref 20–50)
LYMPHOCYTES # BLD AUTO: 0.64 10*3/MM3 (ref 0.7–3.1)
LYMPHOCYTES NFR BLD AUTO: 7.7 % (ref 19.6–45.3)
MAGNESIUM SERPL-MCNC: 2.2 MG/DL (ref 1.6–2.4)
MCH RBC QN AUTO: 31.5 PG (ref 26.6–33)
MCHC RBC AUTO-ENTMCNC: 34.6 G/DL (ref 31.5–35.7)
MCV RBC AUTO: 91.1 FL (ref 79–97)
MONOCYTES # BLD AUTO: 1.41 10*3/MM3 (ref 0.1–0.9)
MONOCYTES NFR BLD AUTO: 17 % (ref 5–12)
NEUTROPHILS # BLD AUTO: 5.81 10*3/MM3 (ref 1.7–7)
NEUTROPHILS NFR BLD AUTO: 70 % (ref 42.7–76)
NRBC BLD AUTO-RTO: 0 /100 WBC (ref 0–0.2)
PHOSPHATE SERPL-MCNC: 4.6 MG/DL (ref 2.5–4.5)
PLATELET # BLD AUTO: 98 10*3/MM3 (ref 140–450)
PMV BLD AUTO: 10.4 FL (ref 6–12)
POTASSIUM BLD-SCNC: 3.9 MMOL/L (ref 3.5–5.2)
PREALB SERPL-MCNC: 4.2 MG/DL (ref 20–40)
PROT SERPL-MCNC: 6.8 G/DL (ref 6–8.5)
PROTHROMBIN TIME: 24.1 SECONDS (ref 11.9–14.6)
PTH-INTACT SERPL-MCNC: 79.7 PG/ML (ref 15–65)
RBC # BLD AUTO: 2.35 10*6/MM3 (ref 4.14–5.8)
SODIUM BLD-SCNC: 135 MMOL/L (ref 136–145)
T3FREE SERPL-MCNC: 2.7 PG/ML (ref 2–4.4)
T4 FREE SERPL-MCNC: 1.57 NG/DL (ref 0.93–1.7)
TIBC SERPL-MCNC: 234 MCG/DL (ref 298–536)
TRANSFERRIN SERPL-MCNC: 157 MG/DL (ref 200–360)
TSH SERPL DL<=0.05 MIU/L-ACNC: 1.99 UIU/ML (ref 0.27–4.2)
VIT B12 BLD-MCNC: 1859 PG/ML (ref 211–946)
WBC NRBC COR # BLD: 8.31 10*3/MM3 (ref 3.4–10.8)

## 2019-12-13 PROCEDURE — 97161 PT EVAL LOW COMPLEX 20 MIN: CPT | Performed by: PHYSICAL THERAPIST

## 2019-12-13 PROCEDURE — 86706 HEP B SURFACE ANTIBODY: CPT | Performed by: INTERNAL MEDICINE

## 2019-12-13 PROCEDURE — 25010000002 HEPARIN (PORCINE) PER 1000 UNITS: Performed by: INTERNAL MEDICINE

## 2019-12-13 PROCEDURE — 87340 HEPATITIS B SURFACE AG IA: CPT | Performed by: INTERNAL MEDICINE

## 2019-12-13 PROCEDURE — 83540 ASSAY OF IRON: CPT | Performed by: INTERNAL MEDICINE

## 2019-12-13 PROCEDURE — 92610 EVALUATE SWALLOWING FUNCTION: CPT

## 2019-12-13 PROCEDURE — 80053 COMPREHEN METABOLIC PANEL: CPT | Performed by: INTERNAL MEDICINE

## 2019-12-13 PROCEDURE — 82306 VITAMIN D 25 HYDROXY: CPT | Performed by: INTERNAL MEDICINE

## 2019-12-13 PROCEDURE — 83735 ASSAY OF MAGNESIUM: CPT | Performed by: INTERNAL MEDICINE

## 2019-12-13 PROCEDURE — 84439 ASSAY OF FREE THYROXINE: CPT | Performed by: INTERNAL MEDICINE

## 2019-12-13 PROCEDURE — 84134 ASSAY OF PREALBUMIN: CPT | Performed by: INTERNAL MEDICINE

## 2019-12-13 PROCEDURE — 85025 COMPLETE CBC W/AUTO DIFF WBC: CPT | Performed by: INTERNAL MEDICINE

## 2019-12-13 PROCEDURE — 97166 OT EVAL MOD COMPLEX 45 MIN: CPT

## 2019-12-13 PROCEDURE — 82607 VITAMIN B-12: CPT | Performed by: INTERNAL MEDICINE

## 2019-12-13 PROCEDURE — 82746 ASSAY OF FOLIC ACID SERUM: CPT | Performed by: INTERNAL MEDICINE

## 2019-12-13 PROCEDURE — 84443 ASSAY THYROID STIM HORMONE: CPT | Performed by: INTERNAL MEDICINE

## 2019-12-13 PROCEDURE — 85610 PROTHROMBIN TIME: CPT | Performed by: INTERNAL MEDICINE

## 2019-12-13 PROCEDURE — 83970 ASSAY OF PARATHORMONE: CPT | Performed by: INTERNAL MEDICINE

## 2019-12-13 PROCEDURE — 84100 ASSAY OF PHOSPHORUS: CPT | Performed by: INTERNAL MEDICINE

## 2019-12-13 PROCEDURE — 25010000002 ALBUMIN HUMAN 25% PER 50 ML: Performed by: INTERNAL MEDICINE

## 2019-12-13 PROCEDURE — P9047 ALBUMIN (HUMAN), 25%, 50ML: HCPCS | Performed by: INTERNAL MEDICINE

## 2019-12-13 PROCEDURE — 86705 HEP B CORE ANTIBODY IGM: CPT | Performed by: INTERNAL MEDICINE

## 2019-12-13 PROCEDURE — 84481 FREE ASSAY (FT-3): CPT | Performed by: INTERNAL MEDICINE

## 2019-12-13 PROCEDURE — 84466 ASSAY OF TRANSFERRIN: CPT | Performed by: INTERNAL MEDICINE

## 2019-12-13 PROCEDURE — 97535 SELF CARE MNGMENT TRAINING: CPT

## 2019-12-13 PROCEDURE — 82728 ASSAY OF FERRITIN: CPT | Performed by: INTERNAL MEDICINE

## 2019-12-13 PROCEDURE — 85730 THROMBOPLASTIN TIME PARTIAL: CPT | Performed by: INTERNAL MEDICINE

## 2019-12-13 RX ORDER — ONDANSETRON 2 MG/ML
4 INJECTION INTRAMUSCULAR; INTRAVENOUS EVERY 6 HOURS PRN
Status: DISCONTINUED | OUTPATIENT
Start: 2019-12-13 | End: 2020-01-13 | Stop reason: ALTCHOICE

## 2019-12-13 RX ORDER — ONDANSETRON 4 MG/1
4 TABLET, FILM COATED ORAL EVERY 6 HOURS PRN
Status: DISCONTINUED | OUTPATIENT
Start: 2019-12-13 | End: 2020-01-12 | Stop reason: ALTCHOICE

## 2019-12-13 RX ORDER — PETROLATUM,WHITE/LANOLIN
OINTMENT (GRAM) TOPICAL EVERY 12 HOURS SCHEDULED
Status: DISCONTINUED | OUTPATIENT
Start: 2019-12-13 | End: 2020-01-13 | Stop reason: ALTCHOICE

## 2019-12-13 RX ORDER — HEPARIN SODIUM 1000 [USP'U]/ML
4100 INJECTION, SOLUTION INTRAVENOUS; SUBCUTANEOUS ONCE
Status: DISCONTINUED | OUTPATIENT
Start: 2019-12-13 | End: 2019-12-14

## 2019-12-13 RX ORDER — ALBUMIN (HUMAN) 12.5 G/50ML
12.5 SOLUTION INTRAVENOUS
Status: DISCONTINUED | OUTPATIENT
Start: 2019-12-13 | End: 2020-01-13 | Stop reason: ALTCHOICE

## 2019-12-14 LAB
ABO GROUP BLD: NORMAL
ANISOCYTOSIS BLD QL: ABNORMAL
ANISOCYTOSIS BLD QL: ABNORMAL
APTT PPP: 47.9 SECONDS (ref 24.1–35)
BASOPHILS # BLD MANUAL: 0.13 10*3/MM3 (ref 0–0.2)
BASOPHILS NFR BLD AUTO: 1 % (ref 0–1.5)
BLD GP AB SCN SERPL QL: NEGATIVE
DEPRECATED RDW RBC AUTO: 68.3 FL (ref 37–54)
DEPRECATED RDW RBC AUTO: 69.5 FL (ref 37–54)
ERYTHROCYTE [DISTWIDTH] IN BLOOD BY AUTOMATED COUNT: 21.5 % (ref 12.3–15.4)
ERYTHROCYTE [DISTWIDTH] IN BLOOD BY AUTOMATED COUNT: 21.9 % (ref 12.3–15.4)
HCT VFR BLD AUTO: 20.9 % (ref 37.5–51)
HCT VFR BLD AUTO: 24 % (ref 37.5–51)
HGB BLD-MCNC: 6.9 G/DL (ref 13–17.7)
HGB BLD-MCNC: 8 G/DL (ref 13–17.7)
HYPOCHROMIA BLD QL: ABNORMAL
INR PPP: 2.53 (ref 0.91–1.09)
IRON 24H UR-MRATE: 50 MCG/DL (ref 59–158)
IRON SATN MFR SERPL: 21 % (ref 20–50)
LDH SERPL-CCNC: 215 U/L (ref 135–225)
LYMPHOCYTES # BLD MANUAL: 0.27 10*3/MM3 (ref 0.7–3.1)
LYMPHOCYTES # BLD MANUAL: 0.53 10*3/MM3 (ref 0.7–3.1)
LYMPHOCYTES NFR BLD MANUAL: 2 % (ref 19.6–45.3)
LYMPHOCYTES NFR BLD MANUAL: 4 % (ref 19.6–45.3)
LYMPHOCYTES NFR BLD MANUAL: 4 % (ref 5–12)
LYMPHOCYTES NFR BLD MANUAL: 6.1 % (ref 5–12)
MACROCYTES BLD QL SMEAR: ABNORMAL
MACROCYTES BLD QL SMEAR: ABNORMAL
MCH RBC QN AUTO: 30.4 PG (ref 26.6–33)
MCH RBC QN AUTO: 30.8 PG (ref 26.6–33)
MCHC RBC AUTO-ENTMCNC: 33 G/DL (ref 31.5–35.7)
MCHC RBC AUTO-ENTMCNC: 33.3 G/DL (ref 31.5–35.7)
MCV RBC AUTO: 91.3 FL (ref 79–97)
MCV RBC AUTO: 93.3 FL (ref 79–97)
MONOCYTES # BLD AUTO: 0.53 10*3/MM3 (ref 0.1–0.9)
MONOCYTES # BLD AUTO: 0.82 10*3/MM3 (ref 0.1–0.9)
NEUTROPHILS # BLD AUTO: 12.24 10*3/MM3 (ref 1.7–7)
NEUTROPHILS # BLD AUTO: 12.24 10*3/MM3 (ref 1.7–7)
NEUTROPHILS NFR BLD MANUAL: 90.9 % (ref 42.7–76)
NEUTROPHILS NFR BLD MANUAL: 92 % (ref 42.7–76)
PLATELET # BLD AUTO: 36 10*3/MM3 (ref 140–450)
PLATELET # BLD AUTO: 65 10*3/MM3 (ref 140–450)
PMV BLD AUTO: 10.8 FL (ref 6–12)
PMV BLD AUTO: 11.8 FL (ref 6–12)
POLYCHROMASIA BLD QL SMEAR: ABNORMAL
POLYCHROMASIA BLD QL SMEAR: ABNORMAL
PROTHROMBIN TIME: 28.2 SECONDS (ref 11.9–14.6)
RBC # BLD AUTO: 2.24 10*6/MM3 (ref 4.14–5.8)
RBC # BLD AUTO: 2.63 10*6/MM3 (ref 4.14–5.8)
RH BLD: POSITIVE
SMALL PLATELETS BLD QL SMEAR: ABNORMAL
SMALL PLATELETS BLD QL SMEAR: ABNORMAL
T&S EXPIRATION DATE: NORMAL
TARGETS BLD QL SMEAR: ABNORMAL
TIBC SERPL-MCNC: 241 MCG/DL (ref 298–536)
TOXIC GRANULATION: ABNORMAL
TRANSFERRIN SERPL-MCNC: 162 MG/DL (ref 200–360)
WBC MORPH BLD: NORMAL
WBC NRBC COR # BLD: 13.3 10*3/MM3 (ref 3.4–10.8)
WBC NRBC COR # BLD: 13.46 10*3/MM3 (ref 3.4–10.8)

## 2019-12-14 PROCEDURE — 85610 PROTHROMBIN TIME: CPT | Performed by: INTERNAL MEDICINE

## 2019-12-14 PROCEDURE — 85025 COMPLETE CBC W/AUTO DIFF WBC: CPT | Performed by: INTERNAL MEDICINE

## 2019-12-14 PROCEDURE — 86901 BLOOD TYPING SEROLOGIC RH(D): CPT | Performed by: INTERNAL MEDICINE

## 2019-12-14 PROCEDURE — 85007 BL SMEAR W/DIFF WBC COUNT: CPT | Performed by: INTERNAL MEDICINE

## 2019-12-14 PROCEDURE — 86900 BLOOD TYPING SEROLOGIC ABO: CPT

## 2019-12-14 PROCEDURE — 84466 ASSAY OF TRANSFERRIN: CPT | Performed by: INTERNAL MEDICINE

## 2019-12-14 PROCEDURE — 25010000002 HEPARIN (PORCINE) PER 1000 UNITS: Performed by: INTERNAL MEDICINE

## 2019-12-14 PROCEDURE — 86900 BLOOD TYPING SEROLOGIC ABO: CPT | Performed by: INTERNAL MEDICINE

## 2019-12-14 PROCEDURE — 86850 RBC ANTIBODY SCREEN: CPT | Performed by: INTERNAL MEDICINE

## 2019-12-14 PROCEDURE — P9016 RBC LEUKOCYTES REDUCED: HCPCS

## 2019-12-14 PROCEDURE — 86920 COMPATIBILITY TEST SPIN: CPT

## 2019-12-14 PROCEDURE — 83540 ASSAY OF IRON: CPT | Performed by: INTERNAL MEDICINE

## 2019-12-14 PROCEDURE — 82607 VITAMIN B-12: CPT | Performed by: INTERNAL MEDICINE

## 2019-12-14 PROCEDURE — 83615 LACTATE (LD) (LDH) ENZYME: CPT | Performed by: INTERNAL MEDICINE

## 2019-12-14 PROCEDURE — 85730 THROMBOPLASTIN TIME PARTIAL: CPT | Performed by: INTERNAL MEDICINE

## 2019-12-14 RX ORDER — HEPARIN SODIUM 1000 [USP'U]/ML
2000 INJECTION, SOLUTION INTRAVENOUS; SUBCUTANEOUS AS NEEDED
Status: DISCONTINUED | OUTPATIENT
Start: 2019-12-14 | End: 2020-01-13 | Stop reason: ALTCHOICE

## 2019-12-14 RX ADMIN — HEPARIN SODIUM 2000 UNITS: 1000 INJECTION, SOLUTION INTRAVENOUS; SUBCUTANEOUS at 17:55

## 2019-12-14 RX ADMIN — HEPARIN SODIUM 2000 UNITS: 1000 INJECTION, SOLUTION INTRAVENOUS; SUBCUTANEOUS at 18:00

## 2019-12-15 LAB
ABO + RH BLD: NORMAL
APTT PPP: 39.1 SECONDS (ref 24.1–35)
APTT PPP: 50.3 SECONDS (ref 24.1–35)
BASOPHILS # BLD AUTO: 0.13 10*3/MM3 (ref 0–0.2)
BASOPHILS NFR BLD AUTO: 1 % (ref 0–1.5)
BH BB BLOOD EXPIRATION DATE: NORMAL
BH BB BLOOD TYPE BARCODE: 5100
BH BB DISPENSE STATUS: NORMAL
BH BB PRODUCT CODE: NORMAL
BH BB UNIT NUMBER: NORMAL
CROSSMATCH INTERPRETATION: NORMAL
DEPRECATED RDW RBC AUTO: 71.6 FL (ref 37–54)
EOSINOPHIL # BLD AUTO: 0.43 10*3/MM3 (ref 0–0.4)
EOSINOPHIL NFR BLD AUTO: 3.3 % (ref 0.3–6.2)
ERYTHROCYTE [DISTWIDTH] IN BLOOD BY AUTOMATED COUNT: 22.6 % (ref 12.3–15.4)
FIBRINOGEN PPP-MCNC: 174 MG/DL (ref 240–460)
HCT VFR BLD AUTO: 24.9 % (ref 37.5–51)
HGB BLD-MCNC: 8.3 G/DL (ref 13–17.7)
INR PPP: 3 (ref 0.91–1.09)
LYMPHOCYTES # BLD AUTO: 0.67 10*3/MM3 (ref 0.7–3.1)
LYMPHOCYTES NFR BLD AUTO: 5.1 % (ref 19.6–45.3)
MCH RBC QN AUTO: 30.6 PG (ref 26.6–33)
MCHC RBC AUTO-ENTMCNC: 33.3 G/DL (ref 31.5–35.7)
MCV RBC AUTO: 91.9 FL (ref 79–97)
MONOCYTES # BLD AUTO: 1.68 10*3/MM3 (ref 0.1–0.9)
MONOCYTES NFR BLD AUTO: 12.9 % (ref 5–12)
NEUTROPHILS # BLD AUTO: 10.06 10*3/MM3 (ref 1.7–7)
NEUTROPHILS NFR BLD AUTO: 77.1 % (ref 42.7–76)
PLATELET # BLD AUTO: 52 10*3/MM3 (ref 140–450)
PMV BLD AUTO: 11.9 FL (ref 6–12)
PROTHROMBIN TIME: 32.3 SECONDS (ref 11.9–14.6)
RBC # BLD AUTO: 2.71 10*6/MM3 (ref 4.14–5.8)
UNIT  ABO: NORMAL
UNIT  RH: NORMAL
VIT B12 BLD-MCNC: 1938 PG/ML (ref 211–946)
WBC NRBC COR # BLD: 13.05 10*3/MM3 (ref 3.4–10.8)

## 2019-12-15 PROCEDURE — 97530 THERAPEUTIC ACTIVITIES: CPT

## 2019-12-15 PROCEDURE — 97116 GAIT TRAINING THERAPY: CPT

## 2019-12-15 PROCEDURE — 85025 COMPLETE CBC W/AUTO DIFF WBC: CPT | Performed by: INTERNAL MEDICINE

## 2019-12-15 PROCEDURE — 85610 PROTHROMBIN TIME: CPT | Performed by: INTERNAL MEDICINE

## 2019-12-15 PROCEDURE — 85384 FIBRINOGEN ACTIVITY: CPT | Performed by: INTERNAL MEDICINE

## 2019-12-15 PROCEDURE — 85730 THROMBOPLASTIN TIME PARTIAL: CPT | Performed by: INTERNAL MEDICINE

## 2019-12-15 PROCEDURE — 86022 PLATELET ANTIBODIES: CPT | Performed by: INTERNAL MEDICINE

## 2019-12-15 RX ORDER — WARFARIN SODIUM 2.5 MG/1
2.5 TABLET ORAL
Status: DISCONTINUED | OUTPATIENT
Start: 2019-12-16 | End: 2019-12-16

## 2019-12-16 PROBLEM — R06.02 SHORTNESS OF BREATH: Status: RESOLVED | Noted: 2017-08-17 | Resolved: 2019-12-16

## 2019-12-16 LAB
ANION GAP SERPL CALCULATED.3IONS-SCNC: 16 MMOL/L (ref 5–15)
APTT PPP: 52.1 SECONDS (ref 24.1–35)
BASOPHILS # BLD AUTO: 0.11 10*3/MM3 (ref 0–0.2)
BASOPHILS NFR BLD AUTO: 1.2 % (ref 0–1.5)
BUN BLD-MCNC: 17 MG/DL (ref 8–23)
BUN/CREAT SERPL: 4 (ref 7–25)
CALCIUM SPEC-SCNC: 10.4 MG/DL (ref 8.6–10.5)
CHLORIDE SERPL-SCNC: 96 MMOL/L (ref 98–107)
CO2 SERPL-SCNC: 27 MMOL/L (ref 22–29)
CREAT BLD-MCNC: 4.27 MG/DL (ref 0.76–1.27)
CRP SERPL-MCNC: 3.39 MG/DL (ref 0–0.5)
DEPRECATED RDW RBC AUTO: 72.2 FL (ref 37–54)
EOSINOPHIL # BLD AUTO: 0.59 10*3/MM3 (ref 0–0.4)
EOSINOPHIL NFR BLD AUTO: 6.3 % (ref 0.3–6.2)
ERYTHROCYTE [DISTWIDTH] IN BLOOD BY AUTOMATED COUNT: 22.1 % (ref 12.3–15.4)
ERYTHROCYTE [SEDIMENTATION RATE] IN BLOOD: 6 MM/HR (ref 0–15)
GFR SERPL CREATININE-BSD FRML MDRD: 14 ML/MIN/1.73
GFR SERPL CREATININE-BSD FRML MDRD: ABNORMAL ML/MIN/{1.73_M2}
GLUCOSE BLD-MCNC: 101 MG/DL (ref 65–99)
HCT VFR BLD AUTO: 25 % (ref 37.5–51)
HGB BLD-MCNC: 8.3 G/DL (ref 13–17.7)
INR PPP: 3.76 (ref 0.91–1.09)
LYMPHOCYTES # BLD AUTO: 0.61 10*3/MM3 (ref 0.7–3.1)
LYMPHOCYTES NFR BLD AUTO: 6.5 % (ref 19.6–45.3)
MCH RBC QN AUTO: 30.5 PG (ref 26.6–33)
MCHC RBC AUTO-ENTMCNC: 33.2 G/DL (ref 31.5–35.7)
MCV RBC AUTO: 91.9 FL (ref 79–97)
MONOCYTES # BLD AUTO: 1.56 10*3/MM3 (ref 0.1–0.9)
MONOCYTES NFR BLD AUTO: 16.6 % (ref 5–12)
NEUTROPHILS # BLD AUTO: 6.47 10*3/MM3 (ref 1.7–7)
NEUTROPHILS NFR BLD AUTO: 68.8 % (ref 42.7–76)
PLATELET # BLD AUTO: 54 10*3/MM3 (ref 140–450)
PMV BLD AUTO: 11.2 FL (ref 6–12)
POTASSIUM BLD-SCNC: 3.9 MMOL/L (ref 3.5–5.2)
PROTHROMBIN TIME: 38.6 SECONDS (ref 11.9–14.6)
PTH-INTACT SERPL-MCNC: 63 PG/ML (ref 15–65)
RBC # BLD AUTO: 2.72 10*6/MM3 (ref 4.14–5.8)
SODIUM BLD-SCNC: 139 MMOL/L (ref 136–145)
WBC NRBC COR # BLD: 9.4 10*3/MM3 (ref 3.4–10.8)

## 2019-12-16 PROCEDURE — 93005 ELECTROCARDIOGRAM TRACING: CPT | Performed by: INTERNAL MEDICINE

## 2019-12-16 PROCEDURE — 85730 THROMBOPLASTIN TIME PARTIAL: CPT | Performed by: INTERNAL MEDICINE

## 2019-12-16 PROCEDURE — 25010000002 EPOETIN ALFA PER 1000 UNITS: Performed by: INTERNAL MEDICINE

## 2019-12-16 PROCEDURE — 80048 BASIC METABOLIC PNL TOTAL CA: CPT | Performed by: INTERNAL MEDICINE

## 2019-12-16 PROCEDURE — 96125 COGNITIVE TEST BY HC PRO: CPT

## 2019-12-16 PROCEDURE — 25010000002 ONDANSETRON PER 1 MG: Performed by: INTERNAL MEDICINE

## 2019-12-16 PROCEDURE — 25010000002 GLUCAGON (HUMAN RECOMBINANT) 1 MG RECONSTITUTED SOLUTION: Performed by: INTERNAL MEDICINE

## 2019-12-16 PROCEDURE — 85025 COMPLETE CBC W/AUTO DIFF WBC: CPT | Performed by: INTERNAL MEDICINE

## 2019-12-16 PROCEDURE — 85651 RBC SED RATE NONAUTOMATED: CPT | Performed by: INTERNAL MEDICINE

## 2019-12-16 PROCEDURE — 86140 C-REACTIVE PROTEIN: CPT | Performed by: INTERNAL MEDICINE

## 2019-12-16 PROCEDURE — 85610 PROTHROMBIN TIME: CPT | Performed by: INTERNAL MEDICINE

## 2019-12-16 PROCEDURE — 25010000002 HEPARIN (PORCINE) PER 1000 UNITS: Performed by: INTERNAL MEDICINE

## 2019-12-16 PROCEDURE — 83970 ASSAY OF PARATHORMONE: CPT | Performed by: INTERNAL MEDICINE

## 2019-12-16 RX ORDER — WARFARIN SODIUM 2.5 MG/1
2.5 TABLET ORAL
Status: DISCONTINUED | OUTPATIENT
Start: 2019-12-17 | End: 2019-12-17

## 2019-12-16 RX ORDER — WARFARIN SODIUM 2.5 MG/1
2.5 TABLET ORAL
Status: DISCONTINUED | OUTPATIENT
Start: 2019-12-17 | End: 2019-12-16

## 2019-12-17 LAB
ALBUMIN SERPL-MCNC: 4.3 G/DL (ref 3.5–5.2)
ALBUMIN/GLOB SERPL: 1.5 G/DL
ALP SERPL-CCNC: 98 U/L (ref 39–117)
ALT SERPL W P-5'-P-CCNC: 19 U/L (ref 1–41)
AMMONIA BLD-SCNC: 19 UMOL/L (ref 16–60)
ANION GAP SERPL CALCULATED.3IONS-SCNC: 13 MMOL/L (ref 5–15)
APTT PPP: 55 SECONDS (ref 24.1–35)
AST SERPL-CCNC: 51 U/L (ref 1–40)
BASOPHILS # BLD AUTO: 0.1 10*3/MM3 (ref 0–0.2)
BASOPHILS NFR BLD AUTO: 1 % (ref 0–1.5)
BILIRUB SERPL-MCNC: 6.7 MG/DL (ref 0.2–1.2)
BUN BLD-MCNC: 16 MG/DL (ref 8–23)
BUN/CREAT SERPL: 4 (ref 7–25)
CALCIUM SPEC-SCNC: 10.2 MG/DL (ref 8.6–10.5)
CHLORIDE SERPL-SCNC: 99 MMOL/L (ref 98–107)
CO2 SERPL-SCNC: 26 MMOL/L (ref 22–29)
CREAT BLD-MCNC: 4.03 MG/DL (ref 0.76–1.27)
DEPRECATED RDW RBC AUTO: 73.1 FL (ref 37–54)
EOSINOPHIL # BLD AUTO: 0.47 10*3/MM3 (ref 0–0.4)
EOSINOPHIL NFR BLD AUTO: 4.8 % (ref 0.3–6.2)
ERYTHROCYTE [DISTWIDTH] IN BLOOD BY AUTOMATED COUNT: 22.3 % (ref 12.3–15.4)
GFR SERPL CREATININE-BSD FRML MDRD: 15 ML/MIN/1.73
GLOBULIN UR ELPH-MCNC: 2.9 GM/DL
GLUCOSE BLD-MCNC: 114 MG/DL (ref 65–99)
HCT VFR BLD AUTO: 25 % (ref 37.5–51)
HGB BLD-MCNC: 8.3 G/DL (ref 13–17.7)
INR PPP: 3.55 (ref 0.91–1.09)
IRON 24H UR-MRATE: 36 MCG/DL (ref 59–158)
IRON SATN MFR SERPL: 15 % (ref 20–50)
LYMPHOCYTES # BLD AUTO: 0.64 10*3/MM3 (ref 0.7–3.1)
LYMPHOCYTES NFR BLD AUTO: 6.6 % (ref 19.6–45.3)
MCH RBC QN AUTO: 30.9 PG (ref 26.6–33)
MCHC RBC AUTO-ENTMCNC: 33.2 G/DL (ref 31.5–35.7)
MCV RBC AUTO: 92.9 FL (ref 79–97)
MONOCYTES # BLD AUTO: 1.41 10*3/MM3 (ref 0.1–0.9)
MONOCYTES NFR BLD AUTO: 14.5 % (ref 5–12)
NEUTROPHILS # BLD AUTO: 7.06 10*3/MM3 (ref 1.7–7)
NEUTROPHILS NFR BLD AUTO: 72.4 % (ref 42.7–76)
PF4 HEPARIN CMPLX AB SER-ACNC: 0.3 OD (ref 0–0.4)
PLATELET # BLD AUTO: 46 10*3/MM3 (ref 140–450)
PMV BLD AUTO: 10.3 FL (ref 6–12)
POTASSIUM BLD-SCNC: 3.8 MMOL/L (ref 3.5–5.2)
PROT SERPL-MCNC: 7.2 G/DL (ref 6–8.5)
PROTHROMBIN TIME: 36.9 SECONDS (ref 11.9–14.6)
RBC # BLD AUTO: 2.69 10*6/MM3 (ref 4.14–5.8)
SODIUM BLD-SCNC: 138 MMOL/L (ref 136–145)
TIBC SERPL-MCNC: 234 MCG/DL (ref 298–536)
TRANSFERRIN SERPL-MCNC: 157 MG/DL (ref 200–360)
WBC NRBC COR # BLD: 9.75 10*3/MM3 (ref 3.4–10.8)

## 2019-12-17 PROCEDURE — 93010 ELECTROCARDIOGRAM REPORT: CPT | Performed by: INTERNAL MEDICINE

## 2019-12-17 PROCEDURE — 85025 COMPLETE CBC W/AUTO DIFF WBC: CPT | Performed by: INTERNAL MEDICINE

## 2019-12-17 PROCEDURE — 83540 ASSAY OF IRON: CPT | Performed by: INTERNAL MEDICINE

## 2019-12-17 PROCEDURE — 80053 COMPREHEN METABOLIC PANEL: CPT | Performed by: INTERNAL MEDICINE

## 2019-12-17 PROCEDURE — 85730 THROMBOPLASTIN TIME PARTIAL: CPT | Performed by: INTERNAL MEDICINE

## 2019-12-17 PROCEDURE — 97535 SELF CARE MNGMENT TRAINING: CPT

## 2019-12-17 PROCEDURE — 99232 SBSQ HOSP IP/OBS MODERATE 35: CPT | Performed by: NURSE PRACTITIONER

## 2019-12-17 PROCEDURE — 82140 ASSAY OF AMMONIA: CPT | Performed by: INTERNAL MEDICINE

## 2019-12-17 PROCEDURE — 85610 PROTHROMBIN TIME: CPT | Performed by: INTERNAL MEDICINE

## 2019-12-17 PROCEDURE — 84466 ASSAY OF TRANSFERRIN: CPT | Performed by: INTERNAL MEDICINE

## 2019-12-17 PROCEDURE — 25010000002 IRON SUCROSE PER 1 MG: Performed by: NURSE PRACTITIONER

## 2019-12-17 RX ORDER — WARFARIN SODIUM 2.5 MG/1
2.5 TABLET ORAL
Status: DISCONTINUED | OUTPATIENT
Start: 2019-12-18 | End: 2019-12-18

## 2019-12-17 RX ORDER — MIDODRINE HYDROCHLORIDE 5 MG/1
5 TABLET ORAL EVERY 8 HOURS SCHEDULED
Status: DISCONTINUED | OUTPATIENT
Start: 2019-12-17 | End: 2019-12-17 | Stop reason: SDUPTHER

## 2019-12-17 RX ORDER — MIDODRINE HYDROCHLORIDE 10 MG/1
30 TABLET ORAL EVERY 8 HOURS SCHEDULED
Status: DISCONTINUED | OUTPATIENT
Start: 2019-12-17 | End: 2019-12-18

## 2019-12-18 LAB
APTT PPP: 57.8 SECONDS (ref 24.1–35)
BASOPHILS # BLD AUTO: 0.1 10*3/MM3 (ref 0–0.2)
BASOPHILS NFR BLD AUTO: 1.2 % (ref 0–1.5)
DEPRECATED RDW RBC AUTO: 72.5 FL (ref 37–54)
EOSINOPHIL # BLD AUTO: 0.52 10*3/MM3 (ref 0–0.4)
EOSINOPHIL NFR BLD AUTO: 6.5 % (ref 0.3–6.2)
ERYTHROCYTE [DISTWIDTH] IN BLOOD BY AUTOMATED COUNT: 22.3 % (ref 12.3–15.4)
GLUCOSE BLDC GLUCOMTR-MCNC: 116 MG/DL (ref 70–130)
GLUCOSE BLDC GLUCOMTR-MCNC: 89 MG/DL (ref 70–130)
HCT VFR BLD AUTO: 25.8 % (ref 37.5–51)
HGB BLD-MCNC: 8.4 G/DL (ref 13–17.7)
IMM GRANULOCYTES # BLD AUTO: 0.06 10*3/MM3 (ref 0–0.05)
IMM GRANULOCYTES NFR BLD AUTO: 0.7 % (ref 0–0.5)
INR PPP: 3.99 (ref 0.91–1.09)
LYMPHOCYTES # BLD AUTO: 0.62 10*3/MM3 (ref 0.7–3.1)
LYMPHOCYTES NFR BLD AUTO: 7.7 % (ref 19.6–45.3)
MCH RBC QN AUTO: 30.2 PG (ref 26.6–33)
MCHC RBC AUTO-ENTMCNC: 32.6 G/DL (ref 31.5–35.7)
MCV RBC AUTO: 92.8 FL (ref 79–97)
MONOCYTES # BLD AUTO: 1.29 10*3/MM3 (ref 0.1–0.9)
MONOCYTES NFR BLD AUTO: 16.1 % (ref 5–12)
NEUTROPHILS # BLD AUTO: 5.43 10*3/MM3 (ref 1.7–7)
NEUTROPHILS NFR BLD AUTO: 67.8 % (ref 42.7–76)
NRBC BLD AUTO-RTO: 0.2 /100 WBC (ref 0–0.2)
PLATELET # BLD AUTO: 59 10*3/MM3 (ref 140–450)
PMV BLD AUTO: 11.3 FL (ref 6–12)
PROTHROMBIN TIME: 40.5 SECONDS (ref 11.9–14.6)
RBC # BLD AUTO: 2.78 10*6/MM3 (ref 4.14–5.8)
WBC NRBC COR # BLD: 8.02 10*3/MM3 (ref 3.4–10.8)

## 2019-12-18 PROCEDURE — 97110 THERAPEUTIC EXERCISES: CPT

## 2019-12-18 PROCEDURE — 82962 GLUCOSE BLOOD TEST: CPT

## 2019-12-18 PROCEDURE — 25010000002 IRON SUCROSE PER 1 MG: Performed by: NURSE PRACTITIONER

## 2019-12-18 PROCEDURE — 85025 COMPLETE CBC W/AUTO DIFF WBC: CPT | Performed by: INTERNAL MEDICINE

## 2019-12-18 PROCEDURE — 92507 TX SP LANG VOICE COMM INDIV: CPT | Performed by: SPEECH-LANGUAGE PATHOLOGIST

## 2019-12-18 PROCEDURE — 85610 PROTHROMBIN TIME: CPT | Performed by: INTERNAL MEDICINE

## 2019-12-18 PROCEDURE — 25010000002 HEPARIN (PORCINE) PER 1000 UNITS: Performed by: INTERNAL MEDICINE

## 2019-12-18 PROCEDURE — 97116 GAIT TRAINING THERAPY: CPT

## 2019-12-18 PROCEDURE — 97530 THERAPEUTIC ACTIVITIES: CPT

## 2019-12-18 PROCEDURE — 85730 THROMBOPLASTIN TIME PARTIAL: CPT | Performed by: INTERNAL MEDICINE

## 2019-12-18 RX ORDER — MIDODRINE HYDROCHLORIDE 10 MG/1
10 TABLET ORAL EVERY 8 HOURS SCHEDULED
Status: DISCONTINUED | OUTPATIENT
Start: 2019-12-18 | End: 2020-01-09

## 2019-12-18 RX ORDER — MIDODRINE HYDROCHLORIDE 5 MG/1
5 TABLET ORAL EVERY 8 HOURS SCHEDULED
Status: DISCONTINUED | OUTPATIENT
Start: 2019-12-18 | End: 2019-12-18

## 2019-12-18 RX ORDER — WARFARIN SODIUM 2.5 MG/1
2.5 TABLET ORAL
Status: DISCONTINUED | OUTPATIENT
Start: 2019-12-19 | End: 2019-12-19

## 2019-12-18 RX ADMIN — HEPARIN SODIUM 2000 UNITS: 1000 INJECTION, SOLUTION INTRAVENOUS; SUBCUTANEOUS at 12:20

## 2019-12-18 RX ADMIN — HEPARIN SODIUM 2000 UNITS: 1000 INJECTION, SOLUTION INTRAVENOUS; SUBCUTANEOUS at 12:21

## 2019-12-19 ENCOUNTER — APPOINTMENT (OUTPATIENT)
Dept: GENERAL RADIOLOGY | Facility: HOSPITAL | Age: 64
End: 2019-12-19

## 2019-12-19 LAB
ANION GAP SERPL CALCULATED.3IONS-SCNC: 16 MMOL/L (ref 5–15)
APTT PPP: 58.6 SECONDS (ref 24.1–35)
BASOPHILS # BLD AUTO: 0.11 10*3/MM3 (ref 0–0.2)
BASOPHILS NFR BLD AUTO: 1.4 % (ref 0–1.5)
BUN BLD-MCNC: 14 MG/DL (ref 8–23)
BUN/CREAT SERPL: 3.9 (ref 7–25)
CALCIUM SPEC-SCNC: 10.2 MG/DL (ref 8.6–10.5)
CHLORIDE SERPL-SCNC: 95 MMOL/L (ref 98–107)
CO2 SERPL-SCNC: 26 MMOL/L (ref 22–29)
CREAT BLD-MCNC: 3.62 MG/DL (ref 0.76–1.27)
DEPRECATED RDW RBC AUTO: 73.8 FL (ref 37–54)
EOSINOPHIL # BLD AUTO: 0.38 10*3/MM3 (ref 0–0.4)
EOSINOPHIL NFR BLD AUTO: 4.7 % (ref 0.3–6.2)
ERYTHROCYTE [DISTWIDTH] IN BLOOD BY AUTOMATED COUNT: 22.6 % (ref 12.3–15.4)
GFR SERPL CREATININE-BSD FRML MDRD: 17 ML/MIN/1.73
GLUCOSE BLD-MCNC: 105 MG/DL (ref 65–99)
HCT VFR BLD AUTO: 25.2 % (ref 37.5–51)
HGB BLD-MCNC: 8.3 G/DL (ref 13–17.7)
INR PPP: 3.31 (ref 0.91–1.09)
LYMPHOCYTES # BLD AUTO: 0.52 10*3/MM3 (ref 0.7–3.1)
LYMPHOCYTES NFR BLD AUTO: 6.5 % (ref 19.6–45.3)
MCH RBC QN AUTO: 30.6 PG (ref 26.6–33)
MCHC RBC AUTO-ENTMCNC: 32.9 G/DL (ref 31.5–35.7)
MCV RBC AUTO: 93 FL (ref 79–97)
MONOCYTES # BLD AUTO: 1.21 10*3/MM3 (ref 0.1–0.9)
MONOCYTES NFR BLD AUTO: 15 % (ref 5–12)
NEUTROPHILS # BLD AUTO: 5.79 10*3/MM3 (ref 1.7–7)
NEUTROPHILS NFR BLD AUTO: 71.8 % (ref 42.7–76)
PHOSPHATE SERPL-MCNC: 3.4 MG/DL (ref 2.5–4.5)
PLATELET # BLD AUTO: 41 10*3/MM3 (ref 140–450)
PMV BLD AUTO: 11.4 FL (ref 6–12)
POTASSIUM BLD-SCNC: 3.6 MMOL/L (ref 3.5–5.2)
PROTHROMBIN TIME: 34.9 SECONDS (ref 11.9–14.6)
RBC # BLD AUTO: 2.71 10*6/MM3 (ref 4.14–5.8)
SODIUM BLD-SCNC: 137 MMOL/L (ref 136–145)
WBC NRBC COR # BLD: 8.06 10*3/MM3 (ref 3.4–10.8)

## 2019-12-19 PROCEDURE — 85025 COMPLETE CBC W/AUTO DIFF WBC: CPT | Performed by: INTERNAL MEDICINE

## 2019-12-19 PROCEDURE — 71045 X-RAY EXAM CHEST 1 VIEW: CPT

## 2019-12-19 PROCEDURE — 84100 ASSAY OF PHOSPHORUS: CPT | Performed by: INTERNAL MEDICINE

## 2019-12-19 PROCEDURE — 80048 BASIC METABOLIC PNL TOTAL CA: CPT | Performed by: INTERNAL MEDICINE

## 2019-12-19 PROCEDURE — 85610 PROTHROMBIN TIME: CPT | Performed by: INTERNAL MEDICINE

## 2019-12-19 PROCEDURE — C1751 CATH, INF, PER/CENT/MIDLINE: HCPCS

## 2019-12-19 PROCEDURE — 85730 THROMBOPLASTIN TIME PARTIAL: CPT | Performed by: INTERNAL MEDICINE

## 2019-12-19 PROCEDURE — 25010000002 IRON SUCROSE PER 1 MG: Performed by: NURSE PRACTITIONER

## 2019-12-19 PROCEDURE — 97110 THERAPEUTIC EXERCISES: CPT

## 2019-12-19 RX ORDER — LIDOCAINE HYDROCHLORIDE 10 MG/ML
1 INJECTION, SOLUTION EPIDURAL; INFILTRATION; INTRACAUDAL; PERINEURAL ONCE
Status: COMPLETED | OUTPATIENT
Start: 2019-12-19 | End: 2019-12-19

## 2019-12-19 RX ORDER — WARFARIN SODIUM 2 MG/1
1 TABLET ORAL
Status: DISCONTINUED | OUTPATIENT
Start: 2019-12-19 | End: 2019-12-20

## 2019-12-19 RX ADMIN — LIDOCAINE HYDROCHLORIDE 1 ML: 10 INJECTION, SOLUTION EPIDURAL; INFILTRATION; INTRACAUDAL; PERINEURAL at 13:44

## 2019-12-20 LAB
ANION GAP SERPL CALCULATED.3IONS-SCNC: 15 MMOL/L (ref 5–15)
APTT PPP: 65 SECONDS (ref 24.1–35)
BASOPHILS # BLD AUTO: 0.08 10*3/MM3 (ref 0–0.2)
BASOPHILS NFR BLD AUTO: 1.4 % (ref 0–1.5)
BUN BLD-MCNC: 17 MG/DL (ref 8–23)
BUN/CREAT SERPL: 3.8 (ref 7–25)
CALCIUM SPEC-SCNC: 10.1 MG/DL (ref 8.6–10.5)
CHLORIDE SERPL-SCNC: 98 MMOL/L (ref 98–107)
CO2 SERPL-SCNC: 24 MMOL/L (ref 22–29)
CREAT BLD-MCNC: 4.48 MG/DL (ref 0.76–1.27)
DEPRECATED RDW RBC AUTO: 73 FL (ref 37–54)
EOSINOPHIL # BLD AUTO: 0.32 10*3/MM3 (ref 0–0.4)
EOSINOPHIL NFR BLD AUTO: 5.4 % (ref 0.3–6.2)
ERYTHROCYTE [DISTWIDTH] IN BLOOD BY AUTOMATED COUNT: 22.8 % (ref 12.3–15.4)
GFR SERPL CREATININE-BSD FRML MDRD: 13 ML/MIN/1.73
GFR SERPL CREATININE-BSD FRML MDRD: ABNORMAL ML/MIN/{1.73_M2}
GLUCOSE BLD-MCNC: 126 MG/DL (ref 65–99)
HCT VFR BLD AUTO: 23.3 % (ref 37.5–51)
HGB BLD-MCNC: 7.8 G/DL (ref 13–17.7)
INR PPP: 3.57 (ref 0.91–1.09)
LYMPHOCYTES # BLD AUTO: 0.48 10*3/MM3 (ref 0.7–3.1)
LYMPHOCYTES NFR BLD AUTO: 8.1 % (ref 19.6–45.3)
MCH RBC QN AUTO: 31 PG (ref 26.6–33)
MCHC RBC AUTO-ENTMCNC: 33.5 G/DL (ref 31.5–35.7)
MCV RBC AUTO: 92.5 FL (ref 79–97)
MONOCYTES # BLD AUTO: 1.01 10*3/MM3 (ref 0.1–0.9)
MONOCYTES NFR BLD AUTO: 17.1 % (ref 5–12)
NEUTROPHILS # BLD AUTO: 3.99 10*3/MM3 (ref 1.7–7)
NEUTROPHILS NFR BLD AUTO: 67.3 % (ref 42.7–76)
PLATELET # BLD AUTO: 58 10*3/MM3 (ref 140–450)
PMV BLD AUTO: 11.6 FL (ref 6–12)
POTASSIUM BLD-SCNC: 3.5 MMOL/L (ref 3.5–5.2)
PROTHROMBIN TIME: 37.1 SECONDS (ref 11.9–14.6)
RBC # BLD AUTO: 2.52 10*6/MM3 (ref 4.14–5.8)
SODIUM BLD-SCNC: 137 MMOL/L (ref 136–145)
WBC NRBC COR # BLD: 5.92 10*3/MM3 (ref 3.4–10.8)

## 2019-12-20 PROCEDURE — 25010000002 HEPARIN (PORCINE) PER 1000 UNITS: Performed by: INTERNAL MEDICINE

## 2019-12-20 PROCEDURE — 25010000002 ONDANSETRON PER 1 MG: Performed by: INTERNAL MEDICINE

## 2019-12-20 PROCEDURE — 85025 COMPLETE CBC W/AUTO DIFF WBC: CPT | Performed by: INTERNAL MEDICINE

## 2019-12-20 PROCEDURE — 85610 PROTHROMBIN TIME: CPT | Performed by: INTERNAL MEDICINE

## 2019-12-20 PROCEDURE — 85730 THROMBOPLASTIN TIME PARTIAL: CPT | Performed by: INTERNAL MEDICINE

## 2019-12-20 PROCEDURE — 80048 BASIC METABOLIC PNL TOTAL CA: CPT | Performed by: INTERNAL MEDICINE

## 2019-12-20 RX ORDER — WARFARIN SODIUM 2 MG/1
1 TABLET ORAL
Status: DISCONTINUED | OUTPATIENT
Start: 2019-12-21 | End: 2019-12-24

## 2019-12-21 LAB
APTT PPP: 60.7 SECONDS (ref 24.1–35)
BASOPHILS # BLD AUTO: 0.07 10*3/MM3 (ref 0–0.2)
BASOPHILS NFR BLD AUTO: 0.9 % (ref 0–1.5)
DEPRECATED RDW RBC AUTO: 75.7 FL (ref 37–54)
EOSINOPHIL # BLD AUTO: 0.26 10*3/MM3 (ref 0–0.4)
EOSINOPHIL NFR BLD AUTO: 3.2 % (ref 0.3–6.2)
ERYTHROCYTE [DISTWIDTH] IN BLOOD BY AUTOMATED COUNT: 23 % (ref 12.3–15.4)
HCT VFR BLD AUTO: 24.5 % (ref 37.5–51)
HGB BLD-MCNC: 8 G/DL (ref 13–17.7)
INR PPP: 3.25 (ref 0.91–1.09)
LYMPHOCYTES # BLD AUTO: 0.58 10*3/MM3 (ref 0.7–3.1)
LYMPHOCYTES NFR BLD AUTO: 7.2 % (ref 19.6–45.3)
MCH RBC QN AUTO: 30.5 PG (ref 26.6–33)
MCHC RBC AUTO-ENTMCNC: 32.7 G/DL (ref 31.5–35.7)
MCV RBC AUTO: 93.5 FL (ref 79–97)
MONOCYTES # BLD AUTO: 0.98 10*3/MM3 (ref 0.1–0.9)
MONOCYTES NFR BLD AUTO: 12.1 % (ref 5–12)
NEUTROPHILS # BLD AUTO: 6.16 10*3/MM3 (ref 1.7–7)
NEUTROPHILS NFR BLD AUTO: 75.9 % (ref 42.7–76)
PLATELET # BLD AUTO: 46 10*3/MM3 (ref 140–450)
PMV BLD AUTO: 11.2 FL (ref 6–12)
PROTHROMBIN TIME: 34.4 SECONDS (ref 11.9–14.6)
RBC # BLD AUTO: 2.62 10*6/MM3 (ref 4.14–5.8)
WBC NRBC COR # BLD: 8.11 10*3/MM3 (ref 3.4–10.8)

## 2019-12-21 PROCEDURE — 85730 THROMBOPLASTIN TIME PARTIAL: CPT | Performed by: INTERNAL MEDICINE

## 2019-12-21 PROCEDURE — 85610 PROTHROMBIN TIME: CPT | Performed by: INTERNAL MEDICINE

## 2019-12-21 PROCEDURE — 85025 COMPLETE CBC W/AUTO DIFF WBC: CPT | Performed by: INTERNAL MEDICINE

## 2019-12-22 LAB
APTT PPP: 59.2 SECONDS (ref 24.1–35)
GLUCOSE BLDC GLUCOMTR-MCNC: 104 MG/DL (ref 70–130)
GLUCOSE BLDC GLUCOMTR-MCNC: 133 MG/DL (ref 70–130)
INR PPP: 3.18 (ref 0.91–1.09)
PROTHROMBIN TIME: 33.8 SECONDS (ref 11.9–14.6)

## 2019-12-22 PROCEDURE — 97110 THERAPEUTIC EXERCISES: CPT

## 2019-12-22 PROCEDURE — 82962 GLUCOSE BLOOD TEST: CPT

## 2019-12-22 PROCEDURE — 85730 THROMBOPLASTIN TIME PARTIAL: CPT | Performed by: INTERNAL MEDICINE

## 2019-12-22 PROCEDURE — 85610 PROTHROMBIN TIME: CPT | Performed by: INTERNAL MEDICINE

## 2019-12-22 PROCEDURE — 97530 THERAPEUTIC ACTIVITIES: CPT

## 2019-12-23 LAB
AMMONIA BLD-SCNC: 31 UMOL/L (ref 16–60)
ANION GAP SERPL CALCULATED.3IONS-SCNC: 17 MMOL/L (ref 5–15)
APTT PPP: 56.6 SECONDS (ref 24.1–35)
BASOPHILS # BLD AUTO: 0.08 10*3/MM3 (ref 0–0.2)
BASOPHILS NFR BLD AUTO: 0.9 % (ref 0–1.5)
BUN BLD-MCNC: 20 MG/DL (ref 8–23)
BUN/CREAT SERPL: 3.7 (ref 7–25)
CALCIUM SPEC-SCNC: 10.9 MG/DL (ref 8.6–10.5)
CHLORIDE SERPL-SCNC: 94 MMOL/L (ref 98–107)
CO2 SERPL-SCNC: 24 MMOL/L (ref 22–29)
CREAT BLD-MCNC: 5.46 MG/DL (ref 0.76–1.27)
DEPRECATED RDW RBC AUTO: 77.2 FL (ref 37–54)
EOSINOPHIL # BLD AUTO: 0.31 10*3/MM3 (ref 0–0.4)
EOSINOPHIL NFR BLD AUTO: 3.5 % (ref 0.3–6.2)
ERYTHROCYTE [DISTWIDTH] IN BLOOD BY AUTOMATED COUNT: 23.3 % (ref 12.3–15.4)
GFR SERPL CREATININE-BSD FRML MDRD: 11 ML/MIN/1.73
GFR SERPL CREATININE-BSD FRML MDRD: ABNORMAL ML/MIN/{1.73_M2}
GLUCOSE BLD-MCNC: 119 MG/DL (ref 65–99)
HCT VFR BLD AUTO: 25.9 % (ref 37.5–51)
HGB BLD-MCNC: 8.6 G/DL (ref 13–17.7)
INR PPP: 3.41 (ref 0.91–1.09)
LYMPHOCYTES # BLD AUTO: 0.49 10*3/MM3 (ref 0.7–3.1)
LYMPHOCYTES NFR BLD AUTO: 5.6 % (ref 19.6–45.3)
MCH RBC QN AUTO: 30.9 PG (ref 26.6–33)
MCHC RBC AUTO-ENTMCNC: 33.2 G/DL (ref 31.5–35.7)
MCV RBC AUTO: 93.2 FL (ref 79–97)
MONOCYTES # BLD AUTO: 1.25 10*3/MM3 (ref 0.1–0.9)
MONOCYTES NFR BLD AUTO: 14.2 % (ref 5–12)
NEUTROPHILS # BLD AUTO: 6.59 10*3/MM3 (ref 1.7–7)
NEUTROPHILS NFR BLD AUTO: 75 % (ref 42.7–76)
PLATELET # BLD AUTO: 90 10*3/MM3 (ref 140–450)
PMV BLD AUTO: 10.5 FL (ref 6–12)
POTASSIUM BLD-SCNC: 3.6 MMOL/L (ref 3.5–5.2)
PROTHROMBIN TIME: 35.7 SECONDS (ref 11.9–14.6)
RBC # BLD AUTO: 2.78 10*6/MM3 (ref 4.14–5.8)
SODIUM BLD-SCNC: 135 MMOL/L (ref 136–145)
WBC NRBC COR # BLD: 8.79 10*3/MM3 (ref 3.4–10.8)

## 2019-12-23 PROCEDURE — 85025 COMPLETE CBC W/AUTO DIFF WBC: CPT | Performed by: INTERNAL MEDICINE

## 2019-12-23 PROCEDURE — 80048 BASIC METABOLIC PNL TOTAL CA: CPT | Performed by: INTERNAL MEDICINE

## 2019-12-23 PROCEDURE — 85730 THROMBOPLASTIN TIME PARTIAL: CPT | Performed by: INTERNAL MEDICINE

## 2019-12-23 PROCEDURE — 25010000002 HEPARIN (PORCINE) PER 1000 UNITS: Performed by: INTERNAL MEDICINE

## 2019-12-23 PROCEDURE — 97110 THERAPEUTIC EXERCISES: CPT

## 2019-12-23 PROCEDURE — 85610 PROTHROMBIN TIME: CPT | Performed by: INTERNAL MEDICINE

## 2019-12-23 PROCEDURE — 82140 ASSAY OF AMMONIA: CPT | Performed by: INTERNAL MEDICINE

## 2019-12-23 PROCEDURE — 25010000002 ONDANSETRON PER 1 MG: Performed by: INTERNAL MEDICINE

## 2019-12-24 LAB
APTT PPP: 63 SECONDS (ref 24.1–35)
INR PPP: 3.77 (ref 0.91–1.09)
PROTHROMBIN TIME: 38.7 SECONDS (ref 11.9–14.6)

## 2019-12-24 PROCEDURE — 85610 PROTHROMBIN TIME: CPT | Performed by: INTERNAL MEDICINE

## 2019-12-24 PROCEDURE — 97535 SELF CARE MNGMENT TRAINING: CPT

## 2019-12-24 PROCEDURE — 85730 THROMBOPLASTIN TIME PARTIAL: CPT | Performed by: INTERNAL MEDICINE

## 2019-12-24 PROCEDURE — 97116 GAIT TRAINING THERAPY: CPT

## 2019-12-24 RX ORDER — LACTULOSE 20 G/30ML
20 SOLUTION ORAL DAILY
Status: DISCONTINUED | OUTPATIENT
Start: 2019-12-24 | End: 2020-01-13 | Stop reason: ALTCHOICE

## 2019-12-24 RX ORDER — WARFARIN SODIUM 2 MG/1
1 TABLET ORAL
Status: DISCONTINUED | OUTPATIENT
Start: 2019-12-25 | End: 2019-12-25

## 2019-12-25 LAB
APTT PPP: 65.4 SECONDS (ref 24.1–35)
INR PPP: 4.2 (ref 0.91–1.09)
PROTHROMBIN TIME: 42.2 SECONDS (ref 11.9–14.6)

## 2019-12-25 PROCEDURE — 85610 PROTHROMBIN TIME: CPT | Performed by: INTERNAL MEDICINE

## 2019-12-25 PROCEDURE — 85730 THROMBOPLASTIN TIME PARTIAL: CPT | Performed by: INTERNAL MEDICINE

## 2019-12-25 RX ORDER — WARFARIN SODIUM 2 MG/1
1 TABLET ORAL
Status: DISCONTINUED | OUTPATIENT
Start: 2019-12-26 | End: 2019-12-26

## 2019-12-26 LAB
APTT PPP: 64.8 SECONDS (ref 24.1–35)
GLUCOSE BLDC GLUCOMTR-MCNC: 111 MG/DL (ref 70–130)
INR PPP: 4.38 (ref 0.91–1.09)
PROTHROMBIN TIME: 43.6 SECONDS (ref 11.9–14.6)

## 2019-12-26 PROCEDURE — 82962 GLUCOSE BLOOD TEST: CPT

## 2019-12-26 PROCEDURE — 25010000002 HEPARIN (PORCINE) PER 1000 UNITS: Performed by: INTERNAL MEDICINE

## 2019-12-26 PROCEDURE — 85610 PROTHROMBIN TIME: CPT | Performed by: INTERNAL MEDICINE

## 2019-12-26 PROCEDURE — 85730 THROMBOPLASTIN TIME PARTIAL: CPT | Performed by: INTERNAL MEDICINE

## 2019-12-26 RX ORDER — WARFARIN SODIUM 2 MG/1
1 TABLET ORAL
Status: DISCONTINUED | OUTPATIENT
Start: 2019-12-27 | End: 2019-12-27

## 2019-12-26 RX ORDER — HEPARIN SODIUM 1000 [USP'U]/ML
4100 INJECTION, SOLUTION INTRAVENOUS; SUBCUTANEOUS ONCE
Status: DISCONTINUED | OUTPATIENT
Start: 2019-12-26 | End: 2020-01-13 | Stop reason: ALTCHOICE

## 2019-12-27 LAB
APTT PPP: 63.3 SECONDS (ref 24.1–35)
BASOPHILS # BLD AUTO: 0.08 10*3/MM3 (ref 0–0.2)
BASOPHILS NFR BLD AUTO: 1.2 % (ref 0–1.5)
DEPRECATED RDW RBC AUTO: 79.5 FL (ref 37–54)
EOSINOPHIL # BLD AUTO: 0.23 10*3/MM3 (ref 0–0.4)
EOSINOPHIL NFR BLD AUTO: 3.4 % (ref 0.3–6.2)
ERYTHROCYTE [DISTWIDTH] IN BLOOD BY AUTOMATED COUNT: 23.7 % (ref 12.3–15.4)
HCT VFR BLD AUTO: 24.1 % (ref 37.5–51)
HGB BLD-MCNC: 7.9 G/DL (ref 13–17.7)
INR PPP: 3.94 (ref 0.91–1.09)
LYMPHOCYTES # BLD AUTO: 0.49 10*3/MM3 (ref 0.7–3.1)
LYMPHOCYTES NFR BLD AUTO: 7.2 % (ref 19.6–45.3)
MCH RBC QN AUTO: 30.5 PG (ref 26.6–33)
MCHC RBC AUTO-ENTMCNC: 32.8 G/DL (ref 31.5–35.7)
MCV RBC AUTO: 93.1 FL (ref 79–97)
MONOCYTES # BLD AUTO: 0.93 10*3/MM3 (ref 0.1–0.9)
MONOCYTES NFR BLD AUTO: 13.7 % (ref 5–12)
NEUTROPHILS # BLD AUTO: 5.03 10*3/MM3 (ref 1.7–7)
NEUTROPHILS NFR BLD AUTO: 73.8 % (ref 42.7–76)
PLATELET # BLD AUTO: 63 10*3/MM3 (ref 140–450)
PMV BLD AUTO: 10.9 FL (ref 6–12)
PROTHROMBIN TIME: 40.1 SECONDS (ref 11.9–14.6)
RBC # BLD AUTO: 2.59 10*6/MM3 (ref 4.14–5.8)
WBC NRBC COR # BLD: 6.81 10*3/MM3 (ref 3.4–10.8)

## 2019-12-27 PROCEDURE — 85730 THROMBOPLASTIN TIME PARTIAL: CPT | Performed by: INTERNAL MEDICINE

## 2019-12-27 PROCEDURE — 25010000002 ONDANSETRON PER 1 MG: Performed by: INTERNAL MEDICINE

## 2019-12-27 PROCEDURE — 85610 PROTHROMBIN TIME: CPT | Performed by: INTERNAL MEDICINE

## 2019-12-27 PROCEDURE — 97168 OT RE-EVAL EST PLAN CARE: CPT

## 2019-12-27 PROCEDURE — 97530 THERAPEUTIC ACTIVITIES: CPT | Performed by: PHYSICAL THERAPIST

## 2019-12-27 PROCEDURE — 25010000002 HEPARIN (PORCINE) PER 1000 UNITS: Performed by: INTERNAL MEDICINE

## 2019-12-27 PROCEDURE — 97164 PT RE-EVAL EST PLAN CARE: CPT | Performed by: PHYSICAL THERAPIST

## 2019-12-27 PROCEDURE — 85025 COMPLETE CBC W/AUTO DIFF WBC: CPT | Performed by: INTERNAL MEDICINE

## 2019-12-27 RX ORDER — WARFARIN SODIUM 2 MG/1
1 TABLET ORAL
Status: DISCONTINUED | OUTPATIENT
Start: 2019-12-28 | End: 2020-01-06

## 2019-12-28 LAB
APTT PPP: 61.5 SECONDS (ref 24.1–35)
GLUCOSE BLDC GLUCOMTR-MCNC: 127 MG/DL (ref 70–130)
INR PPP: 3.87 (ref 0.91–1.09)
PROTHROMBIN TIME: 39.5 SECONDS (ref 11.9–14.6)

## 2019-12-28 PROCEDURE — 97110 THERAPEUTIC EXERCISES: CPT

## 2019-12-28 PROCEDURE — 85730 THROMBOPLASTIN TIME PARTIAL: CPT | Performed by: INTERNAL MEDICINE

## 2019-12-28 PROCEDURE — 85610 PROTHROMBIN TIME: CPT | Performed by: INTERNAL MEDICINE

## 2019-12-28 PROCEDURE — 82962 GLUCOSE BLOOD TEST: CPT

## 2019-12-28 PROCEDURE — 97116 GAIT TRAINING THERAPY: CPT

## 2019-12-29 LAB
APTT PPP: 57.5 SECONDS (ref 24.1–35)
INR PPP: 3.6 (ref 0.91–1.09)
PROTHROMBIN TIME: 37.3 SECONDS (ref 11.9–14.6)

## 2019-12-29 PROCEDURE — 85730 THROMBOPLASTIN TIME PARTIAL: CPT | Performed by: INTERNAL MEDICINE

## 2019-12-29 PROCEDURE — 85610 PROTHROMBIN TIME: CPT | Performed by: INTERNAL MEDICINE

## 2019-12-30 LAB
ANION GAP SERPL CALCULATED.3IONS-SCNC: 15 MMOL/L (ref 5–15)
APTT PPP: 58.9 SECONDS (ref 24.1–35)
BASOPHILS # BLD AUTO: 0.1 10*3/MM3 (ref 0–0.2)
BASOPHILS NFR BLD AUTO: 1.5 % (ref 0–1.5)
BUN BLD-MCNC: 17 MG/DL (ref 8–23)
BUN/CREAT SERPL: 3 (ref 7–25)
CALCIUM SPEC-SCNC: 10.3 MG/DL (ref 8.6–10.5)
CHLORIDE SERPL-SCNC: 95 MMOL/L (ref 98–107)
CO2 SERPL-SCNC: 25 MMOL/L (ref 22–29)
CREAT BLD-MCNC: 5.69 MG/DL (ref 0.76–1.27)
DEPRECATED RDW RBC AUTO: 77 FL (ref 37–54)
EOSINOPHIL # BLD AUTO: 0.32 10*3/MM3 (ref 0–0.4)
EOSINOPHIL NFR BLD AUTO: 4.9 % (ref 0.3–6.2)
ERYTHROCYTE [DISTWIDTH] IN BLOOD BY AUTOMATED COUNT: 22.5 % (ref 12.3–15.4)
GFR SERPL CREATININE-BSD FRML MDRD: 10 ML/MIN/1.73
GFR SERPL CREATININE-BSD FRML MDRD: ABNORMAL ML/MIN/{1.73_M2}
GLUCOSE BLD-MCNC: 112 MG/DL (ref 65–99)
HCT VFR BLD AUTO: 26.4 % (ref 37.5–51)
HGB BLD-MCNC: 8.7 G/DL (ref 13–17.7)
IMM GRANULOCYTES # BLD AUTO: 0.05 10*3/MM3 (ref 0–0.05)
IMM GRANULOCYTES NFR BLD AUTO: 0.8 % (ref 0–0.5)
INR PPP: 3.21 (ref 0.91–1.09)
LYMPHOCYTES # BLD AUTO: 0.52 10*3/MM3 (ref 0.7–3.1)
LYMPHOCYTES NFR BLD AUTO: 8 % (ref 19.6–45.3)
MCH RBC QN AUTO: 30.7 PG (ref 26.6–33)
MCHC RBC AUTO-ENTMCNC: 33 G/DL (ref 31.5–35.7)
MCV RBC AUTO: 93.3 FL (ref 79–97)
MONOCYTES # BLD AUTO: 0.95 10*3/MM3 (ref 0.1–0.9)
MONOCYTES NFR BLD AUTO: 14.7 % (ref 5–12)
NEUTROPHILS # BLD AUTO: 4.54 10*3/MM3 (ref 1.7–7)
NEUTROPHILS NFR BLD AUTO: 70.1 % (ref 42.7–76)
NRBC BLD AUTO-RTO: 0 /100 WBC (ref 0–0.2)
PLATELET # BLD AUTO: 75 10*3/MM3 (ref 140–450)
PMV BLD AUTO: 10.9 FL (ref 6–12)
POTASSIUM BLD-SCNC: 4 MMOL/L (ref 3.5–5.2)
PROTHROMBIN TIME: 34.1 SECONDS (ref 11.9–14.6)
RBC # BLD AUTO: 2.83 10*6/MM3 (ref 4.14–5.8)
SODIUM BLD-SCNC: 135 MMOL/L (ref 136–145)
WBC NRBC COR # BLD: 6.48 10*3/MM3 (ref 3.4–10.8)

## 2019-12-30 PROCEDURE — 85730 THROMBOPLASTIN TIME PARTIAL: CPT | Performed by: INTERNAL MEDICINE

## 2019-12-30 PROCEDURE — 85025 COMPLETE CBC W/AUTO DIFF WBC: CPT | Performed by: INTERNAL MEDICINE

## 2019-12-30 PROCEDURE — 85610 PROTHROMBIN TIME: CPT | Performed by: INTERNAL MEDICINE

## 2019-12-30 PROCEDURE — 80048 BASIC METABOLIC PNL TOTAL CA: CPT | Performed by: INTERNAL MEDICINE

## 2019-12-30 PROCEDURE — 25010000002 EPOETIN ALFA PER 1000 UNITS: Performed by: INTERNAL MEDICINE

## 2019-12-31 LAB
ANION GAP SERPL CALCULATED.3IONS-SCNC: 13 MMOL/L (ref 5–15)
APTT PPP: 56.6 SECONDS (ref 24.1–35)
BUN BLD-MCNC: 13 MG/DL (ref 8–23)
BUN/CREAT SERPL: 3 (ref 7–25)
CALCIUM SPEC-SCNC: 10 MG/DL (ref 8.6–10.5)
CHLORIDE SERPL-SCNC: 96 MMOL/L (ref 98–107)
CO2 SERPL-SCNC: 26 MMOL/L (ref 22–29)
CREAT BLD-MCNC: 4.38 MG/DL (ref 0.76–1.27)
GFR SERPL CREATININE-BSD FRML MDRD: 14 ML/MIN/1.73
GFR SERPL CREATININE-BSD FRML MDRD: ABNORMAL ML/MIN/{1.73_M2}
GLUCOSE BLD-MCNC: 129 MG/DL (ref 65–99)
INR PPP: 3.13 (ref 0.91–1.09)
MAGNESIUM SERPL-MCNC: 2.1 MG/DL (ref 1.6–2.4)
POTASSIUM BLD-SCNC: 4.2 MMOL/L (ref 3.5–5.2)
PROTHROMBIN TIME: 33.4 SECONDS (ref 11.9–14.6)
SODIUM BLD-SCNC: 135 MMOL/L (ref 136–145)

## 2019-12-31 PROCEDURE — 83735 ASSAY OF MAGNESIUM: CPT | Performed by: INTERNAL MEDICINE

## 2019-12-31 PROCEDURE — 85610 PROTHROMBIN TIME: CPT | Performed by: INTERNAL MEDICINE

## 2019-12-31 PROCEDURE — 80048 BASIC METABOLIC PNL TOTAL CA: CPT | Performed by: INTERNAL MEDICINE

## 2019-12-31 PROCEDURE — 85730 THROMBOPLASTIN TIME PARTIAL: CPT | Performed by: INTERNAL MEDICINE

## 2020-01-01 LAB
ANION GAP SERPL CALCULATED.3IONS-SCNC: 13 MMOL/L (ref 5–15)
APTT PPP: 60.3 SECONDS (ref 24.1–35)
BUN BLD-MCNC: 10 MG/DL (ref 8–23)
BUN/CREAT SERPL: 2.6 (ref 7–25)
CALCIUM SPEC-SCNC: 9.9 MG/DL (ref 8.6–10.5)
CHLORIDE SERPL-SCNC: 101 MMOL/L (ref 98–107)
CO2 SERPL-SCNC: 26 MMOL/L (ref 22–29)
CREAT BLD-MCNC: 3.85 MG/DL (ref 0.76–1.27)
GFR SERPL CREATININE-BSD FRML MDRD: 16 ML/MIN/1.73
GLUCOSE BLD-MCNC: 96 MG/DL (ref 65–99)
INR PPP: 3.49 (ref 0.91–1.09)
MAGNESIUM SERPL-MCNC: 2.1 MG/DL (ref 1.6–2.4)
POTASSIUM BLD-SCNC: 4.1 MMOL/L (ref 3.5–5.2)
PROTHROMBIN TIME: 36.4 SECONDS (ref 11.9–14.6)
SODIUM BLD-SCNC: 140 MMOL/L (ref 136–145)

## 2020-01-01 PROCEDURE — 83735 ASSAY OF MAGNESIUM: CPT | Performed by: INTERNAL MEDICINE

## 2020-01-01 PROCEDURE — 85610 PROTHROMBIN TIME: CPT | Performed by: INTERNAL MEDICINE

## 2020-01-01 PROCEDURE — 97110 THERAPEUTIC EXERCISES: CPT

## 2020-01-01 PROCEDURE — 97530 THERAPEUTIC ACTIVITIES: CPT

## 2020-01-01 PROCEDURE — 85730 THROMBOPLASTIN TIME PARTIAL: CPT | Performed by: INTERNAL MEDICINE

## 2020-01-01 PROCEDURE — 80048 BASIC METABOLIC PNL TOTAL CA: CPT | Performed by: INTERNAL MEDICINE

## 2020-01-01 NOTE — PROGRESS NOTES
Nephrology ([facility]) Progress Note      Patient:  Prnaay Gutierrez  YOB: 1955  Date of Service: 12/31/2019  MRN: [id number]   Acct: [id number]   Primary Care Physician: Dwaine Wiley PA-C  Advance Directive:   There are no questions and answers to display.     Admit Date: 12/12/2019       Hospital Day: 0  Referring Provider: Maxwell Gallego MD      Patient personally seen and examined.  Complete chart including Consults, Notes, Operative Reports, Labs, Cardiology, and Radiology studies reviewed as able.        Subjective:  Pranay Gutierrez is a 64 y.o. male  whom we were consulted for SARAH on HD.  First HD 10/27.  Also required CVVHD for a time.  Transferred to [facility] 12/12 for continued care.  Pt recalls no prior nephrology issues prior to SARAH requiring HD.  Notes/labs all reviewed as available.  Recalls no recent issues with dialysis.  Edema improved with RRT.  Denies cp/soa/n/v/cough.       Today, no new events.  On Levophed drip at this time. Tolerating HD.  More wiped out after HD today.          Temp- 97.5  Pulse- 71  Resp- 16  BP- 83/50  O2%- 99        Allergies:  Adhesive tape; Amoxicillin; Silicone; Penicillins; and Sulfamethoxazole-trimethoprim    Home Meds:  Medications Prior to Admission   Medication Sig Dispense Refill Last Dose   • ASMANEX 120 METERED DOSES 220 MCG/INH inhaler INHALE TWO PUFFS BY MOUTH TWICE A DAY 1 inhaler 11 Taking   • atorvastatin (LIPITOR) 20 MG tablet Take 20 mg by mouth Daily.   Taking   • citalopram (CeleXA) 20 MG tablet Take 20 mg by mouth Daily.   Taking   • desonide (DESOWEN) 0.05 % cream Apply 1 application topically 2 (Two) Times a Day.   Taking   • dofetilide (TIKOSYN) 250 MCG capsule Take 1 capsule by mouth Every 12 (Twelve) Hours. (Patient taking differently: Take 500 mcg by mouth Every 12 (Twelve) Hours.) 60 capsule 11 Taking   • furosemide (LASIX) 80 MG tablet Take 80 mg by mouth Daily.   Taking   • ipratropium (ATROVENT)  0.02 % nebulizer solution Take 500 mcg by nebulization 4 (Four) Times a Day.   Taking   • latanoprost (XALATAN) 0.005 % ophthalmic solution 1 drop Every Night.   Taking   • levalbuterol (XOPENEX HFA) 45 MCG/ACT inhaler Inhale 1-2 puffs Every 4 (Four) Hours As Needed for Wheezing.   Taking   • levalbuterol (XOPENEX) 1.25 MG/3ML nebulizer solution Take 3 mL by nebulization 3 (Three) Times a Day As Needed for Wheezing.   Taking   • magnesium oxide (MAGOX) 400 (241.3 Mg) MG tablet tablet Take 400 mg by mouth 2 (Two) Times a Day.   Taking   • Melatonin 10 MG tablet Take 10 mg by mouth Every Night.   Taking   • metOLazone (ZAROXOLYN) 2.5 MG tablet Take 2.5 mg by mouth Daily. 1 TABLET PO EVERY Monday, Wednesday & Friday   Taking   • metoprolol succinate XL (TOPROL-XL) 50 MG 24 hr tablet Take 75 mg by mouth 2 (Two) Times a Day.   Taking   • Metoprolol Tartrate 75 MG tablet Take 75 mg by mouth 2 (Two) Times a Day.   Taking   • Mometasone Furoate (ASMANEX HFA) 200 MCG/ACT aerosol Inhale 2 puffs 2 (Two) Times a Day.   Taking   • potassium chloride (K-DUR) 10 MEQ CR tablet Take 10 mEq by mouth 2 (Two) Times a Day.   Taking   • potassium chloride (K-DUR,KLOR-CON) 20 MEQ CR tablet Take 20 mEq by mouth 2 (Two) Times a Day. 30 mg am & 30 mg pm   Taking   • propylthiouracil (PTU) 50 MG tablet Take 50 mg by mouth Daily.   Taking   • spironolactone (ALDACTONE) 25 MG tablet Take 25 mg by mouth Daily.   Taking   • warfarin (COUMADIN) 2 MG tablet Take 2.5 mg by mouth Daily.   Taking       Medicines:  Current Facility-Administered Medications   Medication Dose Route Frequency Provider Last Rate Last Dose   • acetaminophen (TYLENOL) tablet 500 mg  500 mg Oral Q6H PRN Maxwell Gallego MD       • albumin human 25 % IV SOLN 12.5 g  12.5 g Intravenous Q5 Min PRN Renny Floyd MD       • b complex-vitamin c-folic acid (NEPHRO-JENA) tablet 1 tablet  1 tablet Oral Daily Maxwell Gallego MD       • bumetanide (BUMEX) 10 mg  in sodium chloride 0.9 % 100 mL (0.1 mg/mL) infusion  0.5 mg/hr Intravenous Continuous Adriel Viera MD       • dextrose (D50W) 25 g/ 50mL Intravenous Solution 25 g  25 g Intravenous Q15 Min PRN Maxwell Gallego MD       • dextrose (GLUTOSE) oral gel 15 g  15 g Oral Q15 Min PRN Maxwell Gallego MD       • epoetin haylee (EPOGEN,PROCRIT) injection 10,000 Units  10,000 Units Subcutaneous Weekly Sravan Adam MD       • flecainide (TAMBOCOR) tablet 25 mg  25 mg Oral Q12H Maxwell Gallego MD       • folic acid (FOLVITE) tablet 1 mg  1 mg Oral Daily Maxwell Gallego MD       • glucagon (human recombinant) (GLUCAGEN DIAGNOSTIC) injection 1 mg  1 mg Subcutaneous Q15 Min PRN Maxwell Gallego MD       • heparin (porcine) injection 2,000 Units  2,000 Units Intracatheter PRN Renny Floyd MD   2,000 Units at 12/18/19 1220   • heparin (porcine) injection 2,000 Units  2,000 Units Intracatheter PRN Renny Floyd MD   2,000 Units at 12/18/19 1221   • heparin (porcine) injection 4,100 Units  4,100 Units Intravenous Once Adriel Viera MD       • heparin (porcine) injection 4,100 Units  4,100 Units Intravenous Once Adriel Viera MD       • hypromellose (ISOPTO TEARS) 0.5 % ophthalmic solution 1 drop  1 drop Both Eyes Q4H PRN Maxwell Gallego MD       • lactulose solution 20 g  20 g Oral Daily Maxwell Gallego MD       • latanoprost (XALATAN) 0.005 % ophthalmic solution 1 drop  1 drop Left Eye Nightly Maxwell Gallego MD       • melatonin tablet 6 mg  6 mg Oral Nightly Maxwell Gallego MD       • midodrine (PROAMATINE) tablet 10 mg  10 mg Oral Q8H Sravan Adam MD       • norepinephrine (LEVOPHED) 8,000 mcg in sodium chloride 0.9 % 250 mL (32 mcg/mL) infusion  0.02-0.3 mcg/kg/min Intravenous Titrated Maxwell Gallego MD       • ondansetron (ZOFRAN) tablet 4 mg  4 mg Oral Q6H PRN Maxwell Gallego MD        Or   •  ondansetron (ZOFRAN) injection 4 mg  4 mg Intravenous Q6H PRN Maxwell Gallego MD       • pantoprazole (PROTONIX) EC tablet 40 mg  40 mg Oral Q AM Maxwell Gallego MD       • riFAXIMin (XIFAXAN) tablet 550 mg  550 mg Oral Q12H Maxwell Gallego MD       • rOPINIRole (REQUIP) tablet 0.25 mg  0.25 mg Oral TID Maxwell Gallego MD       • sodium chloride nasal spray 1 spray  1 spray Each Nare Q1H PRN Maxwell Gallego MD       • vitamin A & D ointment   Topical Q12H Maxwell Gallego MD       • Meseret's amazing butt cream   Topical PRN Celina Cedeno, APRN       • Meseret's amazing butt cream   Topical TID Celina Cedeno, APRN       • Meseret's amazing butt cream   Topical BID Celina Cedeno, APRN       • warfarin (COUMADIN) tablet 1 mg  1 mg Oral Daily Maxwell Gallego MD           Past Medical History:  Past Medical History:   Diagnosis Date   • Atelectasis, left 12/9/2018   • Cerebrovascular disease, acute    • Chest pain    • Chronic atrial fibrillation (CMS/HCC)      ablation X 3; followed by Dr. Perez chronically anticoagulated with Coumadin   • Class 3 severe obesity with serious comorbidity and body mass index (BMI) of 40.0 to 44.9 in adult (CMS/HCC) 12/9/2018   • COPD (chronic obstructive pulmonary disease) (CMS/HCC)    • Diaphragm dysfunction 12/9/2018   • Glaucoma    • Hypertrophic cardiomyopathy (CMS/HCC)    • Intermittent palpitations    • Lung nodule 6/24/2019   • Mitral valve replaced    • Mixed hyperlipidemia    • Moderate persistent asthma without complication 12/9/2018   • Obstructive sleep apnea 12/9/2018   • Obstructive sleep apnea, adult    • Pacemaker    • Rash 6/24/2019    Arms and legs   • Restrictive lung disease 12/9/2018   • Stroke (CMS/HCC)     x2       Past Surgical History:  Past Surgical History:   Procedure Laterality Date   • APPENDECTOMY     • CARDIAC ABLATION  01/03/2012   • CORONARY ARTERY BYPASS GRAFT     • HEMORROIDECTOMY     • MITRAL  VALVE REPLACEMENT     • PACEMAKER IMPLANTATION      Cardiac Pacemaker Insertion   • VASECTOMY         Family History  Family History   Problem Relation Age of Onset   • Alzheimer's disease Mother    • Diabetes Father    • Stroke Father        Social History  Social History     Socioeconomic History   • Marital status:      Spouse name: Not on file   • Number of children: Not on file   • Years of education: Not on file   • Highest education level: Not on file   Tobacco Use   • Smoking status: Never Smoker   • Smokeless tobacco: Never Used   Substance and Sexual Activity   • Alcohol use: No   • Drug use: No   • Sexual activity: Defer         Review of Systems:  History obtained from chart review and the patient  General ROS: No fever or chills  Respiratory ROS: No cough, shortness of breath, wheezing  Cardiovascular ROS: No chest pain or palpitations  Gastrointestinal ROS: No abdominal pain or melena  Genito-Urinary ROS: No dysuria or hematuria    Objective:  No data found.  No intake or output data in the 24 hours ending 12/31/19 2113  General: awake/alert   Chest:  clear to auscultation bilaterally without respiratory distress  CVS: regular rate and rhythm  Abdominal: soft, nontender, positive bowel sounds  Extremities: ble edema  Skin: warm/dry with ble stasis changes      Labs:  Results from last 7 days   Lab Units 12/30/19  0459 12/27/19  0707   WBC 10*3/mm3 6.48 6.81   HEMOGLOBIN g/dL 8.7* 7.9*   HEMATOCRIT % 26.4* 24.1*   PLATELETS 10*3/mm3 75* 63*         Results from last 7 days   Lab Units 12/31/19  0529 12/30/19  0459   SODIUM mmol/L 135* 135*   POTASSIUM mmol/L 4.2 4.0   CHLORIDE mmol/L 96* 95*   CO2 mmol/L 26.0 25.0   BUN mg/dL 13 17   CREATININE mg/dL 4.38* 5.69*   CALCIUM mg/dL 10.0 10.3   GLUCOSE mg/dL 129* 112*       Radiology:   Imaging Results (Last 72 Hours)     ** No results found for the last 72 hours. **          Culture:  No results found for: BLOODCX, URINECX, WOUNDCX, MRSACX, RESPCX,  STOOLCX      Assessment   SARAH  CKD 3  Hepatorenal syndrome  Anasarca  Acute/chronic diastolic CHF  Anemia/thrombocytopenia    Plan:  D/w pt/nursing  HD MTTS given volume overload and HD holiday schedule  Monitor labs  Wean levophed as tolerated  bumex drip has subjectively improved UOP, continue    Renny Floyd MD  12/31/2019  9:13 PM

## 2020-01-01 NOTE — PROGRESS NOTES
[facility]  Gabriel Gallego M.D.  SHARRON Gallego M.D.  GLORIA Shafer APRN      Internal Medicine Progress Note    1/1/2020   4:10 PM    Name:  Pranay Gutierrez  MRN:    [id number]     Acct:     [id number]   Room:  [address] [place] [address] Day: 0     Admit Date: 12/12/2019  3:06 PM  PCP: Dwaine Wiley PA-C    Subjective:     C/C: Continued HD and rehabilitation    Interval History: Status: Improved. Resting in bed. Wife at bedside and has brought soda from a fast food restaurant. Wife concerned that the patient's breathing appears more labored today. Remains noncompliant with fluid restriction. Once again, discussed need for fluid restriction with patient and wife - staff reports that had educated multiple times today, as well. HD in am. Currently off levophed.     Review of Systems   Constitution: Positive for malaise/fatigue. Negative for chills, decreased appetite and fever.   HENT: Negative for congestion, hoarse voice, nosebleeds and sore throat.    Eyes: Negative for blurred vision, discharge, pain and visual disturbance.   Cardiovascular: Negative for chest pain, dyspnea on exertion, irregular heartbeat, leg swelling, orthopnea and palpitations.   Respiratory: Negative for cough, shortness of breath, sputum production and wheezing.    Hematologic/Lymphatic: Negative for bleeding problem. Does not bruise/bleed easily.   Skin: Negative for dry skin, flushing, itching, poor wound healing, rash and suspicious lesions.        Significant ecchymosis   Musculoskeletal: Negative for arthritis, back pain, falls, joint pain, joint swelling, muscle weakness and myalgias.   Gastrointestinal: Negative for bloating, abdominal pain, change in bowel habit, diarrhea, flatus, heartburn, melena and nausea.   Genitourinary: Negative for frequency, hesitancy, incomplete emptying, pelvic pain and urgency.   Neurological: Negative for difficulty with concentration, disturbances in coordination, dizziness,  headaches, numbness, paresthesias, tremors and weakness.   Psychiatric/Behavioral: Negative for altered mental status, hallucinations and memory loss. The patient is not nervous/anxious.          Medications:     Allergies:   Allergies   Allergen Reactions   • Adhesive Tape Other (See Comments)   • Amoxicillin Other (See Comments)     unknkown   • Silicone Other (See Comments)     unknown   • Penicillins Hives   • Sulfamethoxazole-Trimethoprim Nausea Only       Current Meds:   Current Facility-Administered Medications:   •  acetaminophen (TYLENOL) tablet 500 mg, 500 mg, Oral, Q6H PRN, Maxwell Gallego MD  •  albumin human 25 % IV SOLN 12.5 g, 12.5 g, Intravenous, Q5 Min PRN, Renny Floyd MD  •  b complex-vitamin c-folic acid (NEPHRO-JENA) tablet 1 tablet, 1 tablet, Oral, Daily, Maxwell Gallego MD  •  bumetanide (BUMEX) 10 mg in sodium chloride 0.9 % 100 mL (0.1 mg/mL) infusion, 0.5 mg/hr, Intravenous, Continuous, Adriel Viera MD  •  dextrose (D50W) 25 g/ 50mL Intravenous Solution 25 g, 25 g, Intravenous, Q15 Min PRN, Maxwell Gallego MD  •  dextrose (GLUTOSE) oral gel 15 g, 15 g, Oral, Q15 Min PRN, Maxwell Gallego MD  •  epoetin haylee (EPOGEN,PROCRIT) injection 10,000 Units, 10,000 Units, Subcutaneous, Weekly, Sravan Adam MD  •  flecainide (TAMBOCOR) tablet 25 mg, 25 mg, Oral, Q12H, Maxwell Gallego MD  •  folic acid (FOLVITE) tablet 1 mg, 1 mg, Oral, Daily, Maxwell Gallego MD  •  glucagon (human recombinant) (GLUCAGEN DIAGNOSTIC) injection 1 mg, 1 mg, Subcutaneous, Q15 Min PRN, Maxwell Gallego MD  •  heparin (porcine) injection 2,000 Units, 2,000 Units, Intracatheter, PRN, Renny Floyd MD, 2,000 Units at 12/18/19 1220  •  heparin (porcine) injection 2,000 Units, 2,000 Units, Intracatheter, PRN, Renny Floyd MD, 2,000 Units at 12/18/19 1221  •  heparin (porcine) injection 4,100 Units, 4,100 Units, Intravenous, Once,  Adriel Viera MD  •  heparin (porcine) injection 4,100 Units, 4,100 Units, Intravenous, Once, Adriel Viera MD  •  hypromellose (ISOPTO TEARS) 0.5 % ophthalmic solution 1 drop, 1 drop, Both Eyes, Q4H PRN, Maxwell Gallego MD  •  lactulose solution 20 g, 20 g, Oral, Daily, Maxwell Gallego MD  •  latanoprost (XALATAN) 0.005 % ophthalmic solution 1 drop, 1 drop, Left Eye, Nightly, Maxwell Gallego MD  •  melatonin tablet 6 mg, 6 mg, Oral, Nightly, Maxwell Gallego MD  •  midodrine (PROAMATINE) tablet 10 mg, 10 mg, Oral, Q8H, Sravan Adam MD  •  norepinephrine (LEVOPHED) 8,000 mcg in sodium chloride 0.9 % 250 mL (32 mcg/mL) infusion, 0.02-0.3 mcg/kg/min, Intravenous, Titrated, Maxwell Gallego MD  •  ondansetron (ZOFRAN) tablet 4 mg, 4 mg, Oral, Q6H PRN **OR** ondansetron (ZOFRAN) injection 4 mg, 4 mg, Intravenous, Q6H PRN, Maxwell Gallego MD  •  pantoprazole (PROTONIX) EC tablet 40 mg, 40 mg, Oral, Q AM, Maxwell Gallego MD  •  riFAXIMin (XIFAXAN) tablet 550 mg, 550 mg, Oral, Q12H, Maxwell Gallego MD  •  rOPINIRole (REQUIP) tablet 0.25 mg, 0.25 mg, Oral, TID, Maxwell Gallego MD  •  sodium chloride nasal spray 1 spray, 1 spray, Each Nare, Q1H PRN, Maxwell Gallego MD  •  vitamin A & D ointment, , Topical, Q12H, Maxwell Gallego MD  •  Meseret's amazing butt cream, , Topical, PRN, Celina Cedeno, APRN  •  Meseret's amazing butt cream, , Topical, TID, eClina Cedeno, APRN  •  Meseret's amazing butt cream, , Topical, BID, Celina Cedeno, APRN  •  warfarin (COUMADIN) tablet 1 mg, 1 mg, Oral, Daily, Maxwell Gallego MD    Data:     Code Status:    There are no questions and answers to display.       Family History   Problem Relation Age of Onset   • Alzheimer's disease Mother    • Diabetes Father    • Stroke Father        Social History     Socioeconomic History   • Marital status:      Spouse name: Not on file  "  • Number of children: Not on file   • Years of education: Not on file   • Highest education level: Not on file   Tobacco Use   • Smoking status: Never Smoker   • Smokeless tobacco: Never Used   Substance and Sexual Activity   • Alcohol use: No   • Drug use: No   • Sexual activity: Defer       Vitals:  Ht 177.8 cm (70\")   Wt 133 kg (293 lb 6.4 oz)   BMI 42.10 kg/m²   T 98.7P 67 R 20 BP 70/34 Sp02 99% (room air)  I/O (24Hr):  No intake or output data in the 24 hours ending 01/01/20 1610    Labs and imaging:      Lab Results (last 24 hours)     Procedure Component Value Units Date/Time    Basic Metabolic Panel [[id number]]  (Abnormal) Collected:  01/01/20 0503    Specimen:  Blood Updated:  01/01/20 0608     Glucose 96 mg/dL      BUN 10 mg/dL      Creatinine 3.85 mg/dL      Sodium 140 mmol/L      Potassium 4.1 mmol/L      Chloride 101 mmol/L      CO2 26.0 mmol/L      Calcium 9.9 mg/dL      eGFR Non African Amer 16 mL/min/1.73      BUN/Creatinine Ratio 2.6     Anion Gap 13.0 mmol/L     Narrative:       GFR Normal >60  Chronic Kidney Disease <60  Kidney Failure <15      Magnesium [[id number]]  (Normal) Collected:  01/01/20 0503    Specimen:  Blood Updated:  01/01/20 0608     Magnesium 2.1 mg/dL     aPTT [[id number]]  (Abnormal) Collected:  01/01/20 0503    Specimen:  Blood Updated:  01/01/20 0600     PTT 60.3 seconds     Protime-INR [[id number]]  (Abnormal) Collected:  01/01/20 0503    Specimen:  Blood Updated:  01/01/20 0600     Protime 36.4 Seconds      INR 3.49            Xr Chest 1 View    Result Date: 12/19/2019  Narrative: HISTORY: Verify PICC placement  CXR: A frontal view the chest obtained.  COMPARISON: 12/12/2019  FINDINGS: Left upper extremity PICC line is in place with the tip projecting over the SVC. There is a tunneled right internal jugular permacatheter. There is left subclavian cardiac pacer device. The prosthetic coronary valve with median sternotomy wires.  Cardiac silhouette is enlarged. Probable " patchy left basilar atelectasis with a questionable trace left pleural effusion. No pneumothorax.      Impression: 1. Left approximately PICC line tip projecting in the SVC. Additional lines described above. 2. Cardiomegaly with probable patchy left basilar atelectasis. This report was finalized on 12/19/2019 14:54 by Dr. Ivanna Eduardo MD.    Xr Chest 1 View    Result Date: 12/12/2019  Narrative: Exam:   XR CHEST 1 VW-   Date:  12/12/2019  History:  Male, age  64 years;confirmation of all lines (ETT, tracheostomy tube, central venous catheters) on arrival  COMPARISON:  Chest x-ray dated 12/05/2018.  Findings : There is a new central venous catheter, IJ approach, terminating over the mid to low SVC region. The left-sided cardiac device with leads project over the right atrium and right ventricle. Prior aortic valve replacement. Median sternotomy wires appear intact. The heart and mediastinum are unchanged in size. Obscuration of the left lung base, concerning for consolidative process.  The bones show no acute pathology.       Impression: Impression:  1.  Lines as described above. 2.  Obscuration of the left lung base, concerning for a left lower lobe consolidative process.  This report was finalized on 12/12/2019 20:39 by Dr. Sana Terrazas MD.      Physical Examination:        Physical Exam   Constitutional: He is oriented to person, place, and time. Vital signs are normal. He appears well-developed and well-nourished. He is cooperative.   HENT:   Head: Normocephalic and atraumatic.   Nose: Nose normal.   Mouth/Throat: Oropharynx is clear and moist.   Eyes: Pupils are equal, round, and reactive to light. Conjunctivae, EOM and lids are normal.   Neck: Trachea normal and normal range of motion. Neck supple.   Cardiovascular: Normal rate, regular rhythm and normal heart sounds.   Pulmonary/Chest: Effort normal and breath sounds normal.   Abdominal: Soft. Normal appearance and bowel sounds are normal.   Obese    Musculoskeletal: Normal range of motion. He exhibits edema (diffuse).   Generalized weakness   Neurological: He is alert and oriented to person, place, and time. He has normal strength. No cranial nerve deficit or sensory deficit.   Skin: Skin is warm, dry and intact. No petechiae and no rash noted. No cyanosis or erythema. Nails show no clubbing.   Scattered ecchymosis bilateral upper extremities  Jaundice  BLE discolored   Psychiatric: He has a normal mood and affect. His speech is normal and behavior is normal. Judgment and thought content normal. Cognition and memory are normal.   Nursing note and vitals reviewed.        Assessment:             * No active hospital problems. *    Past Medical History:   Diagnosis Date   • Atelectasis, left 12/9/2018   • Cerebrovascular disease, acute    • Chest pain    • Chronic atrial fibrillation (CMS/HCC)      ablation X 3; followed by Dr. Perez chronically anticoagulated with Coumadin   • Class 3 severe obesity with serious comorbidity and body mass index (BMI) of 40.0 to 44.9 in adult (CMS/HCC) 12/9/2018   • COPD (chronic obstructive pulmonary disease) (CMS/HCC)    • Diaphragm dysfunction 12/9/2018   • Glaucoma    • Hypertrophic cardiomyopathy (CMS/HCC)    • Intermittent palpitations    • Lung nodule 6/24/2019   • Mitral valve replaced    • Mixed hyperlipidemia    • Moderate persistent asthma without complication 12/9/2018   • Obstructive sleep apnea 12/9/2018   • Obstructive sleep apnea, adult    • Pacemaker    • Rash 6/24/2019    Arms and legs   • Restrictive lung disease 12/9/2018   • Stroke (CMS/HCC)     x2        Plan:        1. Acute on chronic diastolic CHF  2. Acute kidney injury on CKD4  3. Multifactorial anemia  4. Chronic anticoagulation  5. MINH  6. COPD  7. Hx CVA  8. Hypertrophic cardiomyopathy  9. Hx MVR  10. Atrial fibrillation  11. PATTERSON with cirrhosis  12. Hyperthyroidism  13. Thrombocytopenia, most likely secondary to liver disease and consumption due to  acute illness     Continue current treatment. Monitor counts. Increase activity as tolerated. Aggressive therapies as tolerated. Labs with HD. Continue blood pressure support as needed with levophed. Appreciate cardiology input. Hold coumadin for inr > 3.5.  HD per nephrology. Monitor platelets closely. Encourage compliance with fluid restriction.       Electronically signed by GLORIA Huggins on 1/1/2020 at 4:10 PM

## 2020-01-01 NOTE — PROGRESS NOTES
[facility]  RA Peterson M.D.  GLORIA Shafer APRN      Internal Medicine Progress Note    12/31/2019   9:14 PM    Name:  Pranay Gutierrez  MRN:    [id number]     Acct:     [id number]   Room:  [address] [place] [address] Day: 0     Admit Date: 12/12/2019  3:06 PM  PCP: Dwaine Wiley PA-C    Subjective:     C/C: Continued HD and rehabilitation    Interval History: Status: Improved. Resting in bed. No family at bedside.     Review of Systems   Constitution: Positive for malaise/fatigue. Negative for chills, decreased appetite and fever.   HENT: Negative for congestion, hoarse voice, nosebleeds and sore throat.    Eyes: Negative for blurred vision, discharge, pain and visual disturbance.   Cardiovascular: Negative for chest pain, dyspnea on exertion, irregular heartbeat, leg swelling, orthopnea and palpitations.   Respiratory: Negative for cough, shortness of breath, sputum production and wheezing.    Hematologic/Lymphatic: Negative for bleeding problem. Does not bruise/bleed easily.   Skin: Negative for dry skin, flushing, itching, poor wound healing, rash and suspicious lesions.        Significant ecchymosis   Musculoskeletal: Negative for arthritis, back pain, falls, joint pain, joint swelling, muscle weakness and myalgias.   Gastrointestinal: Negative for bloating, abdominal pain, change in bowel habit, diarrhea, flatus, heartburn, melena and nausea.   Genitourinary: Negative for frequency, hesitancy, incomplete emptying, pelvic pain and urgency.   Neurological: Negative for difficulty with concentration, disturbances in coordination, dizziness, headaches, numbness, paresthesias, tremors and weakness.   Psychiatric/Behavioral: Negative for altered mental status, hallucinations and memory loss. The patient is not nervous/anxious.     Anxious to get home.  Working with therapies.      Medications:     Allergies:   Allergies   Allergen Reactions   • Adhesive Tape Other  (See Comments)   • Amoxicillin Other (See Comments)     unknkown   • Silicone Other (See Comments)     unknown   • Penicillins Hives   • Sulfamethoxazole-Trimethoprim Nausea Only       Current Meds:   Current Facility-Administered Medications:   •  acetaminophen (TYLENOL) tablet 500 mg, 500 mg, Oral, Q6H PRN, Maxwell Gallego MD  •  albumin human 25 % IV SOLN 12.5 g, 12.5 g, Intravenous, Q5 Min PRN, Renny Floyd MD  •  b complex-vitamin c-folic acid (NEPHRO-JENA) tablet 1 tablet, 1 tablet, Oral, Daily, Maxwell Gallego MD  •  bumetanide (BUMEX) 10 mg in sodium chloride 0.9 % 100 mL (0.1 mg/mL) infusion, 0.5 mg/hr, Intravenous, Continuous, Adriel Viera MD  •  dextrose (D50W) 25 g/ 50mL Intravenous Solution 25 g, 25 g, Intravenous, Q15 Min PRN, Maxwell Gallego MD  •  dextrose (GLUTOSE) oral gel 15 g, 15 g, Oral, Q15 Min PRN, Maxwell Gallego MD  •  epoetin haylee (EPOGEN,PROCRIT) injection 10,000 Units, 10,000 Units, Subcutaneous, Weekly, Sravan Adam MD  •  flecainide (TAMBOCOR) tablet 25 mg, 25 mg, Oral, Q12H, Maxwell Gallego MD  •  folic acid (FOLVITE) tablet 1 mg, 1 mg, Oral, Daily, Maxwell Gallego MD  •  glucagon (human recombinant) (GLUCAGEN DIAGNOSTIC) injection 1 mg, 1 mg, Subcutaneous, Q15 Min PRN, Maxwell Gallego MD  •  heparin (porcine) injection 2,000 Units, 2,000 Units, Intracatheter, PRN, Renny Floyd MD, 2,000 Units at 12/18/19 1220  •  heparin (porcine) injection 2,000 Units, 2,000 Units, Intracatheter, PRN, Renny Floyd MD, 2,000 Units at 12/18/19 1221  •  heparin (porcine) injection 4,100 Units, 4,100 Units, Intravenous, Once, Adriel Viera MD  •  heparin (porcine) injection 4,100 Units, 4,100 Units, Intravenous, Once, Adriel Viera MD  •  hypromellose (ISOPTO TEARS) 0.5 % ophthalmic solution 1 drop, 1 drop, Both Eyes, Q4H PRN, Maxwell Gallego MD  •  lactulose solution 20 g,  20 g, Oral, Daily, Maxwell Gallego MD  •  latanoprost (XALATAN) 0.005 % ophthalmic solution 1 drop, 1 drop, Left Eye, Nightly, Maxwell Gallego MD  •  melatonin tablet 6 mg, 6 mg, Oral, Nightly, Maxwell Gallego MD  •  midodrine (PROAMATINE) tablet 10 mg, 10 mg, Oral, Q8H, Sravan Adam MD  •  norepinephrine (LEVOPHED) 8,000 mcg in sodium chloride 0.9 % 250 mL (32 mcg/mL) infusion, 0.02-0.3 mcg/kg/min, Intravenous, Titrated, Maxwell Gallego MD  •  ondansetron (ZOFRAN) tablet 4 mg, 4 mg, Oral, Q6H PRN **OR** ondansetron (ZOFRAN) injection 4 mg, 4 mg, Intravenous, Q6H PRN, Maxwell Gallego MD  •  pantoprazole (PROTONIX) EC tablet 40 mg, 40 mg, Oral, Q AM, Maxewll Gallego MD  •  riFAXIMin (XIFAXAN) tablet 550 mg, 550 mg, Oral, Q12H, Maxwell Gallego MD  •  rOPINIRole (REQUIP) tablet 0.25 mg, 0.25 mg, Oral, TID, Maxwell Gallego MD  •  sodium chloride nasal spray 1 spray, 1 spray, Each Nare, Q1H PRN, Maxwell Gallego MD  •  vitamin A & D ointment, , Topical, Q12H, Maxwell Gallego MD  •  Meseret's amazing butt cream, , Topical, PRN, Celina Cedeno, APRN  •  Meseret's amazing butt cream, , Topical, TID, Celina Cedeno, APRN  •  Meseret's amazing butt cream, , Topical, BID, Celina Cedeno, APRN  •  warfarin (COUMADIN) tablet 1 mg, 1 mg, Oral, Daily, Maxwell Gallego MD    Data:     Code Status:    There are no questions and answers to display.       Family History   Problem Relation Age of Onset   • Alzheimer's disease Mother    • Diabetes Father    • Stroke Father        Social History     Socioeconomic History   • Marital status:      Spouse name: Not on file   • Number of children: Not on file   • Years of education: Not on file   • Highest education level: Not on file   Tobacco Use   • Smoking status: Never Smoker   • Smokeless tobacco: Never Used   Substance and Sexual Activity   • Alcohol use: No   • Drug use: No   • Sexual  "activity: Defer       Vitals:  Ht 177.8 cm (70\")   Wt 133 kg (293 lb 6.4 oz)   BMI 42.10 kg/m²   T 97.5 pulse 71 respiratory 16 blood pressure 83/50 sat 99% (room air)  I/O (24Hr):  No intake or output data in the 24 hours ending 12/31/19 2114    Labs and imaging:      Lab Results (last 24 hours)     Procedure Component Value Units Date/Time    aPTT [[id number]]  (Abnormal) Collected:  12/31/19 0529    Specimen:  Blood Updated:  12/31/19 0627     PTT 56.6 seconds     Protime-INR [[id number]]  (Abnormal) Collected:  12/31/19 0529    Specimen:  Blood Updated:  12/31/19 0627     Protime 33.4 Seconds      INR 3.13    Basic Metabolic Panel [[id number]]  (Abnormal) Collected:  12/31/19 0529    Specimen:  Blood Updated:  12/31/19 0609     Glucose 129 mg/dL      BUN 13 mg/dL      Creatinine 4.38 mg/dL      Sodium 135 mmol/L      Potassium 4.2 mmol/L      Chloride 96 mmol/L      CO2 26.0 mmol/L      Calcium 10.0 mg/dL      eGFR   Amer --     Comment: <15 Indicative of kidney failure.        eGFR Non African Amer 14 mL/min/1.73      Comment: <15 Indicative of kidney failure.        BUN/Creatinine Ratio 3.0     Anion Gap 13.0 mmol/L     Narrative:       GFR Normal >60  Chronic Kidney Disease <60  Kidney Failure <15      Magnesium [[id number]]  (Normal) Collected:  12/31/19 0529    Specimen:  Blood Updated:  12/31/19 0609     Magnesium 2.1 mg/dL             Xr Chest 1 View    Result Date: 12/19/2019  Narrative: HISTORY: Verify PICC placement  CXR: A frontal view the chest obtained.  COMPARISON: 12/12/2019  FINDINGS: Left upper extremity PICC line is in place with the tip projecting over the SVC. There is a tunneled right internal jugular permacatheter. There is left subclavian cardiac pacer device. The prosthetic coronary valve with median sternotomy wires.  Cardiac silhouette is enlarged. Probable patchy left basilar atelectasis with a questionable trace left pleural effusion. No pneumothorax.      Impression: 1. Left " approximately PICC line tip projecting in the SVC. Additional lines described above. 2. Cardiomegaly with probable patchy left basilar atelectasis. This report was finalized on 12/19/2019 14:54 by Dr. Ivanna Eduardo MD.    Xr Chest 1 View    Result Date: 12/12/2019  Narrative: Exam:   XR CHEST 1 VW-   Date:  12/12/2019  History:  Male, age  64 years;confirmation of all lines (ETT, tracheostomy tube, central venous catheters) on arrival  COMPARISON:  Chest x-ray dated 12/05/2018.  Findings : There is a new central venous catheter, IJ approach, terminating over the mid to low SVC region. The left-sided cardiac device with leads project over the right atrium and right ventricle. Prior aortic valve replacement. Median sternotomy wires appear intact. The heart and mediastinum are unchanged in size. Obscuration of the left lung base, concerning for consolidative process.  The bones show no acute pathology.       Impression: Impression:  1.  Lines as described above. 2.  Obscuration of the left lung base, concerning for a left lower lobe consolidative process.  This report was finalized on 12/12/2019 20:39 by Dr. Sana Terrazas MD.      Physical Examination:        Physical Exam   Constitutional: He is oriented to person, place, and time. Vital signs are normal. He appears well-developed and well-nourished. He is cooperative.   HENT:   Head: Normocephalic and atraumatic.   Nose: Nose normal.   Mouth/Throat: Oropharynx is clear and moist.   Eyes: Pupils are equal, round, and reactive to light. Conjunctivae, EOM and lids are normal.   Neck: Trachea normal and normal range of motion. Neck supple.   Cardiovascular: Normal rate, regular rhythm and normal heart sounds.   Pulmonary/Chest: Effort normal and breath sounds normal.   Abdominal: Soft. Normal appearance and bowel sounds are normal.   Obese   Musculoskeletal: Normal range of motion. He exhibits edema (diffuse).   Generalized weakness   Neurological: He is alert and  oriented to person, place, and time. He has normal strength. No cranial nerve deficit or sensory deficit.   Skin: Skin is warm, dry and intact. No petechiae and no rash noted. No cyanosis or erythema. Nails show no clubbing.   Scattered ecchymosis bilateral upper extremities  Jaundice  BLE discolored   Psychiatric: He has a normal mood and affect. His speech is normal and behavior is normal. Judgment and thought content normal. Cognition and memory are normal.   Nursing note and vitals reviewed.        Assessment:             * No active hospital problems. *    Past Medical History:   Diagnosis Date   • Atelectasis, left 12/9/2018   • Cerebrovascular disease, acute    • Chest pain    • Chronic atrial fibrillation (CMS/HCC)      ablation X 3; followed by Dr. Perez chronically anticoagulated with Coumadin   • Class 3 severe obesity with serious comorbidity and body mass index (BMI) of 40.0 to 44.9 in adult (CMS/HCC) 12/9/2018   • COPD (chronic obstructive pulmonary disease) (CMS/HCC)    • Diaphragm dysfunction 12/9/2018   • Glaucoma    • Hypertrophic cardiomyopathy (CMS/HCC)    • Intermittent palpitations    • Lung nodule 6/24/2019   • Mitral valve replaced    • Mixed hyperlipidemia    • Moderate persistent asthma without complication 12/9/2018   • Obstructive sleep apnea 12/9/2018   • Obstructive sleep apnea, adult    • Pacemaker    • Rash 6/24/2019    Arms and legs   • Restrictive lung disease 12/9/2018   • Stroke (CMS/HCC)     x2        Plan:        1. Acute on chronic diastolic CHF  2. Acute kidney injury on CKD4  3. Multifactorial anemia  4. Chronic anticoagulation  5. MINH  6. COPD  7. Hx CVA  8. Hypertrophic cardiomyopathy  9. Hx MVR  10. Atrial fibrillation  11. PATTERSON with cirrhosis  12. Hyperthyroidism  13. Thrombocytopenia, most likely secondary to liver disease and consumption due to acute illness     Continue current treatment. Monitor counts. Increase activity as tolerated. Aggressive therapies as  tolerated. Labs with HD. Continue blood pressure support as needed with levophed. Appreciate cardiology input. Hold coumadin for inr > 3.5.  HD per nephrology. Monitor platelets closely. Encourage compliance with fluid restriction.  Questions answered      Electronically signed by Maxwell Gallego MD on 12/31/2019 at 9:14 PM

## 2020-01-02 LAB
ANION GAP SERPL CALCULATED.3IONS-SCNC: 13 MMOL/L (ref 5–15)
APTT PPP: 65.1 SECONDS (ref 24.1–35)
BUN BLD-MCNC: 14 MG/DL (ref 8–23)
BUN/CREAT SERPL: 2.9 (ref 7–25)
CALCIUM SPEC-SCNC: 10.2 MG/DL (ref 8.6–10.5)
CHLORIDE SERPL-SCNC: 99 MMOL/L (ref 98–107)
CO2 SERPL-SCNC: 25 MMOL/L (ref 22–29)
CREAT BLD-MCNC: 4.84 MG/DL (ref 0.76–1.27)
GFR SERPL CREATININE-BSD FRML MDRD: 12 ML/MIN/1.73
GFR SERPL CREATININE-BSD FRML MDRD: ABNORMAL ML/MIN/{1.73_M2}
GLUCOSE BLD-MCNC: 87 MG/DL (ref 65–99)
INR PPP: 4.09 (ref 0.91–1.09)
MAGNESIUM SERPL-MCNC: 2.2 MG/DL (ref 1.6–2.4)
POTASSIUM BLD-SCNC: 4.3 MMOL/L (ref 3.5–5.2)
PROTHROMBIN TIME: 41.3 SECONDS (ref 11.9–14.6)
SODIUM BLD-SCNC: 137 MMOL/L (ref 136–145)

## 2020-01-02 PROCEDURE — 97530 THERAPEUTIC ACTIVITIES: CPT

## 2020-01-02 PROCEDURE — 85730 THROMBOPLASTIN TIME PARTIAL: CPT | Performed by: INTERNAL MEDICINE

## 2020-01-02 PROCEDURE — 97535 SELF CARE MNGMENT TRAINING: CPT

## 2020-01-02 PROCEDURE — 97116 GAIT TRAINING THERAPY: CPT

## 2020-01-02 PROCEDURE — 80048 BASIC METABOLIC PNL TOTAL CA: CPT | Performed by: INTERNAL MEDICINE

## 2020-01-02 PROCEDURE — 85610 PROTHROMBIN TIME: CPT | Performed by: INTERNAL MEDICINE

## 2020-01-02 PROCEDURE — 83735 ASSAY OF MAGNESIUM: CPT | Performed by: INTERNAL MEDICINE

## 2020-01-02 NOTE — PROGRESS NOTES
[facility]  RA Peterson M.D.  GLORIA Shafer APRN      Internal Medicine Progress Note    1/2/2020   11:30 AM    Name:  Pranay Gutierrez  MRN:    [id number]     Acct:     [id number]   Room:  [address] [place] [address] Day: 0     Admit Date: 12/12/2019  3:06 PM  PCP: Dwaine Wiley PA-C    Subjective:     C/C: Continued HD and rehabilitation    Interval History: Status: Improved. Up to chair. No family at bedside. Woke from sleep. Remains noncompliant with fluid and dietary restrictions. Anticipating HD later today. Denies pain. Progressing slowly with therapy. INR supratherapeutic.     Review of Systems   Constitution: Positive for malaise/fatigue. Negative for chills, decreased appetite and fever.   HENT: Negative for congestion, hoarse voice, nosebleeds and sore throat.    Eyes: Negative for blurred vision, discharge, pain and visual disturbance.   Cardiovascular: Negative for chest pain, dyspnea on exertion, irregular heartbeat, leg swelling, orthopnea and palpitations.   Respiratory: Negative for cough, shortness of breath, sputum production and wheezing.    Hematologic/Lymphatic: Negative for bleeding problem. Does not bruise/bleed easily.   Skin: Negative for dry skin, flushing, itching, poor wound healing, rash and suspicious lesions.        Significant ecchymosis   Musculoskeletal: Negative for arthritis, back pain, falls, joint pain, joint swelling, muscle weakness and myalgias.   Gastrointestinal: Negative for bloating, abdominal pain, change in bowel habit, diarrhea, flatus, heartburn, melena and nausea.   Genitourinary: Negative for frequency, hesitancy, incomplete emptying, pelvic pain and urgency.   Neurological: Negative for difficulty with concentration, disturbances in coordination, dizziness, headaches, numbness, paresthesias, tremors and weakness.   Psychiatric/Behavioral: Negative for altered mental status, hallucinations and memory loss. The patient  is not nervous/anxious.          Medications:     Allergies:   Allergies   Allergen Reactions   • Adhesive Tape Other (See Comments)   • Amoxicillin Other (See Comments)     unknkown   • Silicone Other (See Comments)     unknown   • Penicillins Hives   • Sulfamethoxazole-Trimethoprim Nausea Only       Current Meds:   Current Facility-Administered Medications:   •  acetaminophen (TYLENOL) tablet 500 mg, 500 mg, Oral, Q6H PRN, Maxwell Gallego MD  •  albumin human 25 % IV SOLN 12.5 g, 12.5 g, Intravenous, Q5 Min PRN, Renny Floyd MD  •  b complex-vitamin c-folic acid (NEPHRO-JENA) tablet 1 tablet, 1 tablet, Oral, Daily, Maxwell Gallego MD  •  bumetanide (BUMEX) 10 mg in sodium chloride 0.9 % 100 mL (0.1 mg/mL) infusion, 0.5 mg/hr, Intravenous, Continuous, Adriel Viera MD  •  dextrose (D50W) 25 g/ 50mL Intravenous Solution 25 g, 25 g, Intravenous, Q15 Min PRN, Maxwell Gallego MD  •  dextrose (GLUTOSE) oral gel 15 g, 15 g, Oral, Q15 Min PRN, Maxwell Gallego MD  •  epoetin haylee (EPOGEN,PROCRIT) injection 10,000 Units, 10,000 Units, Subcutaneous, Weekly, Sravan Adam MD  •  flecainide (TAMBOCOR) tablet 25 mg, 25 mg, Oral, Q12H, Maxwell Gallego MD  •  folic acid (FOLVITE) tablet 1 mg, 1 mg, Oral, Daily, Maxwell Gallego MD  •  glucagon (human recombinant) (GLUCAGEN DIAGNOSTIC) injection 1 mg, 1 mg, Subcutaneous, Q15 Min PRN, Maxwell Gallego MD  •  heparin (porcine) injection 2,000 Units, 2,000 Units, Intracatheter, PRN, Renny Floyd MD, 2,000 Units at 12/18/19 1220  •  heparin (porcine) injection 2,000 Units, 2,000 Units, Intracatheter, PRN, Renny Floyd MD, 2,000 Units at 12/18/19 1221  •  heparin (porcine) injection 4,100 Units, 4,100 Units, Intravenous, Once, Adriel Viera MD  •  heparin (porcine) injection 4,100 Units, 4,100 Units, Intravenous, Once, Adriel Viera MD  •  hypromellose (ISOPTO  TEARS) 0.5 % ophthalmic solution 1 drop, 1 drop, Both Eyes, Q4H PRN, Maxwell Gallego MD  •  lactulose solution 20 g, 20 g, Oral, Daily, Maxwell Gallego MD  •  latanoprost (XALATAN) 0.005 % ophthalmic solution 1 drop, 1 drop, Left Eye, Nightly, Maxwell Gallego MD  •  melatonin tablet 6 mg, 6 mg, Oral, Nightly, Maxwell Gallego MD  •  midodrine (PROAMATINE) tablet 10 mg, 10 mg, Oral, Q8H, Sravan Adam MD  •  norepinephrine (LEVOPHED) 8,000 mcg in sodium chloride 0.9 % 250 mL (32 mcg/mL) infusion, 0.02-0.3 mcg/kg/min, Intravenous, Titrated, Maxwell Gallego MD  •  ondansetron (ZOFRAN) tablet 4 mg, 4 mg, Oral, Q6H PRN **OR** ondansetron (ZOFRAN) injection 4 mg, 4 mg, Intravenous, Q6H PRN, Maxwell Gallego MD  •  pantoprazole (PROTONIX) EC tablet 40 mg, 40 mg, Oral, Q AM, Maxwell Gallego MD  •  riFAXIMin (XIFAXAN) tablet 550 mg, 550 mg, Oral, Q12H, Maxwell Gallego MD  •  rOPINIRole (REQUIP) tablet 0.25 mg, 0.25 mg, Oral, TID, Maxwell Gallego MD  •  sodium chloride nasal spray 1 spray, 1 spray, Each Nare, Q1H PRN, Maxwell Gallego MD  •  vitamin A & D ointment, , Topical, Q12H, Maxwell Gallego MD  •  Meseret's amazing butt cream, , Topical, PRN, Celina Cedeno, APRN  •  Meseret's amazing butt cream, , Topical, TID, Celina Cedeno, APRN  •  Meseret's amazing butt cream, , Topical, BID, Celina Cedeno, APRN  •  warfarin (COUMADIN) tablet 1 mg, 1 mg, Oral, Daily, Maxwell Gallego MD    Data:     Code Status:    There are no questions and answers to display.       Family History   Problem Relation Age of Onset   • Alzheimer's disease Mother    • Diabetes Father    • Stroke Father        Social History     Socioeconomic History   • Marital status:      Spouse name: Not on file   • Number of children: Not on file   • Years of education: Not on file   • Highest education level: Not on file   Tobacco Use   • Smoking status: Never  "Smoker   • Smokeless tobacco: Never Used   Substance and Sexual Activity   • Alcohol use: No   • Drug use: No   • Sexual activity: Defer       Vitals:  Ht 177.8 cm (70\")   Wt 133 kg (293 lb 6.4 oz)   BMI 42.10 kg/m²   T 97.3 P 61 R 20 BP 82/47 Sp02 95% (room air)  I/O (24Hr):  No intake or output data in the 24 hours ending 01/02/20 1130    Labs and imaging:      Lab Results (last 24 hours)     Procedure Component Value Units Date/Time    Basic Metabolic Panel [[id number]]  (Abnormal) Collected:  01/02/20 0502    Specimen:  Blood Updated:  01/02/20 0544     Glucose 87 mg/dL      BUN 14 mg/dL      Creatinine 4.84 mg/dL      Sodium 137 mmol/L      Potassium 4.3 mmol/L      Chloride 99 mmol/L      CO2 25.0 mmol/L      Calcium 10.2 mg/dL      eGFR   Amer --     Comment: <15 Indicative of kidney failure.        eGFR Non African Amer 12 mL/min/1.73      Comment: <15 Indicative of kidney failure.        BUN/Creatinine Ratio 2.9     Anion Gap 13.0 mmol/L     Narrative:       GFR Normal >60  Chronic Kidney Disease <60  Kidney Failure <15      Magnesium [[id number]]  (Normal) Collected:  01/02/20 0502    Specimen:  Blood Updated:  01/02/20 0544     Magnesium 2.2 mg/dL     aPTT [[id number]]  (Abnormal) Collected:  01/02/20 0502    Specimen:  Blood Updated:  01/02/20 0531     PTT 65.1 seconds     Protime-INR [[id number]]  (Abnormal) Collected:  01/02/20 0502    Specimen:  Blood Updated:  01/02/20 0531     Protime 41.3 Seconds      INR 4.09            Xr Chest 1 View    Result Date: 12/19/2019  Narrative: HISTORY: Verify PICC placement  CXR: A frontal view the chest obtained.  COMPARISON: 12/12/2019  FINDINGS: Left upper extremity PICC line is in place with the tip projecting over the SVC. There is a tunneled right internal jugular permacatheter. There is left subclavian cardiac pacer device. The prosthetic coronary valve with median sternotomy wires.  Cardiac silhouette is enlarged. Probable patchy left basilar atelectasis " with a questionable trace left pleural effusion. No pneumothorax.      Impression: 1. Left approximately PICC line tip projecting in the SVC. Additional lines described above. 2. Cardiomegaly with probable patchy left basilar atelectasis. This report was finalized on 12/19/2019 14:54 by Dr. Ivanna Eduardo MD.    Xr Chest 1 View    Result Date: 12/12/2019  Narrative: Exam:   XR CHEST 1 VW-   Date:  12/12/2019  History:  Male, age  64 years;confirmation of all lines (ETT, tracheostomy tube, central venous catheters) on arrival  COMPARISON:  Chest x-ray dated 12/05/2018.  Findings : There is a new central venous catheter, IJ approach, terminating over the mid to low SVC region. The left-sided cardiac device with leads project over the right atrium and right ventricle. Prior aortic valve replacement. Median sternotomy wires appear intact. The heart and mediastinum are unchanged in size. Obscuration of the left lung base, concerning for consolidative process.  The bones show no acute pathology.       Impression: Impression:  1.  Lines as described above. 2.  Obscuration of the left lung base, concerning for a left lower lobe consolidative process.  This report was finalized on 12/12/2019 20:39 by Dr. Sana Terrazas MD.      Physical Examination:        Physical Exam   Constitutional: He is oriented to person, place, and time. Vital signs are normal. He appears well-developed and well-nourished. He is cooperative.   HENT:   Head: Normocephalic and atraumatic.   Nose: Nose normal.   Mouth/Throat: Oropharynx is clear and moist.   Eyes: Pupils are equal, round, and reactive to light. Conjunctivae, EOM and lids are normal.   Neck: Trachea normal and normal range of motion. Neck supple.   Cardiovascular: Normal rate, regular rhythm and normal heart sounds.   Pulmonary/Chest: Effort normal and breath sounds normal.   Abdominal: Soft. Normal appearance and bowel sounds are normal.   Obese   Musculoskeletal: Normal range of  motion. He exhibits edema (diffuse).   Generalized weakness   Neurological: He is alert and oriented to person, place, and time. He has normal strength. No cranial nerve deficit or sensory deficit.   Skin: Skin is warm, dry and intact. No petechiae and no rash noted. No cyanosis or erythema. Nails show no clubbing.   Scattered ecchymosis bilateral upper extremities  Jaundice  BLE discolored   Psychiatric: He has a normal mood and affect. His speech is normal and behavior is normal. Judgment and thought content normal. Cognition and memory are normal.   Nursing note and vitals reviewed.        Assessment:             * No active hospital problems. *    Past Medical History:   Diagnosis Date   • Atelectasis, left 12/9/2018   • Cerebrovascular disease, acute    • Chest pain    • Chronic atrial fibrillation (CMS/HCC)      ablation X 3; followed by Dr. Perez chronically anticoagulated with Coumadin   • Class 3 severe obesity with serious comorbidity and body mass index (BMI) of 40.0 to 44.9 in adult (CMS/HCC) 12/9/2018   • COPD (chronic obstructive pulmonary disease) (CMS/HCC)    • Diaphragm dysfunction 12/9/2018   • Glaucoma    • Hypertrophic cardiomyopathy (CMS/HCC)    • Intermittent palpitations    • Lung nodule 6/24/2019   • Mitral valve replaced    • Mixed hyperlipidemia    • Moderate persistent asthma without complication 12/9/2018   • Obstructive sleep apnea 12/9/2018   • Obstructive sleep apnea, adult    • Pacemaker    • Rash 6/24/2019    Arms and legs   • Restrictive lung disease 12/9/2018   • Stroke (CMS/HCC)     x2        Plan:        1. Acute on chronic diastolic CHF  2. Acute kidney injury on CKD4  3. Multifactorial anemia  4. Chronic anticoagulation  5. MINH  6. COPD  7. Hx CVA  8. Hypertrophic cardiomyopathy  9. Hx MVR  10. Atrial fibrillation  11. PATTERSON with cirrhosis  12. Hyperthyroidism  13. Thrombocytopenia, most likely secondary to liver disease and consumption due to acute illness  14. Supratherapeutic  INR     Continue current treatment. Monitor counts. Increase activity as tolerated. Aggressive therapies as tolerated. Labs with HD. Continue blood pressure support as needed with levophed. Appreciate cardiology input. Hold coumadin for inr > 3.5.  HD per nephrology. Monitor platelets closely. Encourage compliance with fluid restriction.       Electronically signed by GLORIA Huggins on 1/2/2020 at 11:30 AM   I have discussed the care of Pranay Gutierrez, including pertinent history and exam findings, with the nurse practitioner.    I have seen and examined the patient and the key elements of all parts of the encounter have been performed by me.  I agree with the assessment, plan and orders as documented by GLORIA Shafer, after I modified the exam findings and the plan of treatments and the final version is my approved version of the assessment.        Electronically signed by Maxwell Gallego MD on 1/2/2020 at 11:21 PM

## 2020-01-02 NOTE — PROGRESS NOTES
Nephrology ([facility]) Progress Note      Patient:  Pranay Gutierrez  YOB: 1955  Date of Service: 1/1/2020  MRN: [id number]   Acct: [id number]   Primary Care Physician: Dwaine Wiley PA-C  Advance Directive:   There are no questions and answers to display.     Admit Date: 12/12/2019       Hospital Day: 0  Referring Provider: Maxwell Gallego MD      Patient personally seen and examined.  Complete chart including Consults, Notes, Operative Reports, Labs, Cardiology, and Radiology studies reviewed as able.        Subjective:  Pranay Gutierrez is a 64 y.o. male  whom we were consulted for SARAH on HD.  First HD 10/27.  Also required CVVHD for a time.  Transferred to LTACH 12/12 for continued care.  Pt recalls no prior nephrology issues prior to SARAH requiring HD.  Notes/labs all reviewed as available.  Recalls no recent issues with dialysis.  Edema improved with RRT.  Denies cp/soa/n/v/cough.       Today, no new events.  Off Levophed drip at this time. Tolerating HD.  Nursing notes continued noncompliance with fluid restriction      Temp- 98.7  Pulse- 67  Resp- 20  BP- 70/34  O2%- 99        Allergies:  Adhesive tape; Amoxicillin; Silicone; Penicillins; and Sulfamethoxazole-trimethoprim    Home Meds:  Medications Prior to Admission   Medication Sig Dispense Refill Last Dose   • ASMANEX 120 METERED DOSES 220 MCG/INH inhaler INHALE TWO PUFFS BY MOUTH TWICE A DAY 1 inhaler 11 Taking   • atorvastatin (LIPITOR) 20 MG tablet Take 20 mg by mouth Daily.   Taking   • citalopram (CeleXA) 20 MG tablet Take 20 mg by mouth Daily.   Taking   • desonide (DESOWEN) 0.05 % cream Apply 1 application topically 2 (Two) Times a Day.   Taking   • dofetilide (TIKOSYN) 250 MCG capsule Take 1 capsule by mouth Every 12 (Twelve) Hours. (Patient taking differently: Take 500 mcg by mouth Every 12 (Twelve) Hours.) 60 capsule 11 Taking   • furosemide (LASIX) 80 MG tablet Take 80 mg by mouth Daily.   Taking    • ipratropium (ATROVENT) 0.02 % nebulizer solution Take 500 mcg by nebulization 4 (Four) Times a Day.   Taking   • latanoprost (XALATAN) 0.005 % ophthalmic solution 1 drop Every Night.   Taking   • levalbuterol (XOPENEX HFA) 45 MCG/ACT inhaler Inhale 1-2 puffs Every 4 (Four) Hours As Needed for Wheezing.   Taking   • levalbuterol (XOPENEX) 1.25 MG/3ML nebulizer solution Take 3 mL by nebulization 3 (Three) Times a Day As Needed for Wheezing.   Taking   • magnesium oxide (MAGOX) 400 (241.3 Mg) MG tablet tablet Take 400 mg by mouth 2 (Two) Times a Day.   Taking   • Melatonin 10 MG tablet Take 10 mg by mouth Every Night.   Taking   • metOLazone (ZAROXOLYN) 2.5 MG tablet Take 2.5 mg by mouth Daily. 1 TABLET PO EVERY Monday, Wednesday & Friday   Taking   • metoprolol succinate XL (TOPROL-XL) 50 MG 24 hr tablet Take 75 mg by mouth 2 (Two) Times a Day.   Taking   • Metoprolol Tartrate 75 MG tablet Take 75 mg by mouth 2 (Two) Times a Day.   Taking   • Mometasone Furoate (ASMANEX HFA) 200 MCG/ACT aerosol Inhale 2 puffs 2 (Two) Times a Day.   Taking   • potassium chloride (K-DUR) 10 MEQ CR tablet Take 10 mEq by mouth 2 (Two) Times a Day.   Taking   • potassium chloride (K-DUR,KLOR-CON) 20 MEQ CR tablet Take 20 mEq by mouth 2 (Two) Times a Day. 30 mg am & 30 mg pm   Taking   • propylthiouracil (PTU) 50 MG tablet Take 50 mg by mouth Daily.   Taking   • spironolactone (ALDACTONE) 25 MG tablet Take 25 mg by mouth Daily.   Taking   • warfarin (COUMADIN) 2 MG tablet Take 2.5 mg by mouth Daily.   Taking       Medicines:  Current Facility-Administered Medications   Medication Dose Route Frequency Provider Last Rate Last Dose   • acetaminophen (TYLENOL) tablet 500 mg  500 mg Oral Q6H PRN Maxwell Gallego MD       • albumin human 25 % IV SOLN 12.5 g  12.5 g Intravenous Q5 Min PRN Renny Floyd MD       • b complex-vitamin c-folic acid (NEPHRO-JENA) tablet 1 tablet  1 tablet Oral Daily Maxwell Gallego MD        • bumetanide (BUMEX) 10 mg in sodium chloride 0.9 % 100 mL (0.1 mg/mL) infusion  0.5 mg/hr Intravenous Continuous Adriel Viera MD       • dextrose (D50W) 25 g/ 50mL Intravenous Solution 25 g  25 g Intravenous Q15 Min PRN Maxwell Gallego MD       • dextrose (GLUTOSE) oral gel 15 g  15 g Oral Q15 Min PRN Maxwell Gallego MD       • epoetin haylee (EPOGEN,PROCRIT) injection 10,000 Units  10,000 Units Subcutaneous Weekly Sravan Adam MD       • flecainide (TAMBOCOR) tablet 25 mg  25 mg Oral Q12H Maxwell Gallego MD       • folic acid (FOLVITE) tablet 1 mg  1 mg Oral Daily Maxwell Gallego MD       • glucagon (human recombinant) (GLUCAGEN DIAGNOSTIC) injection 1 mg  1 mg Subcutaneous Q15 Min PRN Maxwell Gallego MD       • heparin (porcine) injection 2,000 Units  2,000 Units Intracatheter PRN Renny Floyd MD   2,000 Units at 12/18/19 1220   • heparin (porcine) injection 2,000 Units  2,000 Units Intracatheter PRN Renny Floyd MD   2,000 Units at 12/18/19 1221   • heparin (porcine) injection 4,100 Units  4,100 Units Intravenous Once Adriel Viera MD       • heparin (porcine) injection 4,100 Units  4,100 Units Intravenous Once Adriel Viera MD       • hypromellose (ISOPTO TEARS) 0.5 % ophthalmic solution 1 drop  1 drop Both Eyes Q4H PRN Maxwell Gallego MD       • lactulose solution 20 g  20 g Oral Daily Maxwell Gallego MD       • latanoprost (XALATAN) 0.005 % ophthalmic solution 1 drop  1 drop Left Eye Nightly Maxwell Gallego MD       • melatonin tablet 6 mg  6 mg Oral Nightly Maxwell Gallego MD       • midodrine (PROAMATINE) tablet 10 mg  10 mg Oral Q8H Sravan Adam MD       • norepinephrine (LEVOPHED) 8,000 mcg in sodium chloride 0.9 % 250 mL (32 mcg/mL) infusion  0.02-0.3 mcg/kg/min Intravenous Titrated Maxwell Gallego MD       • ondansetron (ZOFRAN) tablet 4 mg  4 mg Oral Q6H PRN Julián  Maxwell Ho MD        Or   • ondansetron (ZOFRAN) injection 4 mg  4 mg Intravenous Q6H PRN Maxwell Gallego MD       • pantoprazole (PROTONIX) EC tablet 40 mg  40 mg Oral Q AM Maxwell Gallego MD       • riFAXIMin (XIFAXAN) tablet 550 mg  550 mg Oral Q12H Maxwell Gallego MD       • rOPINIRole (REQUIP) tablet 0.25 mg  0.25 mg Oral TID Maxwell Gallego MD       • sodium chloride nasal spray 1 spray  1 spray Each Nare Q1H PRN Maxwell Gallego MD       • vitamin A & D ointment   Topical Q12H Maxwell Gallego MD       • Meseret's amazing butt cream   Topical PRN Celina Cedeno, APRN       • Meseret's amazing butt cream   Topical TID Celina Cedeno, APRN       • Meseret's amazing butt cream   Topical BID Celina Cedeno, APRN       • warfarin (COUMADIN) tablet 1 mg  1 mg Oral Daily Maxwell Gallego MD           Past Medical History:  Past Medical History:   Diagnosis Date   • Atelectasis, left 12/9/2018   • Cerebrovascular disease, acute    • Chest pain    • Chronic atrial fibrillation (CMS/HCC)      ablation X 3; followed by Dr. Perez chronically anticoagulated with Coumadin   • Class 3 severe obesity with serious comorbidity and body mass index (BMI) of 40.0 to 44.9 in adult (CMS/HCC) 12/9/2018   • COPD (chronic obstructive pulmonary disease) (CMS/HCC)    • Diaphragm dysfunction 12/9/2018   • Glaucoma    • Hypertrophic cardiomyopathy (CMS/HCC)    • Intermittent palpitations    • Lung nodule 6/24/2019   • Mitral valve replaced    • Mixed hyperlipidemia    • Moderate persistent asthma without complication 12/9/2018   • Obstructive sleep apnea 12/9/2018   • Obstructive sleep apnea, adult    • Pacemaker    • Rash 6/24/2019    Arms and legs   • Restrictive lung disease 12/9/2018   • Stroke (CMS/HCC)     x2       Past Surgical History:  Past Surgical History:   Procedure Laterality Date   • APPENDECTOMY     • CARDIAC ABLATION  01/03/2012   • CORONARY ARTERY BYPASS GRAFT      • HEMORROIDECTOMY     • MITRAL VALVE REPLACEMENT     • PACEMAKER IMPLANTATION      Cardiac Pacemaker Insertion   • VASECTOMY         Family History  Family History   Problem Relation Age of Onset   • Alzheimer's disease Mother    • Diabetes Father    • Stroke Father        Social History  Social History     Socioeconomic History   • Marital status:      Spouse name: Not on file   • Number of children: Not on file   • Years of education: Not on file   • Highest education level: Not on file   Tobacco Use   • Smoking status: Never Smoker   • Smokeless tobacco: Never Used   Substance and Sexual Activity   • Alcohol use: No   • Drug use: No   • Sexual activity: Defer         Review of Systems:  History obtained from chart review and the patient  General ROS: No fever or chills  Respiratory ROS: No cough, shortness of breath, wheezing  Cardiovascular ROS: No chest pain or palpitations  Gastrointestinal ROS: No abdominal pain or melena  Genito-Urinary ROS: No dysuria or hematuria    Objective:  No data found.  No intake or output data in the 24 hours ending 01/01/20 2214  General: awake/alert   Chest:  clear to auscultation bilaterally without respiratory distress  CVS: regular rate and rhythm  Abdominal: soft, nontender, positive bowel sounds  Extremities: ble edema  Skin: warm/dry with ble stasis changes      Labs:  Results from last 7 days   Lab Units 12/30/19  0459 12/27/19  0707   WBC 10*3/mm3 6.48 6.81   HEMOGLOBIN g/dL 8.7* 7.9*   HEMATOCRIT % 26.4* 24.1*   PLATELETS 10*3/mm3 75* 63*         Results from last 7 days   Lab Units 01/01/20  0503 12/31/19  0529 12/30/19  0459   SODIUM mmol/L 140 135* 135*   POTASSIUM mmol/L 4.1 4.2 4.0   CHLORIDE mmol/L 101 96* 95*   CO2 mmol/L 26.0 26.0 25.0   BUN mg/dL 10 13 17   CREATININE mg/dL 3.85* 4.38* 5.69*   CALCIUM mg/dL 9.9 10.0 10.3   GLUCOSE mg/dL 96 129* 112*       Radiology:   Imaging Results (Last 72 Hours)     ** No results found for the last 72 hours. **           Culture:  No results found for: BLOODCX, URINECX, WOUNDCX, MRSACX, RESPCX, STOOLCX      Assessment   SARAH  CKD 3  Hepatorenal syndrome  Anasarca  Acute/chronic diastolic CHF  Anemia/thrombocytopenia    Plan:  D/w pt/nursing/family  HD MTTS given volume overload and HD holiday schedule  Monitor labs  Wean levophed as tolerated  bumex drip has subjectively improved UOP, continue for today  Encouraged compliance with fluid restriction    Renny Floyd MD  1/1/2020  10:17 PM

## 2020-01-03 LAB
ANION GAP SERPL CALCULATED.3IONS-SCNC: 14 MMOL/L (ref 5–15)
APTT PPP: 65.6 SECONDS (ref 24.1–35)
BUN BLD-MCNC: 17 MG/DL (ref 8–23)
BUN/CREAT SERPL: 3 (ref 7–25)
CALCIUM SPEC-SCNC: 10.3 MG/DL (ref 8.6–10.5)
CHLORIDE SERPL-SCNC: 96 MMOL/L (ref 98–107)
CO2 SERPL-SCNC: 25 MMOL/L (ref 22–29)
CREAT BLD-MCNC: 5.59 MG/DL (ref 0.76–1.27)
GFR SERPL CREATININE-BSD FRML MDRD: 10 ML/MIN/1.73
GFR SERPL CREATININE-BSD FRML MDRD: ABNORMAL ML/MIN/{1.73_M2}
GLUCOSE BLD-MCNC: 85 MG/DL (ref 65–99)
INR PPP: 4.56 (ref 0.91–1.09)
MAGNESIUM SERPL-MCNC: 2.2 MG/DL (ref 1.6–2.4)
POTASSIUM BLD-SCNC: 4.3 MMOL/L (ref 3.5–5.2)
PROTHROMBIN TIME: 45 SECONDS (ref 11.9–14.6)
SODIUM BLD-SCNC: 135 MMOL/L (ref 136–145)

## 2020-01-03 PROCEDURE — 83735 ASSAY OF MAGNESIUM: CPT | Performed by: INTERNAL MEDICINE

## 2020-01-03 PROCEDURE — 85730 THROMBOPLASTIN TIME PARTIAL: CPT | Performed by: INTERNAL MEDICINE

## 2020-01-03 PROCEDURE — 25010000002 HEPARIN (PORCINE) PER 1000 UNITS: Performed by: INTERNAL MEDICINE

## 2020-01-03 PROCEDURE — 85610 PROTHROMBIN TIME: CPT | Performed by: INTERNAL MEDICINE

## 2020-01-03 PROCEDURE — 80048 BASIC METABOLIC PNL TOTAL CA: CPT | Performed by: INTERNAL MEDICINE

## 2020-01-03 NOTE — PROGRESS NOTES
"[facility]  Gabriel Gallego M.D.  SHARRON Gallego M.D.  GLORIA Shafer APRN      Internal Medicine Progress Note    1/3/2020   11:43 AM    Name:  Pranay Gutierrez  MRN:    [id number]     Acct:     [id number]   Room:  [address] [place] [address] Day: 0     Admit Date: 12/12/2019  3:06 PM  PCP: Dwaine Wiley PA-C    Subjective:     C/C: Continued HD and rehabilitation    Interval History: Status: Improved. Resting in bed.  No family at bedside.  Remains noncompliant with fluid and dietary restrictions. INR supratherapeutic. Slow progress with therapy. Seen on HD. Reports episode of nausea and vomiting this morning. Long discussion with wife at the nurse's station - similar to the multiple conversations we've had in the past several days.   Patient asking \"about Pranay\". I again went through his multiple medical problems including his recent hospitalizations. Again, reiterated the need for compliance with dietary and fluid restrictions - wife does not appear to understand. She told me that she \"just brought him a lemonade\" the other day and that it's \"sugar free.\" I discussed with her that no matter what the content, fluid is fluid and needs to be limited. Wife tells me the \"problem with dialysis is his blood pressure\" and I explained that is true, but that it's slightly more complicated - he can't tolerate goal dialysis due to his blood pressure and he continues to drink more than allowed which makes the next dialysis even more difficult with more fluid that cannot be taken off. Wife concerned about \"what happens when he goes home.\"    Review of Systems   Constitution: Positive for malaise/fatigue. Negative for chills, decreased appetite and fever.   HENT: Negative for congestion, hoarse voice, nosebleeds and sore throat.    Eyes: Negative for blurred vision, discharge, pain and visual disturbance.   Cardiovascular: Negative for chest pain, dyspnea on exertion, irregular heartbeat, leg swelling, " orthopnea and palpitations.   Respiratory: Negative for cough, shortness of breath, sputum production and wheezing.    Hematologic/Lymphatic: Negative for bleeding problem. Does not bruise/bleed easily.   Skin: Negative for dry skin, flushing, itching, poor wound healing, rash and suspicious lesions.        Significant ecchymosis   Musculoskeletal: Negative for arthritis, back pain, falls, joint pain, joint swelling, muscle weakness and myalgias.   Gastrointestinal: Negative for bloating, abdominal pain, change in bowel habit, diarrhea, flatus, heartburn, melena and nausea.   Genitourinary: Negative for frequency, hesitancy, incomplete emptying, pelvic pain and urgency.   Neurological: Negative for difficulty with concentration, disturbances in coordination, dizziness, headaches, numbness, paresthesias, tremors and weakness.   Psychiatric/Behavioral: Negative for altered mental status, hallucinations and memory loss. The patient is not nervous/anxious.          Medications:     Allergies:   Allergies   Allergen Reactions   • Adhesive Tape Other (See Comments)   • Amoxicillin Other (See Comments)     unknkown   • Silicone Other (See Comments)     unknown   • Penicillins Hives   • Sulfamethoxazole-Trimethoprim Nausea Only       Current Meds:   Current Facility-Administered Medications:   •  acetaminophen (TYLENOL) tablet 500 mg, 500 mg, Oral, Q6H PRN, Maxwell Gallego MD  •  albumin human 25 % IV SOLN 12.5 g, 12.5 g, Intravenous, Q5 Min PRN, Renny Floyd MD  •  b complex-vitamin c-folic acid (NEPHRO-JENA) tablet 1 tablet, 1 tablet, Oral, Daily, Maxwell Gallego MD  •  bumetanide (BUMEX) 10 mg in sodium chloride 0.9 % 100 mL (0.1 mg/mL) infusion, 0.5 mg/hr, Intravenous, Continuous, Adriel Viera MD  •  dextrose (D50W) 25 g/ 50mL Intravenous Solution 25 g, 25 g, Intravenous, Q15 Min PRN, Maxwell Gallego MD  •  dextrose (GLUTOSE) oral gel 15 g, 15 g, Oral, Q15 Min PRN, Julián  Maxwell Ho MD  •  epoetin haylee (EPOGEN,PROCRIT) injection 10,000 Units, 10,000 Units, Subcutaneous, Weekly, Sravan Adam MD  •  flecainide (TAMBOCOR) tablet 25 mg, 25 mg, Oral, Q12H, Maxwell Gallego MD  •  folic acid (FOLVITE) tablet 1 mg, 1 mg, Oral, Daily, Maxwell Gallego MD  •  glucagon (human recombinant) (GLUCAGEN DIAGNOSTIC) injection 1 mg, 1 mg, Subcutaneous, Q15 Min PRN, Maxwell Gallego MD  •  heparin (porcine) injection 2,000 Units, 2,000 Units, Intracatheter, PRN, Renny Floyd MD, 2,000 Units at 12/18/19 1220  •  heparin (porcine) injection 2,000 Units, 2,000 Units, Intracatheter, PRN, Renny Floyd MD, 2,000 Units at 12/18/19 1221  •  heparin (porcine) injection 4,100 Units, 4,100 Units, Intravenous, Once, Adriel Viera MD  •  heparin (porcine) injection 4,100 Units, 4,100 Units, Intravenous, Once, Adriel Viera MD  •  hypromellose (ISOPTO TEARS) 0.5 % ophthalmic solution 1 drop, 1 drop, Both Eyes, Q4H PRN, Maxwell Gallego MD  •  lactulose solution 20 g, 20 g, Oral, Daily, Maxwell Gallego MD  •  latanoprost (XALATAN) 0.005 % ophthalmic solution 1 drop, 1 drop, Left Eye, Nightly, Maxwell Gallego MD  •  melatonin tablet 6 mg, 6 mg, Oral, Nightly, Maxwell Gallego MD  •  midodrine (PROAMATINE) tablet 10 mg, 10 mg, Oral, Q8H, Sravan Adam MD  •  norepinephrine (LEVOPHED) 8,000 mcg in sodium chloride 0.9 % 250 mL (32 mcg/mL) infusion, 0.02-0.3 mcg/kg/min, Intravenous, Titrated, Maxwell Gallego MD  •  ondansetron (ZOFRAN) tablet 4 mg, 4 mg, Oral, Q6H PRN **OR** ondansetron (ZOFRAN) injection 4 mg, 4 mg, Intravenous, Q6H PRN, Maxwell Gallego MD  •  pantoprazole (PROTONIX) EC tablet 40 mg, 40 mg, Oral, Q AM, Maxwell Gallego MD  •  riFAXIMin (XIFAXAN) tablet 550 mg, 550 mg, Oral, Q12H, Maxwell Gallego MD  •  rOPINIRole (REQUIP) tablet 0.25 mg, 0.25 mg, Oral, TID, Maxwell Gallego,  "MD  •  sodium chloride nasal spray 1 spray, 1 spray, Each Nare, Q1H PRN, Maxwell Gallego MD  •  vitamin A & D ointment, , Topical, Q12H, Maxwell Gallego MD  •  Meseret's amazing butt cream, , Topical, PRN, Celina Cedeno, APRN  •  Meseret's amazing butt cream, , Topical, TID, Celina Cedeno, APRN  •  Meseret's amazing butt cream, , Topical, BID, Celina Cedeno, APRN  •  warfarin (COUMADIN) tablet 1 mg, 1 mg, Oral, Daily, Maxwell Gallego MD    Data:     Code Status:    There are no questions and answers to display.       Family History   Problem Relation Age of Onset   • Alzheimer's disease Mother    • Diabetes Father    • Stroke Father        Social History     Socioeconomic History   • Marital status:      Spouse name: Not on file   • Number of children: Not on file   • Years of education: Not on file   • Highest education level: Not on file   Tobacco Use   • Smoking status: Never Smoker   • Smokeless tobacco: Never Used   Substance and Sexual Activity   • Alcohol use: No   • Drug use: No   • Sexual activity: Defer       Vitals:  Ht 177.8 cm (70\")   Wt 133 kg (293 lb 6.4 oz)   BMI 42.10 kg/m²   T 97.9 P 61 R 19 BP 86/60 Sp02 97% (room air)  I/O (24Hr):  No intake or output data in the 24 hours ending 01/03/20 1143    Labs and imaging:      Lab Results (last 24 hours)     Procedure Component Value Units Date/Time    aPTT [[id number]]  (Abnormal) Collected:  01/03/20 0500    Specimen:  Blood Updated:  01/03/20 0633     PTT 65.6 seconds     Protime-INR [[id number]]  (Abnormal) Collected:  01/03/20 0500    Specimen:  Blood Updated:  01/03/20 0633     Protime 45.0 Seconds      INR 4.56    Basic Metabolic Panel [[id number]]  (Abnormal) Collected:  01/03/20 0500    Specimen:  Blood Updated:  01/03/20 0534     Glucose 85 mg/dL      BUN 17 mg/dL      Creatinine 5.59 mg/dL      Sodium 135 mmol/L      Potassium 4.3 mmol/L      Chloride 96 mmol/L      CO2 25.0 mmol/L      Calcium 10.3 mg/dL  "     eGFR   Amer --     Comment: <15 Indicative of kidney failure.        eGFR Non African Amer 10 mL/min/1.73      Comment: <15 Indicative of kidney failure.        BUN/Creatinine Ratio 3.0     Anion Gap 14.0 mmol/L     Narrative:       GFR Normal >60  Chronic Kidney Disease <60  Kidney Failure <15      Magnesium [[id number]]  (Normal) Collected:  01/03/20 0500    Specimen:  Blood Updated:  01/03/20 0534     Magnesium 2.2 mg/dL             Xr Chest 1 View    Result Date: 12/19/2019  Narrative: HISTORY: Verify PICC placement  CXR: A frontal view the chest obtained.  COMPARISON: 12/12/2019  FINDINGS: Left upper extremity PICC line is in place with the tip projecting over the SVC. There is a tunneled right internal jugular permacatheter. There is left subclavian cardiac pacer device. The prosthetic coronary valve with median sternotomy wires.  Cardiac silhouette is enlarged. Probable patchy left basilar atelectasis with a questionable trace left pleural effusion. No pneumothorax.      Impression: 1. Left approximately PICC line tip projecting in the SVC. Additional lines described above. 2. Cardiomegaly with probable patchy left basilar atelectasis. This report was finalized on 12/19/2019 14:54 by Dr. Ivanna Eduardo MD.    Xr Chest 1 View    Result Date: 12/12/2019  Narrative: Exam:   XR CHEST 1 VW-   Date:  12/12/2019  History:  Male, age  64 years;confirmation of all lines (ETT, tracheostomy tube, central venous catheters) on arrival  COMPARISON:  Chest x-ray dated 12/05/2018.  Findings : There is a new central venous catheter, IJ approach, terminating over the mid to low SVC region. The left-sided cardiac device with leads project over the right atrium and right ventricle. Prior aortic valve replacement. Median sternotomy wires appear intact. The heart and mediastinum are unchanged in size. Obscuration of the left lung base, concerning for consolidative process.  The bones show no acute pathology.        Impression: Impression:  1.  Lines as described above. 2.  Obscuration of the left lung base, concerning for a left lower lobe consolidative process.  This report was finalized on 12/12/2019 20:39 by Dr. Sana Terrazas MD.      Physical Examination:        Physical Exam   Constitutional: He is oriented to person, place, and time. Vital signs are normal. He appears well-developed and well-nourished. He is cooperative.   HENT:   Head: Normocephalic and atraumatic.   Nose: Nose normal.   Mouth/Throat: Oropharynx is clear and moist.   Eyes: Pupils are equal, round, and reactive to light. Conjunctivae, EOM and lids are normal.   Neck: Trachea normal and normal range of motion. Neck supple.   Cardiovascular: Normal rate, regular rhythm and normal heart sounds.   Pulmonary/Chest: Effort normal and breath sounds normal.   Abdominal: Soft. Normal appearance and bowel sounds are normal.   Obese   Musculoskeletal: Normal range of motion. He exhibits edema (diffuse).   Generalized weakness   Neurological: He is alert and oriented to person, place, and time. He has normal strength. No cranial nerve deficit or sensory deficit.   Skin: Skin is warm, dry and intact. No petechiae and no rash noted. No cyanosis or erythema. Nails show no clubbing.   Scattered ecchymosis bilateral upper extremities  Jaundice  BLE discolored   Psychiatric: He has a normal mood and affect. His speech is normal and behavior is normal. Judgment and thought content normal. Cognition and memory are normal.   Nursing note and vitals reviewed.        Assessment:             * No active hospital problems. *    Past Medical History:   Diagnosis Date   • Atelectasis, left 12/9/2018   • Cerebrovascular disease, acute    • Chest pain    • Chronic atrial fibrillation (CMS/HCC)      ablation X 3; followed by Dr. Perez chronically anticoagulated with Coumadin   • Class 3 severe obesity with serious comorbidity and body mass index (BMI) of 40.0 to 44.9 in adult  (CMS/HCC) 12/9/2018   • COPD (chronic obstructive pulmonary disease) (CMS/HCC)    • Diaphragm dysfunction 12/9/2018   • Glaucoma    • Hypertrophic cardiomyopathy (CMS/HCC)    • Intermittent palpitations    • Lung nodule 6/24/2019   • Mitral valve replaced    • Mixed hyperlipidemia    • Moderate persistent asthma without complication 12/9/2018   • Obstructive sleep apnea 12/9/2018   • Obstructive sleep apnea, adult    • Pacemaker    • Rash 6/24/2019    Arms and legs   • Restrictive lung disease 12/9/2018   • Stroke (CMS/HCC)     x2        Plan:        1. Acute on chronic diastolic CHF  2. Acute kidney injury on CKD4  3. Multifactorial anemia  4. Chronic anticoagulation  5. MINH  6. COPD  7. Hx CVA  8. Hypertrophic cardiomyopathy  9. Hx MVR  10. Atrial fibrillation  11. PATTERSON with cirrhosis  12. Hyperthyroidism  13. Thrombocytopenia, most likely secondary to liver disease and consumption due to acute illness  14. Supratherapeutic INR     Continue current treatment. Monitor counts. Increase activity as tolerated. Aggressive therapies as tolerated. Labs with HD. Continue blood pressure support as needed with levophed. Appreciate cardiology input. Hold coumadin for inr > 3.5.  HD per nephrology. Monitor platelets closely. Encourage compliance with fluid restriction.   >30 minutes spent in discussion with patient, family and staff.       Electronically signed by GLORIA Huggins on 1/3/2020 at 11:43 AM   I have discussed the care of Pranay Gutierrez, including pertinent history and exam findings, with the nurse practitioner.    I have seen and examined the patient and the key elements of all parts of the encounter have been performed by me.  I agree with the assessment, plan and orders as documented by GLORIA Shafer, after I modified the exam findings and the plan of treatments and the final version is my approved version of the assessment.        Electronically signed by Maxwell Gallego MD on 1/3/2020  at 11:16 PM

## 2020-01-03 NOTE — PROGRESS NOTES
Adult Nutrition  Assessment/PES    Patient Name:  Pranay Gutierrez  YOB: 1955  MRN: [id number]  Admit Date:  12/12/2019    Assessment Date:  1/3/2020    Comments:  Pt continues to eat okay. Wife present in room. Education was provided re fluid restriction, low sodium, renal diet. Wife seemed very overwhelmed, we discussed what is considered to be a fluid and the importance of the fluid restriction. Education was provided for what foods Mr. Gutierrez could have as well as the background of why he was on this specific diet. Provided handouts for he and his wife to be able to review and encouraged the wife to ask questions. Pts wife expressed lots of concern with home care s/p discharge and with the diet restrictions and unsure if her  would be willing to comply. Encouraged dietary compliance. Will continue to follow.     Reason for Assessment     Row Name 01/03/20 1514          Reason for Assessment    Reason For Assessment  follow-up protocol         Nutrition/Diet History     Row Name 01/03/20 1514          Nutrition/Diet History    Typical Food/Fluid Intake  Pt continues to eat okay. Wife present in room. Education was provided re fluid restriction, low sodium, renal diet. Wife seemed very overwhelmed, we discussed what is considered to be a fluid and the importance of the fluid restriction. Education was provided for what foods Mr. Gutierrez could have as well as the background of why he was on this specific diet. Provided handouts for he and his wife to be able to review and encouraged the wife to ask questions. Pts wife expressed lots of concern with home care s/p discharge and with the diet restrictions and unsure if her  would be willing to comply.          Anthropometrics     Row Name 01/03/20 1520          Usual Body Weight (UBW)    Weight Loss Time Frame  28# loss compared to admission wt        Body Mass Index (BMI)    BMI Assessment  BMI 40 or greater: obesity grade III          Labs/Tests/Procedures/Meds     Row Name 01/03/20 1521          Labs/Procedures/Meds    Lab Results Reviewed  reviewed, pertinent     Lab Results Comments  Cr; GFR 10        Diagnostic Tests/Procedures    Diagnostic Test/Procedure Reviewed  reviewed     Diagnostic Test/Procedures Comments  routine dialysis        Medications    Pertinent Medications Reviewed  reviewed     Pertinent Medications Comments  protonix         Physical Findings     [facility] Name 01/03/20 1522          Physical Findings    Overall Physical Appearance  obese;edematous;on oxygen therapy     Gastrointestinal  constipation BM 12/31     Skin  edema;other (see comments) 4+ generalized edema; britt 17/18           Nutrition Prescription Ordered     [facility] Name 01/03/20 1522          Nutrition Prescription PO    Current PO Diet  Regular     Supplement  Magic Cup     Supplement Frequency  Daily     Common Modifiers  Cardiac;Renal;Fluid Restriction     Fluid Restriction mL per Day  1000 mL         Evaluation of Received Nutrient/Fluid Intake     [facility] Name 01/03/20 1522          Nutrient/Fluid Evaluation    Number of Days Evaluated  3 days     Additional Documentation  Fluid Intake Evaluation (Group)        Fluid Intake Evaluation    Oral Fluid (mL)  940     IV Fluid (mL)  198        PO Evaluation    Number of Days PO Intake Evaluated  2 days     Number of Meals  5     % PO Intake  63               Problem/Interventions:  Problem 1     [facility] Name 01/03/20 1525          Nutrition Diagnoses Problem 1    Problem 1  Increased Nutrient Needs     Macronutrient  Kcal;Protein     Etiology (related to)  Medical Diagnosis     Renal  SARAH;CKD;Hemodialysis     Signs/Symptoms (evidenced by)  PO Intake     Percent (%) intake recorded  63 %     Over number of meals  5         Problem 2     [facility] Name 01/03/20 1525          Nutrition Diagnoses Problem 2    Problem 2  Knowledge Deficit     Etiology (related to)  Medical Diagnosis     Renal  CKD;Hemodialysis 1000mL fluid  restriction     Signs/Symptoms (evidenced by)  Reported Information Deficit;Demonstrated Information Deficit;Report/Observation     Other Comment  Education provided to pts wife (pt slept during edu) re fluid restriction, renal diet, and low Na.              Intervention Goal     Row Name 01/03/20 1527          Intervention Goal    General  Improved nutrition related lab(s);Maintain nutrition;Reduce/improve symptoms;Meet nutritional needs for age/condition;Disease management/therapy     PO  Increase intake;Continue positive trend;Meet estimated needs     Weight  Appropriate weight loss         Nutrition Intervention     Row Name 01/03/20 1527          Nutrition Intervention    RD/Tech Action  Follow Tx progress;Care plan reviewd;Encourage intake;Advise available snack;Advise alternate selection           Education/Evaluation     Row Name 01/03/20 1527          Education    Education  Provided education regarding;Advised regarding habits/behavior;Education topics     Provided education regarding  Avoidance/improvement of symptoms;Diet rationale;Key food habit change     Education Topics  Na+;Potassium;Fluid     Fluid (mL/day)  1000 mL/day     Advised Regarding Habits/Behavior  Seasoning food;Self monitor;Appropriate beverage;Label reading;Food choices        Monitor/Evaluation    Monitor  Per protocol           Electronically signed by:  Michaela Stuart  01/03/20 3:29 PM

## 2020-01-03 NOTE — PROGRESS NOTES
Nephrology ([facility]) Progress Note      Patient:  Pranay Gutierrez  YOB: 1955  Date of Service: 1/3/2020  MRN: [id number]   Acct: [id number]   Primary Care Physician: Dwaine Wiley PA-C  Advance Directive:   There are no questions and answers to display.     Admit Date: 12/12/2019       Hospital Day: 0  Referring Provider: Maxwell Gallego MD      Patient personally seen and examined.  Complete chart including Consults, Notes, Operative Reports, Labs, Cardiology, and Radiology studies reviewed as able.        Subjective:  Pranay Gutierrez is a 64 y.o. male  whom we were consulted for SARAH on HD.  First HD 10/27.  Also required CVVHD for a time.  Transferred to LTACH 12/12 for continued care.  Pt recalls no prior nephrology issues prior to SARAH requiring HD.  Notes/labs all reviewed as available.  Recalls no recent issues with dialysis.  Edema improved with RRT.  Denies cp/soa/n/v/cough.       Today, no new events.  Off Levophed drip at this time. Tolerating HD.  Nursing notes continued noncompliance with fluid restriction.  HD rescheduled for tomorrow      Temp- 97.3  Pulse- 61  Resp- 20  BP- 82/47  O2%- 95        Allergies:  Adhesive tape; Amoxicillin; Silicone; Penicillins; and Sulfamethoxazole-trimethoprim    Home Meds:  Medications Prior to Admission   Medication Sig Dispense Refill Last Dose   • ASMANEX 120 METERED DOSES 220 MCG/INH inhaler INHALE TWO PUFFS BY MOUTH TWICE A DAY 1 inhaler 11 Taking   • atorvastatin (LIPITOR) 20 MG tablet Take 20 mg by mouth Daily.   Taking   • citalopram (CeleXA) 20 MG tablet Take 20 mg by mouth Daily.   Taking   • desonide (DESOWEN) 0.05 % cream Apply 1 application topically 2 (Two) Times a Day.   Taking   • dofetilide (TIKOSYN) 250 MCG capsule Take 1 capsule by mouth Every 12 (Twelve) Hours. (Patient taking differently: Take 500 mcg by mouth Every 12 (Twelve) Hours.) 60 capsule 11 Taking   • furosemide (LASIX) 80 MG tablet Take 80  mg by mouth Daily.   Taking   • ipratropium (ATROVENT) 0.02 % nebulizer solution Take 500 mcg by nebulization 4 (Four) Times a Day.   Taking   • latanoprost (XALATAN) 0.005 % ophthalmic solution 1 drop Every Night.   Taking   • levalbuterol (XOPENEX HFA) 45 MCG/ACT inhaler Inhale 1-2 puffs Every 4 (Four) Hours As Needed for Wheezing.   Taking   • levalbuterol (XOPENEX) 1.25 MG/3ML nebulizer solution Take 3 mL by nebulization 3 (Three) Times a Day As Needed for Wheezing.   Taking   • magnesium oxide (MAGOX) 400 (241.3 Mg) MG tablet tablet Take 400 mg by mouth 2 (Two) Times a Day.   Taking   • Melatonin 10 MG tablet Take 10 mg by mouth Every Night.   Taking   • metOLazone (ZAROXOLYN) 2.5 MG tablet Take 2.5 mg by mouth Daily. 1 TABLET PO EVERY Monday, Wednesday & Friday   Taking   • metoprolol succinate XL (TOPROL-XL) 50 MG 24 hr tablet Take 75 mg by mouth 2 (Two) Times a Day.   Taking   • Metoprolol Tartrate 75 MG tablet Take 75 mg by mouth 2 (Two) Times a Day.   Taking   • Mometasone Furoate (ASMANEX HFA) 200 MCG/ACT aerosol Inhale 2 puffs 2 (Two) Times a Day.   Taking   • potassium chloride (K-DUR) 10 MEQ CR tablet Take 10 mEq by mouth 2 (Two) Times a Day.   Taking   • potassium chloride (K-DUR,KLOR-CON) 20 MEQ CR tablet Take 20 mEq by mouth 2 (Two) Times a Day. 30 mg am & 30 mg pm   Taking   • propylthiouracil (PTU) 50 MG tablet Take 50 mg by mouth Daily.   Taking   • spironolactone (ALDACTONE) 25 MG tablet Take 25 mg by mouth Daily.   Taking   • warfarin (COUMADIN) 2 MG tablet Take 2.5 mg by mouth Daily.   Taking       Medicines:  Current Facility-Administered Medications   Medication Dose Route Frequency Provider Last Rate Last Dose   • acetaminophen (TYLENOL) tablet 500 mg  500 mg Oral Q6H PRN Maxwell Gallego MD       • albumin human 25 % IV SOLN 12.5 g  12.5 g Intravenous Q5 Min PRN Renny Floyd MD       • b complex-vitamin c-folic acid (NEPHRO-JENA) tablet 1 tablet  1 tablet Oral Daily  Maxwell Gallego MD       • bumetanide (BUMEX) 10 mg in sodium chloride 0.9 % 100 mL (0.1 mg/mL) infusion  0.5 mg/hr Intravenous Continuous Adriel Viera MD       • dextrose (D50W) 25 g/ 50mL Intravenous Solution 25 g  25 g Intravenous Q15 Min PRN Maxwell Gallego MD       • dextrose (GLUTOSE) oral gel 15 g  15 g Oral Q15 Min PRN Maxwell Gallego MD       • epoetin haylee (EPOGEN,PROCRIT) injection 10,000 Units  10,000 Units Subcutaneous Weekly Sravan Adam MD       • flecainide (TAMBOCOR) tablet 25 mg  25 mg Oral Q12H Maxwell Gallego MD       • folic acid (FOLVITE) tablet 1 mg  1 mg Oral Daily Maxwell Gallego MD       • glucagon (human recombinant) (GLUCAGEN DIAGNOSTIC) injection 1 mg  1 mg Subcutaneous Q15 Min PRN Maxwell Gallego MD       • heparin (porcine) injection 2,000 Units  2,000 Units Intracatheter PRN Renny Floyd MD   2,000 Units at 12/18/19 1220   • heparin (porcine) injection 2,000 Units  2,000 Units Intracatheter PRN Renny Floyd MD   2,000 Units at 12/18/19 1221   • heparin (porcine) injection 4,100 Units  4,100 Units Intravenous Once Adriel Viera MD       • heparin (porcine) injection 4,100 Units  4,100 Units Intravenous Once Adriel Viera MD       • hypromellose (ISOPTO TEARS) 0.5 % ophthalmic solution 1 drop  1 drop Both Eyes Q4H PRN Maxwell Gallego MD       • lactulose solution 20 g  20 g Oral Daily Maxwell Gallego MD       • latanoprost (XALATAN) 0.005 % ophthalmic solution 1 drop  1 drop Left Eye Nightly Maxwell Gallego MD       • melatonin tablet 6 mg  6 mg Oral Nightly Maxwell Gallego MD       • midodrine (PROAMATINE) tablet 10 mg  10 mg Oral Q8H Sravan Adam MD       • norepinephrine (LEVOPHED) 8,000 mcg in sodium chloride 0.9 % 250 mL (32 mcg/mL) infusion  0.02-0.3 mcg/kg/min Intravenous Titrated Maxwell Gallego MD       • ondansetron (ZOFRAN) tablet 4 mg   4 mg Oral Q6H PRN Maxwell Gallego MD        Or   • ondansetron (ZOFRAN) injection 4 mg  4 mg Intravenous Q6H PRN Maxwell Gallego MD       • pantoprazole (PROTONIX) EC tablet 40 mg  40 mg Oral Q AM Maxwell Gallego MD       • riFAXIMin (XIFAXAN) tablet 550 mg  550 mg Oral Q12H Maxwell Gallego MD       • rOPINIRole (REQUIP) tablet 0.25 mg  0.25 mg Oral TID Maxwell Gallego MD       • sodium chloride nasal spray 1 spray  1 spray Each Nare Q1H PRN Maxwell Gallego MD       • vitamin A & D ointment   Topical Q12H Maxwell Gallego MD       • Meseret's amazing butt cream   Topical PRN Celina Cedeno, APRN       • Meseret's amazing butt cream   Topical TID Celina Cedeno, APRN       • Meseret's amazing butt cream   Topical BID Celina Cedeno, APRN       • warfarin (COUMADIN) tablet 1 mg  1 mg Oral Daily Maxwell Gallego MD           Past Medical History:  Past Medical History:   Diagnosis Date   • Atelectasis, left 12/9/2018   • Cerebrovascular disease, acute    • Chest pain    • Chronic atrial fibrillation (CMS/HCC)      ablation X 3; followed by Dr. Perez chronically anticoagulated with Coumadin   • Class 3 severe obesity with serious comorbidity and body mass index (BMI) of 40.0 to 44.9 in adult (CMS/HCC) 12/9/2018   • COPD (chronic obstructive pulmonary disease) (CMS/HCC)    • Diaphragm dysfunction 12/9/2018   • Glaucoma    • Hypertrophic cardiomyopathy (CMS/HCC)    • Intermittent palpitations    • Lung nodule 6/24/2019   • Mitral valve replaced    • Mixed hyperlipidemia    • Moderate persistent asthma without complication 12/9/2018   • Obstructive sleep apnea 12/9/2018   • Obstructive sleep apnea, adult    • Pacemaker    • Rash 6/24/2019    Arms and legs   • Restrictive lung disease 12/9/2018   • Stroke (CMS/HCC)     x2       Past Surgical History:  Past Surgical History:   Procedure Laterality Date   • APPENDECTOMY     • CARDIAC ABLATION  01/03/2012   •  CORONARY ARTERY BYPASS GRAFT     • HEMORROIDECTOMY     • MITRAL VALVE REPLACEMENT     • PACEMAKER IMPLANTATION      Cardiac Pacemaker Insertion   • VASECTOMY         Family History  Family History   Problem Relation Age of Onset   • Alzheimer's disease Mother    • Diabetes Father    • Stroke Father        Social History  Social History     Socioeconomic History   • Marital status:      Spouse name: Not on file   • Number of children: Not on file   • Years of education: Not on file   • Highest education level: Not on file   Tobacco Use   • Smoking status: Never Smoker   • Smokeless tobacco: Never Used   Substance and Sexual Activity   • Alcohol use: No   • Drug use: No   • Sexual activity: Defer         Review of Systems:  History obtained from chart review and the patient  General ROS: No fever or chills  Respiratory ROS: No cough, shortness of breath, wheezing  Cardiovascular ROS: No chest pain or palpitations  Gastrointestinal ROS: No abdominal pain or melena  Genito-Urinary ROS: No dysuria or hematuria    Objective:  No data found.  No intake or output data in the 24 hours ending 01/03/20 0033  General: awake/alert   Chest:  clear to auscultation bilaterally without respiratory distress  CVS: regular rate and rhythm  Abdominal: soft, nontender, positive bowel sounds  Extremities: ble edema  Skin: warm/dry with ble stasis changes      Labs:  Results from last 7 days   Lab Units 12/30/19  0459 12/27/19  0707   WBC 10*3/mm3 6.48 6.81   HEMOGLOBIN g/dL 8.7* 7.9*   HEMATOCRIT % 26.4* 24.1*   PLATELETS 10*3/mm3 75* 63*         Results from last 7 days   Lab Units 01/02/20  0502 01/01/20  0503 12/31/19  0529   SODIUM mmol/L 137 140 135*   POTASSIUM mmol/L 4.3 4.1 4.2   CHLORIDE mmol/L 99 101 96*   CO2 mmol/L 25.0 26.0 26.0   BUN mg/dL 14 10 13   CREATININE mg/dL 4.84* 3.85* 4.38*   CALCIUM mg/dL 10.2 9.9 10.0   GLUCOSE mg/dL 87 96 129*       Radiology:   Imaging Results (Last 72 Hours)     ** No results found for  the last 72 hours. **          Culture:  No results found for: BLOODCX, URINECX, WOUNDCX, MRSACX, RESPCX, STOOLCX      Assessment   SARAH  CKD 3  Hepatorenal syndrome  Anasarca  Acute/chronic diastolic CHF  Anemia/thrombocytopenia    Plan:  D/w pt/nursing  HD MTFS given volume overload and HD holiday schedule  Monitor labs  bumex drip has subjectively improved UOP, continue for today  Encouraged compliance with fluid restriction    Renny Floyd MD  1/3/2020  12:33 AM

## 2020-01-03 NOTE — PROGRESS NOTES
Nephrology ([facility]) Progress Note      Patient:  Pranay Gutierrez  YOB: 1955  Date of Service: 1/3/2020  MRN: [id number]   Acct: [id number]   Primary Care Physician: Dwaine Wiley PA-C  Advance Directive:   There are no questions and answers to display.     Admit Date: 12/12/2019       Hospital Day: 0  Referring Provider: Maxwell Gallego MD      Patient personally seen and examined.  Complete chart including Consults, Notes, Operative Reports, Labs, Cardiology, and Radiology studies reviewed as able.        Subjective:  Pranay Gutierrez is a 64 y.o. male  whom we were consulted for SARAH on HD.  First HD 10/27.  Also required CVVHD for a time.  Transferred to LTACH 12/12 for continued care.  Pt recalls no prior nephrology issues prior to SARAH requiring HD.  Notes/labs all reviewed as available.  Recalls no recent issues with dialysis.  Edema improved with RRT.  Denies cp/soa/n/v/cough.       Today, no new events.  On Levophed drip at this time. Tolerating HD.  Completed HD today without issue aside from levophed usage.  Edema is improving.    Temp- 97.9  Pulse- 61  Resp- 19  BP- 86/60  O2%- 97        Allergies:  Adhesive tape; Amoxicillin; Silicone; Penicillins; and Sulfamethoxazole-trimethoprim    Home Meds:  Medications Prior to Admission   Medication Sig Dispense Refill Last Dose   • ASMANEX 120 METERED DOSES 220 MCG/INH inhaler INHALE TWO PUFFS BY MOUTH TWICE A DAY 1 inhaler 11 Taking   • atorvastatin (LIPITOR) 20 MG tablet Take 20 mg by mouth Daily.   Taking   • citalopram (CeleXA) 20 MG tablet Take 20 mg by mouth Daily.   Taking   • desonide (DESOWEN) 0.05 % cream Apply 1 application topically 2 (Two) Times a Day.   Taking   • dofetilide (TIKOSYN) 250 MCG capsule Take 1 capsule by mouth Every 12 (Twelve) Hours. (Patient taking differently: Take 500 mcg by mouth Every 12 (Twelve) Hours.) 60 capsule 11 Taking   • furosemide (LASIX) 80 MG tablet Take 80 mg by mouth  Daily.   Taking   • ipratropium (ATROVENT) 0.02 % nebulizer solution Take 500 mcg by nebulization 4 (Four) Times a Day.   Taking   • latanoprost (XALATAN) 0.005 % ophthalmic solution 1 drop Every Night.   Taking   • levalbuterol (XOPENEX HFA) 45 MCG/ACT inhaler Inhale 1-2 puffs Every 4 (Four) Hours As Needed for Wheezing.   Taking   • levalbuterol (XOPENEX) 1.25 MG/3ML nebulizer solution Take 3 mL by nebulization 3 (Three) Times a Day As Needed for Wheezing.   Taking   • magnesium oxide (MAGOX) 400 (241.3 Mg) MG tablet tablet Take 400 mg by mouth 2 (Two) Times a Day.   Taking   • Melatonin 10 MG tablet Take 10 mg by mouth Every Night.   Taking   • metOLazone (ZAROXOLYN) 2.5 MG tablet Take 2.5 mg by mouth Daily. 1 TABLET PO EVERY Monday, Wednesday & Friday   Taking   • metoprolol succinate XL (TOPROL-XL) 50 MG 24 hr tablet Take 75 mg by mouth 2 (Two) Times a Day.   Taking   • Metoprolol Tartrate 75 MG tablet Take 75 mg by mouth 2 (Two) Times a Day.   Taking   • Mometasone Furoate (ASMANEX HFA) 200 MCG/ACT aerosol Inhale 2 puffs 2 (Two) Times a Day.   Taking   • potassium chloride (K-DUR) 10 MEQ CR tablet Take 10 mEq by mouth 2 (Two) Times a Day.   Taking   • potassium chloride (K-DUR,KLOR-CON) 20 MEQ CR tablet Take 20 mEq by mouth 2 (Two) Times a Day. 30 mg am & 30 mg pm   Taking   • propylthiouracil (PTU) 50 MG tablet Take 50 mg by mouth Daily.   Taking   • spironolactone (ALDACTONE) 25 MG tablet Take 25 mg by mouth Daily.   Taking   • warfarin (COUMADIN) 2 MG tablet Take 2.5 mg by mouth Daily.   Taking       Medicines:  Current Facility-Administered Medications   Medication Dose Route Frequency Provider Last Rate Last Dose   • acetaminophen (TYLENOL) tablet 500 mg  500 mg Oral Q6H PRN Maxwell Gallego MD       • albumin human 25 % IV SOLN 12.5 g  12.5 g Intravenous Q5 Min PRN Renny Floyd MD       • b complex-vitamin c-folic acid (NEPHRO-JENA) tablet 1 tablet  1 tablet Oral Daily Maxwell Gallego  MD Vic       • bumetanide (BUMEX) 10 mg in sodium chloride 0.9 % 100 mL (0.1 mg/mL) infusion  0.5 mg/hr Intravenous Continuous Adriel Viera MD       • dextrose (D50W) 25 g/ 50mL Intravenous Solution 25 g  25 g Intravenous Q15 Min PRN Maxwell Gallego MD       • dextrose (GLUTOSE) oral gel 15 g  15 g Oral Q15 Min PRN Maxwell Gallego MD       • epoetin haylee (EPOGEN,PROCRIT) injection 10,000 Units  10,000 Units Subcutaneous Weekly Sravan Adam MD       • flecainide (TAMBOCOR) tablet 25 mg  25 mg Oral Q12H Maxwell Gallego MD       • folic acid (FOLVITE) tablet 1 mg  1 mg Oral Daily Maxwell Gallego MD       • glucagon (human recombinant) (GLUCAGEN DIAGNOSTIC) injection 1 mg  1 mg Subcutaneous Q15 Min PRN Maxwell Gallego MD       • heparin (porcine) injection 2,000 Units  2,000 Units Intracatheter PRN Renny Floyd MD   2,000 Units at 12/18/19 1220   • heparin (porcine) injection 2,000 Units  2,000 Units Intracatheter PRN Renny Floyd MD   2,000 Units at 12/18/19 1221   • heparin (porcine) injection 4,100 Units  4,100 Units Intravenous Once Adriel Viera MD       • heparin (porcine) injection 4,100 Units  4,100 Units Intravenous Once Adriel Viera MD       • hypromellose (ISOPTO TEARS) 0.5 % ophthalmic solution 1 drop  1 drop Both Eyes Q4H PRN Maxwell Gallego MD       • lactulose solution 20 g  20 g Oral Daily Maxwell Gallego MD       • latanoprost (XALATAN) 0.005 % ophthalmic solution 1 drop  1 drop Left Eye Nightly Maxwell Gallego MD       • melatonin tablet 6 mg  6 mg Oral Nightly Maxwell Gallego MD       • midodrine (PROAMATINE) tablet 10 mg  10 mg Oral Q8H Sravan Adam MD       • norepinephrine (LEVOPHED) 8,000 mcg in sodium chloride 0.9 % 250 mL (32 mcg/mL) infusion  0.02-0.3 mcg/kg/min Intravenous Titrated Maxwell Gallego MD       • ondansetron (ZOFRAN) tablet 4 mg  4 mg Oral Q6H  PRN Maxwell Gallego MD        Or   • ondansetron (ZOFRAN) injection 4 mg  4 mg Intravenous Q6H PRN Maxwell Gallego MD       • pantoprazole (PROTONIX) EC tablet 40 mg  40 mg Oral Q AM Maxwell Gallego MD       • riFAXIMin (XIFAXAN) tablet 550 mg  550 mg Oral Q12H Maxwell Gallego MD       • rOPINIRole (REQUIP) tablet 0.25 mg  0.25 mg Oral TID Maxwell Gallego MD       • sodium chloride nasal spray 1 spray  1 spray Each Nare Q1H PRN Maxwell Gallego MD       • vitamin A & D ointment   Topical Q12H Maxwell Gallego MD       • Meseret's amazing butt cream   Topical PRN Celina Cedeno, APRN       • Meseret's amazing butt cream   Topical TID Celina Cedeno, APRN       • Meseret's amazing butt cream   Topical BID Celina Cedeno, APRN       • warfarin (COUMADIN) tablet 1 mg  1 mg Oral Daily Maxwell Gallego MD           Past Medical History:  Past Medical History:   Diagnosis Date   • Atelectasis, left 12/9/2018   • Cerebrovascular disease, acute    • Chest pain    • Chronic atrial fibrillation (CMS/HCC)      ablation X 3; followed by Dr. Perez chronically anticoagulated with Coumadin   • Class 3 severe obesity with serious comorbidity and body mass index (BMI) of 40.0 to 44.9 in adult (CMS/HCC) 12/9/2018   • COPD (chronic obstructive pulmonary disease) (CMS/HCC)    • Diaphragm dysfunction 12/9/2018   • Glaucoma    • Hypertrophic cardiomyopathy (CMS/HCC)    • Intermittent palpitations    • Lung nodule 6/24/2019   • Mitral valve replaced    • Mixed hyperlipidemia    • Moderate persistent asthma without complication 12/9/2018   • Obstructive sleep apnea 12/9/2018   • Obstructive sleep apnea, adult    • Pacemaker    • Rash 6/24/2019    Arms and legs   • Restrictive lung disease 12/9/2018   • Stroke (CMS/HCC)     x2       Past Surgical History:  Past Surgical History:   Procedure Laterality Date   • APPENDECTOMY     • CARDIAC ABLATION  01/03/2012   • CORONARY ARTERY  BYPASS GRAFT     • HEMORROIDECTOMY     • MITRAL VALVE REPLACEMENT     • PACEMAKER IMPLANTATION      Cardiac Pacemaker Insertion   • VASECTOMY         Family History  Family History   Problem Relation Age of Onset   • Alzheimer's disease Mother    • Diabetes Father    • Stroke Father        Social History  Social History     Socioeconomic History   • Marital status:      Spouse name: Not on file   • Number of children: Not on file   • Years of education: Not on file   • Highest education level: Not on file   Tobacco Use   • Smoking status: Never Smoker   • Smokeless tobacco: Never Used   Substance and Sexual Activity   • Alcohol use: No   • Drug use: No   • Sexual activity: Defer         Review of Systems:  History obtained from chart review and the patient  General ROS: No fever or chills  Respiratory ROS: No cough, shortness of breath, wheezing  Cardiovascular ROS: No chest pain or palpitations  Gastrointestinal ROS: No abdominal pain or melena  Genito-Urinary ROS: No dysuria or hematuria    Objective:  No data found.  No intake or output data in the 24 hours ending 01/03/20 1205  General: awake/alert   Chest:  clear to auscultation bilaterally without respiratory distress  CVS: regular rate and rhythm  Abdominal: soft, nontender, positive bowel sounds  Extremities: ble edema  Skin: warm/dry with ble stasis changes      Labs:  Results from last 7 days   Lab Units 12/30/19  0459   WBC 10*3/mm3 6.48   HEMOGLOBIN g/dL 8.7*   HEMATOCRIT % 26.4*   PLATELETS 10*3/mm3 75*         Results from last 7 days   Lab Units 01/03/20  0500 01/02/20  0502 01/01/20  0503   SODIUM mmol/L 135* 137 140   POTASSIUM mmol/L 4.3 4.3 4.1   CHLORIDE mmol/L 96* 99 101   CO2 mmol/L 25.0 25.0 26.0   BUN mg/dL 17 14 10   CREATININE mg/dL 5.59* 4.84* 3.85*   CALCIUM mg/dL 10.3 10.2 9.9   GLUCOSE mg/dL 85 87 96       Radiology:   Imaging Results (Last 72 Hours)     ** No results found for the last 72 hours. **          Culture:  No results  found for: BLOODCX, URINECX, WOUNDCX, MRSACX, RESPCX, STOOLCX      Assessment   SARAH  CKD 3  Hepatorenal syndrome  Anasarca  Acute/chronic diastolic CHF  Anemia/thrombocytopenia    Plan:  D/w pt/nursing  HD MTFS given volume overload and HD holiday schedule  Monitor labs  bumex drip has subjectively improved UOP, continue for today  Encouraged compliance with fluid restriction with pt and family    Renny Floyd MD  1/3/2020  5:45 PM

## 2020-01-04 LAB
ANION GAP SERPL CALCULATED.3IONS-SCNC: 14 MMOL/L (ref 5–15)
APTT PPP: 60.4 SECONDS (ref 24.1–35)
BUN BLD-MCNC: 12 MG/DL (ref 8–23)
BUN/CREAT SERPL: 2.8 (ref 7–25)
CALCIUM SPEC-SCNC: 9.6 MG/DL (ref 8.6–10.5)
CHLORIDE SERPL-SCNC: 99 MMOL/L (ref 98–107)
CO2 SERPL-SCNC: 25 MMOL/L (ref 22–29)
CREAT BLD-MCNC: 4.23 MG/DL (ref 0.76–1.27)
GFR SERPL CREATININE-BSD FRML MDRD: 14 ML/MIN/1.73
GFR SERPL CREATININE-BSD FRML MDRD: ABNORMAL ML/MIN/{1.73_M2}
GLUCOSE BLD-MCNC: 132 MG/DL (ref 65–99)
INR PPP: 4.13 (ref 0.91–1.09)
MAGNESIUM SERPL-MCNC: 2.1 MG/DL (ref 1.6–2.4)
POTASSIUM BLD-SCNC: 3.8 MMOL/L (ref 3.5–5.2)
PROTHROMBIN TIME: 41.6 SECONDS (ref 11.9–14.6)
SODIUM BLD-SCNC: 138 MMOL/L (ref 136–145)
WHOLE BLOOD HOLD SPECIMEN: NORMAL

## 2020-01-04 PROCEDURE — 25010000002 ALBUMIN HUMAN 25% PER 50 ML: Performed by: INTERNAL MEDICINE

## 2020-01-04 PROCEDURE — 85610 PROTHROMBIN TIME: CPT | Performed by: INTERNAL MEDICINE

## 2020-01-04 PROCEDURE — 97110 THERAPEUTIC EXERCISES: CPT

## 2020-01-04 PROCEDURE — P9047 ALBUMIN (HUMAN), 25%, 50ML: HCPCS | Performed by: INTERNAL MEDICINE

## 2020-01-04 PROCEDURE — 97530 THERAPEUTIC ACTIVITIES: CPT

## 2020-01-04 PROCEDURE — 25010000002 HEPARIN (PORCINE) PER 1000 UNITS: Performed by: INTERNAL MEDICINE

## 2020-01-04 PROCEDURE — 83735 ASSAY OF MAGNESIUM: CPT | Performed by: INTERNAL MEDICINE

## 2020-01-04 PROCEDURE — 97116 GAIT TRAINING THERAPY: CPT

## 2020-01-04 PROCEDURE — 80048 BASIC METABOLIC PNL TOTAL CA: CPT | Performed by: INTERNAL MEDICINE

## 2020-01-04 PROCEDURE — 85730 THROMBOPLASTIN TIME PARTIAL: CPT | Performed by: INTERNAL MEDICINE

## 2020-01-04 NOTE — PROGRESS NOTES
[facility]  Gbariel Gallego M.D.  SHARRON Gallego M.D.  GLORIA Shafer APRN      Internal Medicine Progress Note    1/4/2020   4:11 PM    Name:  Pranay Gutierrez  MRN:    [id number]     Acct:     [id number]   Room:  [address] [place] [address] Day: 0     Admit Date: 12/12/2019  3:06 PM  PCP: Dwaine Wiley PA-C    Subjective:     C/C: Continued HD and rehabilitation    Interval History: Status: Improved. Sitting in chair, eating lunch.  No family at bedside.  Remains noncompliant with fluid and dietary restrictions. INR supratherapeutic. Slow progress with therapy. Seen on HD.       Review of Systems   Constitution: Positive for malaise/fatigue. Negative for chills, decreased appetite and fever.   HENT: Negative for congestion, hoarse voice, nosebleeds and sore throat.    Eyes: Negative for blurred vision, discharge, pain and visual disturbance.   Cardiovascular: Negative for chest pain, dyspnea on exertion, irregular heartbeat, leg swelling, orthopnea and palpitations.   Respiratory: Negative for cough, shortness of breath, sputum production and wheezing.    Hematologic/Lymphatic: Negative for bleeding problem. Does not bruise/bleed easily.   Skin: Negative for dry skin, flushing, itching, poor wound healing, rash and suspicious lesions.        Significant ecchymosis   Musculoskeletal: Negative for arthritis, back pain, falls, joint pain, joint swelling, muscle weakness and myalgias.   Gastrointestinal: Negative for bloating, abdominal pain, change in bowel habit, diarrhea, flatus, heartburn, melena and nausea.   Genitourinary: Negative for frequency, hesitancy, incomplete emptying, pelvic pain and urgency.   Neurological: Negative for difficulty with concentration, disturbances in coordination, dizziness, headaches, numbness, paresthesias, tremors and weakness.   Psychiatric/Behavioral: Negative for altered mental status, hallucinations and memory loss. The patient is not nervous/anxious.           Medications:     Allergies:   Allergies   Allergen Reactions   • Adhesive Tape Other (See Comments)   • Amoxicillin Other (See Comments)     unknkown   • Silicone Other (See Comments)     unknown   • Penicillins Hives   • Sulfamethoxazole-Trimethoprim Nausea Only       Current Meds:   Current Facility-Administered Medications:   •  acetaminophen (TYLENOL) tablet 500 mg, 500 mg, Oral, Q6H PRN, Maxwell Gallego MD  •  albumin human 25 % IV SOLN 12.5 g, 12.5 g, Intravenous, Q5 Min PRN, Renny Floyd MD  •  b complex-vitamin c-folic acid (NEPHRO-JENA) tablet 1 tablet, 1 tablet, Oral, Daily, Maxwell Gallego MD  •  bumetanide (BUMEX) 10 mg in sodium chloride 0.9 % 100 mL (0.1 mg/mL) infusion, 0.5 mg/hr, Intravenous, Continuous, Adriel Viera MD  •  dextrose (D50W) 25 g/ 50mL Intravenous Solution 25 g, 25 g, Intravenous, Q15 Min PRN, Maxwell Gallego MD  •  dextrose (GLUTOSE) oral gel 15 g, 15 g, Oral, Q15 Min PRN, Maxwell Gallego MD  •  epoetin haylee (EPOGEN,PROCRIT) injection 10,000 Units, 10,000 Units, Subcutaneous, Weekly, Sravan Adam MD  •  flecainide (TAMBOCOR) tablet 25 mg, 25 mg, Oral, Q12H, Maxwell Gallego MD  •  folic acid (FOLVITE) tablet 1 mg, 1 mg, Oral, Daily, Maxwell Gallego MD  •  glucagon (human recombinant) (GLUCAGEN DIAGNOSTIC) injection 1 mg, 1 mg, Subcutaneous, Q15 Min PRN, Maxwell Gallego MD  •  heparin (porcine) injection 2,000 Units, 2,000 Units, Intracatheter, PRN, Renny Floyd MD, 2,000 Units at 12/18/19 1220  •  heparin (porcine) injection 2,000 Units, 2,000 Units, Intracatheter, PRN, Renny Floyd MD, 2,000 Units at 12/18/19 1221  •  heparin (porcine) injection 4,100 Units, 4,100 Units, Intravenous, Once, Adriel Viera MD  •  heparin (porcine) injection 4,100 Units, 4,100 Units, Intravenous, Once, Adriel Viera MD  •  hypromellose (ISOPTO TEARS) 0.5 % ophthalmic  solution 1 drop, 1 drop, Both Eyes, Q4H PRN, Maxwell Gallego MD  •  lactulose solution 20 g, 20 g, Oral, Daily, Maxwell Gallego MD  •  latanoprost (XALATAN) 0.005 % ophthalmic solution 1 drop, 1 drop, Left Eye, Nightly, Maxwell Gallego MD  •  melatonin tablet 6 mg, 6 mg, Oral, Nightly, Maxwell Gallego MD  •  midodrine (PROAMATINE) tablet 10 mg, 10 mg, Oral, Q8H, Sravan Adam MD  •  norepinephrine (LEVOPHED) 8,000 mcg in sodium chloride 0.9 % 250 mL (32 mcg/mL) infusion, 0.02-0.3 mcg/kg/min, Intravenous, Titrated, Maxwell Gallego MD  •  ondansetron (ZOFRAN) tablet 4 mg, 4 mg, Oral, Q6H PRN **OR** ondansetron (ZOFRAN) injection 4 mg, 4 mg, Intravenous, Q6H PRN, Maxwell Gallego MD  •  pantoprazole (PROTONIX) EC tablet 40 mg, 40 mg, Oral, Q AM, Maxwell Gallego MD  •  riFAXIMin (XIFAXAN) tablet 550 mg, 550 mg, Oral, Q12H, Maxwell Gallego MD  •  rOPINIRole (REQUIP) tablet 0.25 mg, 0.25 mg, Oral, TID, Maxwell Gallego MD  •  sodium chloride nasal spray 1 spray, 1 spray, Each Nare, Q1H PRN, Maxwell Gallego MD  •  vitamin A & D ointment, , Topical, Q12H, Maxewll Gallego MD  •  Meseret's amazing butt cream, , Topical, PRN, Celina Cedeno, APRN  •  Meseret's amazing butt cream, , Topical, TID, Celina Cedeno, APRN  •  Meseret's amazing butt cream, , Topical, BID, Celina Cedeno, APRN  •  warfarin (COUMADIN) tablet 1 mg, 1 mg, Oral, Daily, Maxwell Gallego MD    Data:     Code Status:    There are no questions and answers to display.       Family History   Problem Relation Age of Onset   • Alzheimer's disease Mother    • Diabetes Father    • Stroke Father        Social History     Socioeconomic History   • Marital status:      Spouse name: Not on file   • Number of children: Not on file   • Years of education: Not on file   • Highest education level: Not on file   Tobacco Use   • Smoking status: Never Smoker   • Smokeless tobacco:  "Never Used   Substance and Sexual Activity   • Alcohol use: No   • Drug use: No   • Sexual activity: Defer       Vitals:  Ht 177.8 cm (70\")   Wt 133 kg (293 lb 6.4 oz)   BMI 42.10 kg/m²   T 97.2 P 64 R 12 BP 75/42 Sp02 98% (room air)  I/O (24Hr):  No intake or output data in the 24 hours ending 01/04/20 1611    Labs and imaging:      Lab Results (last 24 hours)     Procedure Component Value Units Date/Time    Extra Tubes [[id number]] Collected:  01/04/20 0434    Specimen:  Blood, Venous Line Updated:  01/04/20 0715    Narrative:       The following orders were created for panel order Extra Tubes.  Procedure                               Abnormality         Status                     ---------                               -----------         ------                     Lavender Top[[id number]]                                     Final result                 Please view results for these tests on the individual orders.    Lavender Top [[id number]] Collected:  01/04/20 0434    Specimen:  Blood Updated:  01/04/20 0715     Extra Tube hold for add-on     Comment: Auto resulted       Basic Metabolic Panel [[id number]]  (Abnormal) Collected:  01/04/20 0433    Specimen:  Blood Updated:  01/04/20 0610     Glucose 132 mg/dL      BUN 12 mg/dL      Creatinine 4.23 mg/dL      Sodium 138 mmol/L      Potassium 3.8 mmol/L      Chloride 99 mmol/L      CO2 25.0 mmol/L      Calcium 9.6 mg/dL      eGFR   Amer --     Comment: <15 Indicative of kidney failure.        eGFR Non African Amer 14 mL/min/1.73      Comment: <15 Indicative of kidney failure.        BUN/Creatinine Ratio 2.8     Anion Gap 14.0 mmol/L     Narrative:       GFR Normal >60  Chronic Kidney Disease <60  Kidney Failure <15      Magnesium [[id number]]  (Normal) Collected:  01/04/20 0433    Specimen:  Blood Updated:  01/04/20 0529     Magnesium 2.1 mg/dL     aPTT [[id number]]  (Abnormal) Collected:  01/04/20 0433    Specimen:  Blood Updated:  01/04/20 0520     PTT 60.4 " seconds     Protime-INR [[id number]]  (Abnormal) Collected:  01/04/20 0433    Specimen:  Blood Updated:  01/04/20 0520     Protime 41.6 Seconds      INR 4.13            Xr Chest 1 View    Result Date: 12/19/2019  Narrative: HISTORY: Verify PICC placement  CXR: A frontal view the chest obtained.  COMPARISON: 12/12/2019  FINDINGS: Left upper extremity PICC line is in place with the tip projecting over the SVC. There is a tunneled right internal jugular permacatheter. There is left subclavian cardiac pacer device. The prosthetic coronary valve with median sternotomy wires.  Cardiac silhouette is enlarged. Probable patchy left basilar atelectasis with a questionable trace left pleural effusion. No pneumothorax.      Impression: 1. Left approximately PICC line tip projecting in the SVC. Additional lines described above. 2. Cardiomegaly with probable patchy left basilar atelectasis. This report was finalized on 12/19/2019 14:54 by Dr. Ivanna Eduardo MD.    Xr Chest 1 View    Result Date: 12/12/2019  Narrative: Exam:   XR CHEST 1 VW-   Date:  12/12/2019  History:  Male, age  64 years;confirmation of all lines (ETT, tracheostomy tube, central venous catheters) on arrival  COMPARISON:  Chest x-ray dated 12/05/2018.  Findings : There is a new central venous catheter, IJ approach, terminating over the mid to low SVC region. The left-sided cardiac device with leads project over the right atrium and right ventricle. Prior aortic valve replacement. Median sternotomy wires appear intact. The heart and mediastinum are unchanged in size. Obscuration of the left lung base, concerning for consolidative process.  The bones show no acute pathology.       Impression: Impression:  1.  Lines as described above. 2.  Obscuration of the left lung base, concerning for a left lower lobe consolidative process.  This report was finalized on 12/12/2019 20:39 by Dr. Sana Terrazas MD.      Physical Examination:        Physical Exam    Constitutional: He is oriented to person, place, and time. Vital signs are normal. He appears well-developed and well-nourished. He is cooperative.   HENT:   Head: Normocephalic and atraumatic.   Nose: Nose normal.   Mouth/Throat: Oropharynx is clear and moist.   Eyes: Pupils are equal, round, and reactive to light. Conjunctivae, EOM and lids are normal.   Neck: Trachea normal and normal range of motion. Neck supple.   Cardiovascular: Normal rate, regular rhythm and normal heart sounds.   Pulmonary/Chest: Effort normal and breath sounds normal.   Abdominal: Soft. Normal appearance and bowel sounds are normal.   Obese   Musculoskeletal: Normal range of motion. He exhibits edema (diffuse).   Generalized weakness   Neurological: He is alert and oriented to person, place, and time. He has normal strength. No cranial nerve deficit or sensory deficit.   Skin: Skin is warm, dry and intact. No petechiae and no rash noted. No cyanosis or erythema. Nails show no clubbing.   Scattered ecchymosis bilateral upper extremities  Jaundice  BLE discolored   Psychiatric: He has a normal mood and affect. His speech is normal and behavior is normal. Judgment and thought content normal. Cognition and memory are normal.   Nursing note and vitals reviewed.        Assessment:             * No active hospital problems. *    Past Medical History:   Diagnosis Date   • Atelectasis, left 12/9/2018   • Cerebrovascular disease, acute    • Chest pain    • Chronic atrial fibrillation (CMS/HCC)      ablation X 3; followed by Dr. Perez chronically anticoagulated with Coumadin   • Class 3 severe obesity with serious comorbidity and body mass index (BMI) of 40.0 to 44.9 in adult (CMS/HCC) 12/9/2018   • COPD (chronic obstructive pulmonary disease) (CMS/HCC)    • Diaphragm dysfunction 12/9/2018   • Glaucoma    • Hypertrophic cardiomyopathy (CMS/HCC)    • Intermittent palpitations    • Lung nodule 6/24/2019   • Mitral valve replaced    • Mixed  hyperlipidemia    • Moderate persistent asthma without complication 12/9/2018   • Obstructive sleep apnea 12/9/2018   • Obstructive sleep apnea, adult    • Pacemaker    • Rash 6/24/2019    Arms and legs   • Restrictive lung disease 12/9/2018   • Stroke (CMS/HCC)     x2        Plan:        1. Acute on chronic diastolic CHF  2. Acute kidney injury on CKD4  3. Multifactorial anemia  4. Chronic anticoagulation  5. MINH  6. COPD  7. Hx CVA  8. Hypertrophic cardiomyopathy  9. Hx MVR  10. Atrial fibrillation  11. PATTERSON with cirrhosis  12. Hyperthyroidism  13. Thrombocytopenia, most likely secondary to liver disease and consumption due to acute illness  14. Supratherapeutic INR     Continue current treatment. Monitor counts. Increase activity as tolerated. Aggressive therapies as tolerated. Labs with HD Monday. Continue blood pressure support as needed with levophed. Appreciate cardiology input. Hold coumadin for inr > 3.5.  HD per nephrology. Monitor platelets closely. Encourage compliance with fluid restriction.       Electronically signed by GLORIA Pinedo on 1/4/2020 at 4:11 PM   .    I have discussed the care of Pranay Gutierrez, including pertinent history and exam findings, with the nurse practitioner.    I have seen and examined the patient and the key elements of all parts of the encounter have been performed by me.  I agree with the assessment, plan and orders as documented by GLORIA Fernando, after I modified the exam findings and the plan of treatments and the final version is my approved version of the assessment.        Electronically signed by Maxwell Gallego MD on 1/4/2020 at 11:14 PM

## 2020-01-05 LAB
ANION GAP SERPL CALCULATED.3IONS-SCNC: 15 MMOL/L (ref 5–15)
APTT PPP: 58.2 SECONDS (ref 24.1–35)
BUN BLD-MCNC: 12 MG/DL (ref 8–23)
BUN/CREAT SERPL: 3.2 (ref 7–25)
CALCIUM SPEC-SCNC: 10.2 MG/DL (ref 8.6–10.5)
CHLORIDE SERPL-SCNC: 97 MMOL/L (ref 98–107)
CO2 SERPL-SCNC: 25 MMOL/L (ref 22–29)
CREAT BLD-MCNC: 3.77 MG/DL (ref 0.76–1.27)
GFR SERPL CREATININE-BSD FRML MDRD: 16 ML/MIN/1.73
GLUCOSE BLD-MCNC: 117 MG/DL (ref 65–99)
INR PPP: 3.99 (ref 0.91–1.09)
MAGNESIUM SERPL-MCNC: 2.1 MG/DL (ref 1.6–2.4)
POTASSIUM BLD-SCNC: 4 MMOL/L (ref 3.5–5.2)
PROTHROMBIN TIME: 40.5 SECONDS (ref 11.9–14.6)
SODIUM BLD-SCNC: 137 MMOL/L (ref 136–145)
WHOLE BLOOD HOLD SPECIMEN: NORMAL

## 2020-01-05 PROCEDURE — 97530 THERAPEUTIC ACTIVITIES: CPT

## 2020-01-05 PROCEDURE — 85730 THROMBOPLASTIN TIME PARTIAL: CPT | Performed by: INTERNAL MEDICINE

## 2020-01-05 PROCEDURE — 80048 BASIC METABOLIC PNL TOTAL CA: CPT | Performed by: INTERNAL MEDICINE

## 2020-01-05 PROCEDURE — 85610 PROTHROMBIN TIME: CPT | Performed by: INTERNAL MEDICINE

## 2020-01-05 PROCEDURE — 83735 ASSAY OF MAGNESIUM: CPT | Performed by: INTERNAL MEDICINE

## 2020-01-05 NOTE — PROGRESS NOTES
[facility]  RA Peterson M.D.  GLORIA Shafer APRN      Internal Medicine Progress Note    1/5/2020   2:52 PM    Name:  Pranay Gutierrez  MRN:    [id number]     Acct:     [id number]   Room:  [address] [place] [address] Day: 0     Admit Date: 12/12/2019  3:06 PM  PCP: Dwaine Wiley PA-C    Subjective:     C/C: Continued HD and rehabilitation    Interval History: Status: Improved. Sitting in bed, wife and sister at bedside. Remains noncompliant with fluid and dietary restrictions. INR supratherapeutic. Slow progress with therapy.        Review of Systems   Constitution: Positive for malaise/fatigue. Negative for chills, decreased appetite and fever.   HENT: Negative for congestion, hoarse voice, nosebleeds and sore throat.    Eyes: Negative for blurred vision, discharge, pain and visual disturbance.   Cardiovascular: Negative for chest pain, dyspnea on exertion, irregular heartbeat, leg swelling, orthopnea and palpitations.   Respiratory: Negative for cough, shortness of breath, sputum production and wheezing.    Hematologic/Lymphatic: Negative for bleeding problem. Does not bruise/bleed easily.   Skin: Negative for dry skin, flushing, itching, poor wound healing, rash and suspicious lesions.        Significant ecchymosis   Musculoskeletal: Negative for arthritis, back pain, falls, joint pain, joint swelling, muscle weakness and myalgias.   Gastrointestinal: Negative for bloating, abdominal pain, change in bowel habit, diarrhea, flatus, heartburn, melena and nausea.   Genitourinary: Negative for frequency, hesitancy, incomplete emptying, pelvic pain and urgency.   Neurological: Negative for difficulty with concentration, disturbances in coordination, dizziness, headaches, numbness, paresthesias, tremors and weakness.   Psychiatric/Behavioral: Negative for altered mental status, hallucinations and memory loss. The patient is not nervous/anxious.          Medications:      Allergies:   Allergies   Allergen Reactions   • Adhesive Tape Other (See Comments)   • Amoxicillin Other (See Comments)     unknkown   • Silicone Other (See Comments)     unknown   • Penicillins Hives   • Sulfamethoxazole-Trimethoprim Nausea Only       Current Meds:   Current Facility-Administered Medications:   •  acetaminophen (TYLENOL) tablet 500 mg, 500 mg, Oral, Q6H PRN, Maxwell Gallego MD  •  albumin human 25 % IV SOLN 12.5 g, 12.5 g, Intravenous, Q5 Min PRN, Renny Floyd MD  •  b complex-vitamin c-folic acid (NEPHRO-JENA) tablet 1 tablet, 1 tablet, Oral, Daily, Maxwell Gallego MD  •  bumetanide (BUMEX) 10 mg in sodium chloride 0.9 % 100 mL (0.1 mg/mL) infusion, 0.5 mg/hr, Intravenous, Continuous, Adriel Viera MD  •  dextrose (D50W) 25 g/ 50mL Intravenous Solution 25 g, 25 g, Intravenous, Q15 Min PRN, Maxwell Gallego MD  •  dextrose (GLUTOSE) oral gel 15 g, 15 g, Oral, Q15 Min PRN, Maxwell Gallego MD  •  epoetin haylee (EPOGEN,PROCRIT) injection 10,000 Units, 10,000 Units, Subcutaneous, Weekly, Sravan Adam MD  •  flecainide (TAMBOCOR) tablet 25 mg, 25 mg, Oral, Q12H, Maxwell Gallego MD  •  folic acid (FOLVITE) tablet 1 mg, 1 mg, Oral, Daily, Maxwell Gallego MD  •  glucagon (human recombinant) (GLUCAGEN DIAGNOSTIC) injection 1 mg, 1 mg, Subcutaneous, Q15 Min PRN, Maxwell Gallego MD  •  heparin (porcine) injection 2,000 Units, 2,000 Units, Intracatheter, PRN, Renny Floyd MD, 2,000 Units at 12/18/19 1220  •  heparin (porcine) injection 2,000 Units, 2,000 Units, Intracatheter, PRN, Renny Floyd MD, 2,000 Units at 12/18/19 1221  •  heparin (porcine) injection 4,100 Units, 4,100 Units, Intravenous, Once, Adriel Viera MD  •  heparin (porcine) injection 4,100 Units, 4,100 Units, Intravenous, Once, Adriel Viera MD  •  hypromellose (ISOPTO TEARS) 0.5 % ophthalmic solution 1 drop, 1 drop,  Both Eyes, Q4H PRN, Maxwell Gallego MD  •  lactulose solution 20 g, 20 g, Oral, Daily, Maxwell Gallego MD  •  latanoprost (XALATAN) 0.005 % ophthalmic solution 1 drop, 1 drop, Left Eye, Nightly, Maxwell Gallego MD  •  melatonin tablet 6 mg, 6 mg, Oral, Nightly, Maxwell Gallego MD  •  midodrine (PROAMATINE) tablet 10 mg, 10 mg, Oral, Q8H, Sravan Adam MD  •  norepinephrine (LEVOPHED) 8,000 mcg in sodium chloride 0.9 % 250 mL (32 mcg/mL) infusion, 0.02-0.3 mcg/kg/min, Intravenous, Titrated, Maxwell Gallego MD  •  ondansetron (ZOFRAN) tablet 4 mg, 4 mg, Oral, Q6H PRN **OR** ondansetron (ZOFRAN) injection 4 mg, 4 mg, Intravenous, Q6H PRN, Maxwell Gallego MD  •  pantoprazole (PROTONIX) EC tablet 40 mg, 40 mg, Oral, Q AM, Maxwell Gallego MD  •  riFAXIMin (XIFAXAN) tablet 550 mg, 550 mg, Oral, Q12H, Maxwell Gallego MD  •  rOPINIRole (REQUIP) tablet 0.25 mg, 0.25 mg, Oral, TID, Maxwell Gallego MD  •  sodium chloride nasal spray 1 spray, 1 spray, Each Nare, Q1H PRN, Maxwell Gallego MD  •  vitamin A & D ointment, , Topical, Q12H, Maxwell Gallego MD  •  Meseret's amazing butt cream, , Topical, PRN, Celina Cedeno, APRN  •  Meseret's amazing butt cream, , Topical, TID, Celina Cedeno, APRN  •  Meseret's amazing butt cream, , Topical, BID, Celina Cedeno, APRN  •  warfarin (COUMADIN) tablet 1 mg, 1 mg, Oral, Daily, Maxwell Gallego MD    Data:     Code Status:    There are no questions and answers to display.       Family History   Problem Relation Age of Onset   • Alzheimer's disease Mother    • Diabetes Father    • Stroke Father        Social History     Socioeconomic History   • Marital status:      Spouse name: Not on file   • Number of children: Not on file   • Years of education: Not on file   • Highest education level: Not on file   Tobacco Use   • Smoking status: Never Smoker   • Smokeless tobacco: Never Used   Substance  "and Sexual Activity   • Alcohol use: No   • Drug use: No   • Sexual activity: Defer       Vitals:  Ht 177.8 cm (70\")   Wt 133 kg (293 lb 6.4 oz)   BMI 42.10 kg/m²   T 97.2 P 64 R 12 BP 75/42 Sp02 98% (room air)  I/O (24Hr):  No intake or output data in the 24 hours ending 01/05/20 1452    Labs and imaging:      Lab Results (last 24 hours)     Procedure Component Value Units Date/Time    Extra Tubes [[id number]] Collected:  01/05/20 0423    Specimen:  Blood, Venous Line Updated:  01/05/20 0645    Narrative:       The following orders were created for panel order Extra Tubes.  Procedure                               Abnormality         Status                     ---------                               -----------         ------                     Lavender Top[[id number]]                                     Final result                 Please view results for these tests on the individual orders.    Lavender Top [[id number]] Collected:  01/05/20 0423    Specimen:  Blood Updated:  01/05/20 0645     Extra Tube hold for add-on     Comment: Auto resulted       Basic Metabolic Panel [[id number]]  (Abnormal) Collected:  01/05/20 0423    Specimen:  Blood Updated:  01/05/20 0527     Glucose 117 mg/dL      BUN 12 mg/dL      Creatinine 3.77 mg/dL      Sodium 137 mmol/L      Potassium 4.0 mmol/L      Chloride 97 mmol/L      CO2 25.0 mmol/L      Calcium 10.2 mg/dL      eGFR Non African Amer 16 mL/min/1.73      BUN/Creatinine Ratio 3.2     Anion Gap 15.0 mmol/L     Narrative:       GFR Normal >60  Chronic Kidney Disease <60  Kidney Failure <15      Magnesium [[id number]]  (Normal) Collected:  01/05/20 0423    Specimen:  Blood Updated:  01/05/20 0527     Magnesium 2.1 mg/dL     aPTT [[id number]]  (Abnormal) Collected:  01/05/20 0423    Specimen:  Blood Updated:  01/05/20 0519     PTT 58.2 seconds     Protime-INR [[id number]]  (Abnormal) Collected:  01/05/20 0423    Specimen:  Blood Updated:  01/05/20 0519     Protime 40.5 Seconds      " INR 3.99            Xr Chest 1 View    Result Date: 12/19/2019  Narrative: HISTORY: Verify PICC placement  CXR: A frontal view the chest obtained.  COMPARISON: 12/12/2019  FINDINGS: Left upper extremity PICC line is in place with the tip projecting over the SVC. There is a tunneled right internal jugular permacatheter. There is left subclavian cardiac pacer device. The prosthetic coronary valve with median sternotomy wires.  Cardiac silhouette is enlarged. Probable patchy left basilar atelectasis with a questionable trace left pleural effusion. No pneumothorax.      Impression: 1. Left approximately PICC line tip projecting in the SVC. Additional lines described above. 2. Cardiomegaly with probable patchy left basilar atelectasis. This report was finalized on 12/19/2019 14:54 by Dr. Ivanna Eduardo MD.    Xr Chest 1 View    Result Date: 12/12/2019  Narrative: Exam:   XR CHEST 1 VW-   Date:  12/12/2019  History:  Male, age  64 years;confirmation of all lines (ETT, tracheostomy tube, central venous catheters) on arrival  COMPARISON:  Chest x-ray dated 12/05/2018.  Findings : There is a new central venous catheter, IJ approach, terminating over the mid to low SVC region. The left-sided cardiac device with leads project over the right atrium and right ventricle. Prior aortic valve replacement. Median sternotomy wires appear intact. The heart and mediastinum are unchanged in size. Obscuration of the left lung base, concerning for consolidative process.  The bones show no acute pathology.       Impression: Impression:  1.  Lines as described above. 2.  Obscuration of the left lung base, concerning for a left lower lobe consolidative process.  This report was finalized on 12/12/2019 20:39 by Dr. Sana Terrazas MD.      Physical Examination:        Physical Exam   Constitutional: He is oriented to person, place, and time. Vital signs are normal. He appears well-developed and well-nourished. He is cooperative.   HENT:    Head: Normocephalic and atraumatic.   Nose: Nose normal.   Mouth/Throat: Oropharynx is clear and moist.   Eyes: Pupils are equal, round, and reactive to light. Conjunctivae, EOM and lids are normal.   Neck: Trachea normal and normal range of motion. Neck supple.   Cardiovascular: Normal rate, regular rhythm and normal heart sounds.   Pulmonary/Chest: Effort normal and breath sounds normal.   Abdominal: Soft. Normal appearance and bowel sounds are normal.   Obese   Musculoskeletal: Normal range of motion. He exhibits edema (diffuse).   Generalized weakness   Neurological: He is alert and oriented to person, place, and time. He has normal strength. No cranial nerve deficit or sensory deficit.   Skin: Skin is warm, dry and intact. No petechiae and no rash noted. No cyanosis or erythema. Nails show no clubbing.   Scattered ecchymosis bilateral upper extremities  Jaundice  BLE discolored   Psychiatric: He has a normal mood and affect. His speech is normal and behavior is normal. Judgment and thought content normal. Cognition and memory are normal.   Nursing note and vitals reviewed.        Assessment:             * No active hospital problems. *    Past Medical History:   Diagnosis Date   • Atelectasis, left 12/9/2018   • Cerebrovascular disease, acute    • Chest pain    • Chronic atrial fibrillation (CMS/HCC)      ablation X 3; followed by Dr. Perez chronically anticoagulated with Coumadin   • Class 3 severe obesity with serious comorbidity and body mass index (BMI) of 40.0 to 44.9 in adult (CMS/HCC) 12/9/2018   • COPD (chronic obstructive pulmonary disease) (CMS/HCC)    • Diaphragm dysfunction 12/9/2018   • Glaucoma    • Hypertrophic cardiomyopathy (CMS/HCC)    • Intermittent palpitations    • Lung nodule 6/24/2019   • Mitral valve replaced    • Mixed hyperlipidemia    • Moderate persistent asthma without complication 12/9/2018   • Obstructive sleep apnea 12/9/2018   • Obstructive sleep apnea, adult    • Pacemaker     • Rash 6/24/2019    Arms and legs   • Restrictive lung disease 12/9/2018   • Stroke (CMS/HCC)     x2        Plan:        1. Acute on chronic diastolic CHF  2. Acute kidney injury on CKD4  3. Multifactorial anemia  4. Chronic anticoagulation  5. MINH  6. COPD  7. Hx CVA  8. Hypertrophic cardiomyopathy  9. Hx MVR  10. Atrial fibrillation  11. PATTERSON with cirrhosis  12. Hyperthyroidism  13. Thrombocytopenia, most likely secondary to liver disease and consumption due to acute illness  14. Supratherapeutic INR     Continue current treatment. Monitor counts. Increase activity as tolerated. Aggressive therapies as tolerated. Labs with HD Monday. Continue blood pressure support as needed with levophed. Appreciate cardiology input. Hold coumadin for inr > 3.5.  HD per nephrology. Monitor platelets closely. Encourage compliance with fluid restriction. Extensive conversation with family and patient on the importance of compliance with fluid restriction.        Electronically signed by GLORIA Pinedo on 1/5/2020 at 2:52 PM     I have discussed the care of Pranay Gutierrez, including pertinent history and exam findings, with the nurse practitioner.    I have seen and examined the patient and the key elements of all parts of the encounter have been performed by me.  I agree with the assessment, plan and orders as documented by Janet CASTRO, after I modified the exam findings and the plan of treatments and the final version is my approved version of the assessment.        Electronically signed by Maxwell Gallego MD on 1/5/2020 at 9:14 PM

## 2020-01-05 NOTE — PROGRESS NOTES
Nephrology ([facility]) Progress Note      Patient:  Pranay Gutierrez  YOB: 1955  Date of Service: 1/5/2020  MRN: [id number]   Acct: [id number]   Primary Care Physician: Dwaine Wiley PA-C  Advance Directive:   There are no questions and answers to display.     Admit Date: 12/12/2019       Hospital Day: 0  Referring Provider: Maxwell Gallego MD      Patient personally seen and examined.  Complete chart including Consults, Notes, Operative Reports, Labs, Cardiology, and Radiology studies reviewed as able.        Subjective:  Pranay Gutierrez is a 64 y.o. male  whom we were consulted for SARAH on HD.  First HD 10/27.  Also required CVVHD for a time.  Transferred to [facility] 12/12 for continued care.  Pt recalls no prior nephrology issues prior to SARAH requiring HD.  Notes/labs all reviewed as available.  Recalls no recent issues with dialysis.  Edema improved with RRT.  Denies cp/soa/n/v/cough.       Today, no new events.  On Levophed drip at this time. Completed HD today without issue aside from levophed usage.  Edema has been improving.    Temp- 97.2  Pulse- 64  Resp- 12  BP- 75/42  O2%- 98        Allergies:  Adhesive tape; Amoxicillin; Silicone; Penicillins; and Sulfamethoxazole-trimethoprim    Home Meds:  Medications Prior to Admission   Medication Sig Dispense Refill Last Dose   • ASMANEX 120 METERED DOSES 220 MCG/INH inhaler INHALE TWO PUFFS BY MOUTH TWICE A DAY 1 inhaler 11 Taking   • atorvastatin (LIPITOR) 20 MG tablet Take 20 mg by mouth Daily.   Taking   • citalopram (CeleXA) 20 MG tablet Take 20 mg by mouth Daily.   Taking   • desonide (DESOWEN) 0.05 % cream Apply 1 application topically 2 (Two) Times a Day.   Taking   • dofetilide (TIKOSYN) 250 MCG capsule Take 1 capsule by mouth Every 12 (Twelve) Hours. (Patient taking differently: Take 500 mcg by mouth Every 12 (Twelve) Hours.) 60 capsule 11 Taking   • furosemide (LASIX) 80 MG tablet Take 80 mg by mouth Daily.    Taking   • ipratropium (ATROVENT) 0.02 % nebulizer solution Take 500 mcg by nebulization 4 (Four) Times a Day.   Taking   • latanoprost (XALATAN) 0.005 % ophthalmic solution 1 drop Every Night.   Taking   • levalbuterol (XOPENEX HFA) 45 MCG/ACT inhaler Inhale 1-2 puffs Every 4 (Four) Hours As Needed for Wheezing.   Taking   • levalbuterol (XOPENEX) 1.25 MG/3ML nebulizer solution Take 3 mL by nebulization 3 (Three) Times a Day As Needed for Wheezing.   Taking   • magnesium oxide (MAGOX) 400 (241.3 Mg) MG tablet tablet Take 400 mg by mouth 2 (Two) Times a Day.   Taking   • Melatonin 10 MG tablet Take 10 mg by mouth Every Night.   Taking   • metOLazone (ZAROXOLYN) 2.5 MG tablet Take 2.5 mg by mouth Daily. 1 TABLET PO EVERY Monday, Wednesday & Friday   Taking   • metoprolol succinate XL (TOPROL-XL) 50 MG 24 hr tablet Take 75 mg by mouth 2 (Two) Times a Day.   Taking   • Metoprolol Tartrate 75 MG tablet Take 75 mg by mouth 2 (Two) Times a Day.   Taking   • Mometasone Furoate (ASMANEX HFA) 200 MCG/ACT aerosol Inhale 2 puffs 2 (Two) Times a Day.   Taking   • potassium chloride (K-DUR) 10 MEQ CR tablet Take 10 mEq by mouth 2 (Two) Times a Day.   Taking   • potassium chloride (K-DUR,KLOR-CON) 20 MEQ CR tablet Take 20 mEq by mouth 2 (Two) Times a Day. 30 mg am & 30 mg pm   Taking   • propylthiouracil (PTU) 50 MG tablet Take 50 mg by mouth Daily.   Taking   • spironolactone (ALDACTONE) 25 MG tablet Take 25 mg by mouth Daily.   Taking   • warfarin (COUMADIN) 2 MG tablet Take 2.5 mg by mouth Daily.   Taking       Medicines:  Current Facility-Administered Medications   Medication Dose Route Frequency Provider Last Rate Last Dose   • acetaminophen (TYLENOL) tablet 500 mg  500 mg Oral Q6H PRN Maxwell Gallego MD       • albumin human 25 % IV SOLN 12.5 g  12.5 g Intravenous Q5 Min PRN Renny Floyd MD       • b complex-vitamin c-folic acid (NEPHRO-JENA) tablet 1 tablet  1 tablet Oral Daily Maxwell Gallego,  MD       • bumetanide (BUMEX) 10 mg in sodium chloride 0.9 % 100 mL (0.1 mg/mL) infusion  0.5 mg/hr Intravenous Continuous Adriel Viera MD       • dextrose (D50W) 25 g/ 50mL Intravenous Solution 25 g  25 g Intravenous Q15 Min PRN Maxwell Gallego MD       • dextrose (GLUTOSE) oral gel 15 g  15 g Oral Q15 Min PRN Maxwell Gallego MD       • epoetin haylee (EPOGEN,PROCRIT) injection 10,000 Units  10,000 Units Subcutaneous Weekly Sravan Adam MD       • flecainide (TAMBOCOR) tablet 25 mg  25 mg Oral Q12H Maxwell Gallego MD       • folic acid (FOLVITE) tablet 1 mg  1 mg Oral Daily Maxwell Gallego MD       • glucagon (human recombinant) (GLUCAGEN DIAGNOSTIC) injection 1 mg  1 mg Subcutaneous Q15 Min PRN Maxwell Gallego MD       • heparin (porcine) injection 2,000 Units  2,000 Units Intracatheter PRN Renny Floyd MD   2,000 Units at 12/18/19 1220   • heparin (porcine) injection 2,000 Units  2,000 Units Intracatheter PRN Renny Floyd MD   2,000 Units at 12/18/19 1221   • heparin (porcine) injection 4,100 Units  4,100 Units Intravenous Once Adriel Viera MD       • heparin (porcine) injection 4,100 Units  4,100 Units Intravenous Once Adriel Viera MD       • hypromellose (ISOPTO TEARS) 0.5 % ophthalmic solution 1 drop  1 drop Both Eyes Q4H PRN Maxwell Gallego MD       • lactulose solution 20 g  20 g Oral Daily Maxwell Gallego MD       • latanoprost (XALATAN) 0.005 % ophthalmic solution 1 drop  1 drop Left Eye Nightly Maxwell Gallego MD       • melatonin tablet 6 mg  6 mg Oral Nightly Mxawell Gallego MD       • midodrine (PROAMATINE) tablet 10 mg  10 mg Oral Q8H Sravan Adam MD       • norepinephrine (LEVOPHED) 8,000 mcg in sodium chloride 0.9 % 250 mL (32 mcg/mL) infusion  0.02-0.3 mcg/kg/min Intravenous Titrated Maxwell Gallego MD       • ondansetron (ZOFRAN) tablet 4 mg  4 mg Oral Q6H PRN  Maxwell Gallego MD        Or   • ondansetron (ZOFRAN) injection 4 mg  4 mg Intravenous Q6H PRN Maxwell Gallego MD       • pantoprazole (PROTONIX) EC tablet 40 mg  40 mg Oral Q AM Maxwell Gallego MD       • riFAXIMin (XIFAXAN) tablet 550 mg  550 mg Oral Q12H Maxwell Gallego MD       • rOPINIRole (REQUIP) tablet 0.25 mg  0.25 mg Oral TID Maxwell Gallego MD       • sodium chloride nasal spray 1 spray  1 spray Each Nare Q1H PRN Maxwell Gallego MD       • vitamin A & D ointment   Topical Q12H Maxwell Gallego MD       • Meseret's amazing butt cream   Topical PRN Celina Cedeno, APRN       • Meseret's amazing butt cream   Topical TID Celina Cedeno, APRN       • Meseret's amazing butt cream   Topical BID Celina Cedeno, APRN       • warfarin (COUMADIN) tablet 1 mg  1 mg Oral Daily Maxwell Gallego MD           Past Medical History:  Past Medical History:   Diagnosis Date   • Atelectasis, left 12/9/2018   • Cerebrovascular disease, acute    • Chest pain    • Chronic atrial fibrillation (CMS/HCC)      ablation X 3; followed by Dr. Perez chronically anticoagulated with Coumadin   • Class 3 severe obesity with serious comorbidity and body mass index (BMI) of 40.0 to 44.9 in adult (CMS/HCC) 12/9/2018   • COPD (chronic obstructive pulmonary disease) (CMS/HCC)    • Diaphragm dysfunction 12/9/2018   • Glaucoma    • Hypertrophic cardiomyopathy (CMS/HCC)    • Intermittent palpitations    • Lung nodule 6/24/2019   • Mitral valve replaced    • Mixed hyperlipidemia    • Moderate persistent asthma without complication 12/9/2018   • Obstructive sleep apnea 12/9/2018   • Obstructive sleep apnea, adult    • Pacemaker    • Rash 6/24/2019    Arms and legs   • Restrictive lung disease 12/9/2018   • Stroke (CMS/HCC)     x2       Past Surgical History:  Past Surgical History:   Procedure Laterality Date   • APPENDECTOMY     • CARDIAC ABLATION  01/03/2012   • CORONARY ARTERY BYPASS  GRAFT     • HEMORROIDECTOMY     • MITRAL VALVE REPLACEMENT     • PACEMAKER IMPLANTATION      Cardiac Pacemaker Insertion   • VASECTOMY         Family History  Family History   Problem Relation Age of Onset   • Alzheimer's disease Mother    • Diabetes Father    • Stroke Father        Social History  Social History     Socioeconomic History   • Marital status:      Spouse name: Not on file   • Number of children: Not on file   • Years of education: Not on file   • Highest education level: Not on file   Tobacco Use   • Smoking status: Never Smoker   • Smokeless tobacco: Never Used   Substance and Sexual Activity   • Alcohol use: No   • Drug use: No   • Sexual activity: Defer         Review of Systems:  History obtained from chart review and the patient  General ROS: No fever or chills  Respiratory ROS: No cough, shortness of breath, wheezing  Cardiovascular ROS: No chest pain or palpitations  Gastrointestinal ROS: No abdominal pain or melena  Genito-Urinary ROS: No dysuria or hematuria    Objective:  No data found.  No intake or output data in the 24 hours ending 01/05/20 0002  General: awake/alert   Chest:  clear to auscultation bilaterally without respiratory distress  CVS: regular rate and rhythm  Abdominal: soft, nontender, positive bowel sounds  Extremities: ble edema  Skin: warm/dry with ble stasis changes      Labs:  Results from last 7 days   Lab Units 12/30/19  0459   WBC 10*3/mm3 6.48   HEMOGLOBIN g/dL 8.7*   HEMATOCRIT % 26.4*   PLATELETS 10*3/mm3 75*         Results from last 7 days   Lab Units 01/04/20  0433 01/03/20  0500 01/02/20  0502   SODIUM mmol/L 138 135* 137   POTASSIUM mmol/L 3.8 4.3 4.3   CHLORIDE mmol/L 99 96* 99   CO2 mmol/L 25.0 25.0 25.0   BUN mg/dL 12 17 14   CREATININE mg/dL 4.23* 5.59* 4.84*   CALCIUM mg/dL 9.6 10.3 10.2   GLUCOSE mg/dL 132* 85 87       Radiology:   Imaging Results (Last 72 Hours)     ** No results found for the last 72 hours. **          Culture:  No results found  for: BLOODCX, URINECX, WOUNDCX, MRSACX, RESPCX, STOOLCX      Assessment   SARAH  CKD 3  Hepatorenal syndrome  Anasarca  Acute/chronic diastolic CHF  Anemia/thrombocytopenia    Plan:  D/w pt/nursing  HD MTFS given volume overload and HD holiday schedule completed successfully  Monitor labs  Limited UOP for last 2d, will d/c bumex drip  Encouraged compliance with fluid restriction with pt     Renny Floyd MD  1/5/2020  12:02 AM

## 2020-01-06 LAB
ANION GAP SERPL CALCULATED.3IONS-SCNC: 14 MMOL/L (ref 5–15)
APTT PPP: 60.9 SECONDS (ref 24.1–35)
BASOPHILS # BLD AUTO: 0.1 10*3/MM3 (ref 0–0.2)
BASOPHILS NFR BLD AUTO: 1.1 % (ref 0–1.5)
BUN BLD-MCNC: 17 MG/DL (ref 8–23)
BUN/CREAT SERPL: 3.5 (ref 7–25)
CALCIUM SPEC-SCNC: 10.3 MG/DL (ref 8.6–10.5)
CHLORIDE SERPL-SCNC: 97 MMOL/L (ref 98–107)
CO2 SERPL-SCNC: 26 MMOL/L (ref 22–29)
CREAT BLD-MCNC: 4.92 MG/DL (ref 0.76–1.27)
DEPRECATED RDW RBC AUTO: 72.6 FL (ref 37–54)
EOSINOPHIL # BLD AUTO: 0.22 10*3/MM3 (ref 0–0.4)
EOSINOPHIL NFR BLD AUTO: 2.5 % (ref 0.3–6.2)
ERYTHROCYTE [DISTWIDTH] IN BLOOD BY AUTOMATED COUNT: 21.8 % (ref 12.3–15.4)
GFR SERPL CREATININE-BSD FRML MDRD: 12 ML/MIN/1.73
GFR SERPL CREATININE-BSD FRML MDRD: ABNORMAL ML/MIN/{1.73_M2}
GLUCOSE BLD-MCNC: 105 MG/DL (ref 65–99)
HCT VFR BLD AUTO: 25.9 % (ref 37.5–51)
HGB BLD-MCNC: 8.7 G/DL (ref 13–17.7)
INR PPP: 3.98 (ref 0.91–1.09)
LYMPHOCYTES # BLD AUTO: 0.58 10*3/MM3 (ref 0.7–3.1)
LYMPHOCYTES NFR BLD AUTO: 6.7 % (ref 19.6–45.3)
MAGNESIUM SERPL-MCNC: 2.2 MG/DL (ref 1.6–2.4)
MCH RBC QN AUTO: 30.7 PG (ref 26.6–33)
MCHC RBC AUTO-ENTMCNC: 33.6 G/DL (ref 31.5–35.7)
MCV RBC AUTO: 91.5 FL (ref 79–97)
MONOCYTES # BLD AUTO: 1.27 10*3/MM3 (ref 0.1–0.9)
MONOCYTES NFR BLD AUTO: 14.6 % (ref 5–12)
NEUTROPHILS # BLD AUTO: 6.48 10*3/MM3 (ref 1.7–7)
NEUTROPHILS NFR BLD AUTO: 74.5 % (ref 42.7–76)
PLATELET # BLD AUTO: 76 10*3/MM3 (ref 140–450)
PMV BLD AUTO: 11.7 FL (ref 6–12)
POTASSIUM BLD-SCNC: 4.2 MMOL/L (ref 3.5–5.2)
PROTHROMBIN TIME: 40.4 SECONDS (ref 11.9–14.6)
RBC # BLD AUTO: 2.83 10*6/MM3 (ref 4.14–5.8)
SODIUM BLD-SCNC: 137 MMOL/L (ref 136–145)
WBC NRBC COR # BLD: 8.7 10*3/MM3 (ref 3.4–10.8)

## 2020-01-06 PROCEDURE — 85730 THROMBOPLASTIN TIME PARTIAL: CPT | Performed by: INTERNAL MEDICINE

## 2020-01-06 PROCEDURE — 85025 COMPLETE CBC W/AUTO DIFF WBC: CPT | Performed by: INTERNAL MEDICINE

## 2020-01-06 PROCEDURE — 97116 GAIT TRAINING THERAPY: CPT

## 2020-01-06 PROCEDURE — 80048 BASIC METABOLIC PNL TOTAL CA: CPT | Performed by: INTERNAL MEDICINE

## 2020-01-06 PROCEDURE — 97110 THERAPEUTIC EXERCISES: CPT

## 2020-01-06 PROCEDURE — 83735 ASSAY OF MAGNESIUM: CPT | Performed by: INTERNAL MEDICINE

## 2020-01-06 PROCEDURE — 97535 SELF CARE MNGMENT TRAINING: CPT

## 2020-01-06 PROCEDURE — 85610 PROTHROMBIN TIME: CPT | Performed by: INTERNAL MEDICINE

## 2020-01-06 PROCEDURE — 25010000002 EPOETIN ALFA PER 1000 UNITS: Performed by: INTERNAL MEDICINE

## 2020-01-06 PROCEDURE — 97530 THERAPEUTIC ACTIVITIES: CPT

## 2020-01-06 RX ORDER — WARFARIN SODIUM 2 MG/1
1 TABLET ORAL
Status: DISCONTINUED | OUTPATIENT
Start: 2020-01-07 | End: 2020-01-07

## 2020-01-06 NOTE — PROGRESS NOTES
Nephrology ([facility]) Progress Note      Patient:  Pranay Gutierrez  YOB: 1955  Date of Service: 1/5/2020  MRN: [id number]   Acct: [id number]   Primary Care Physician: Dwaine Wiley PA-C  Advance Directive:   There are no questions and answers to display.     Admit Date: 12/12/2019       Hospital Day: 0  Referring Provider: Maxwell Gallego MD      Patient personally seen and examined.  Complete chart including Consults, Notes, Operative Reports, Labs, Cardiology, and Radiology studies reviewed as able.        Subjective:  Pranay Gutierrez is a 64 y.o. male  whom we were consulted for SARAH on HD.  First HD 10/27.  Also required CVVHD for a time.  Transferred to [facility] 12/12 for continued care.  Pt recalls no prior nephrology issues prior to SARAH requiring HD.  Notes/labs all reviewed as available.  Recalls no recent issues with dialysis.  Edema improved with RRT.  Denies cp/soa/n/v/cough.       Today, no new events.  On Levophed drip at this time. Completed HD this week without issue aside from levophed usage.  Edema has been slowly improving.    Temp- 98.5  Pulse- 73  Resp- 24  BP- 93/44  O2%- 95        Allergies:  Adhesive tape; Amoxicillin; Silicone; Penicillins; and Sulfamethoxazole-trimethoprim    Home Meds:  Medications Prior to Admission   Medication Sig Dispense Refill Last Dose   • ASMANEX 120 METERED DOSES 220 MCG/INH inhaler INHALE TWO PUFFS BY MOUTH TWICE A DAY 1 inhaler 11 Taking   • atorvastatin (LIPITOR) 20 MG tablet Take 20 mg by mouth Daily.   Taking   • citalopram (CeleXA) 20 MG tablet Take 20 mg by mouth Daily.   Taking   • desonide (DESOWEN) 0.05 % cream Apply 1 application topically 2 (Two) Times a Day.   Taking   • dofetilide (TIKOSYN) 250 MCG capsule Take 1 capsule by mouth Every 12 (Twelve) Hours. (Patient taking differently: Take 500 mcg by mouth Every 12 (Twelve) Hours.) 60 capsule 11 Taking   • furosemide (LASIX) 80 MG tablet Take 80 mg by mouth  Daily.   Taking   • ipratropium (ATROVENT) 0.02 % nebulizer solution Take 500 mcg by nebulization 4 (Four) Times a Day.   Taking   • latanoprost (XALATAN) 0.005 % ophthalmic solution 1 drop Every Night.   Taking   • levalbuterol (XOPENEX HFA) 45 MCG/ACT inhaler Inhale 1-2 puffs Every 4 (Four) Hours As Needed for Wheezing.   Taking   • levalbuterol (XOPENEX) 1.25 MG/3ML nebulizer solution Take 3 mL by nebulization 3 (Three) Times a Day As Needed for Wheezing.   Taking   • magnesium oxide (MAGOX) 400 (241.3 Mg) MG tablet tablet Take 400 mg by mouth 2 (Two) Times a Day.   Taking   • Melatonin 10 MG tablet Take 10 mg by mouth Every Night.   Taking   • metOLazone (ZAROXOLYN) 2.5 MG tablet Take 2.5 mg by mouth Daily. 1 TABLET PO EVERY Monday, Wednesday & Friday   Taking   • metoprolol succinate XL (TOPROL-XL) 50 MG 24 hr tablet Take 75 mg by mouth 2 (Two) Times a Day.   Taking   • Metoprolol Tartrate 75 MG tablet Take 75 mg by mouth 2 (Two) Times a Day.   Taking   • Mometasone Furoate (ASMANEX HFA) 200 MCG/ACT aerosol Inhale 2 puffs 2 (Two) Times a Day.   Taking   • potassium chloride (K-DUR) 10 MEQ CR tablet Take 10 mEq by mouth 2 (Two) Times a Day.   Taking   • potassium chloride (K-DUR,KLOR-CON) 20 MEQ CR tablet Take 20 mEq by mouth 2 (Two) Times a Day. 30 mg am & 30 mg pm   Taking   • propylthiouracil (PTU) 50 MG tablet Take 50 mg by mouth Daily.   Taking   • spironolactone (ALDACTONE) 25 MG tablet Take 25 mg by mouth Daily.   Taking   • warfarin (COUMADIN) 2 MG tablet Take 2.5 mg by mouth Daily.   Taking       Medicines:  Current Facility-Administered Medications   Medication Dose Route Frequency Provider Last Rate Last Dose   • acetaminophen (TYLENOL) tablet 500 mg  500 mg Oral Q6H PRN Maxwell Gallego MD       • albumin human 25 % IV SOLN 12.5 g  12.5 g Intravenous Q5 Min PRN Renny Floyd MD       • b complex-vitamin c-folic acid (NEPHRO-JENA) tablet 1 tablet  1 tablet Oral Daily Maxwell Gallego  MD Vic       • bumetanide (BUMEX) 10 mg in sodium chloride 0.9 % 100 mL (0.1 mg/mL) infusion  0.5 mg/hr Intravenous Continuous Adriel Viera MD       • dextrose (D50W) 25 g/ 50mL Intravenous Solution 25 g  25 g Intravenous Q15 Min PRN Maxwell Gallego MD       • dextrose (GLUTOSE) oral gel 15 g  15 g Oral Q15 Min PRN Maxwell Gallego MD       • epoetin haylee (EPOGEN,PROCRIT) injection 10,000 Units  10,000 Units Subcutaneous Weekly Sravan Adam MD       • flecainide (TAMBOCOR) tablet 25 mg  25 mg Oral Q12H Maxwell Gallego MD       • folic acid (FOLVITE) tablet 1 mg  1 mg Oral Daily Maxwell Gallego MD       • glucagon (human recombinant) (GLUCAGEN DIAGNOSTIC) injection 1 mg  1 mg Subcutaneous Q15 Min PRN Maxwell Gallego MD       • heparin (porcine) injection 2,000 Units  2,000 Units Intracatheter PRN Renny Floyd MD   2,000 Units at 12/18/19 1220   • heparin (porcine) injection 2,000 Units  2,000 Units Intracatheter PRN Renny Floyd MD   2,000 Units at 12/18/19 1221   • heparin (porcine) injection 4,100 Units  4,100 Units Intravenous Once Adriel Viera MD       • heparin (porcine) injection 4,100 Units  4,100 Units Intravenous Once Adriel Viera MD       • hypromellose (ISOPTO TEARS) 0.5 % ophthalmic solution 1 drop  1 drop Both Eyes Q4H PRN Maxwell Gallego MD       • lactulose solution 20 g  20 g Oral Daily Maxwell Gallego MD       • latanoprost (XALATAN) 0.005 % ophthalmic solution 1 drop  1 drop Left Eye Nightly Maxwell Gallego MD       • melatonin tablet 6 mg  6 mg Oral Nightly Maxwell Gallego MD       • midodrine (PROAMATINE) tablet 10 mg  10 mg Oral Q8H Sravan Adam MD       • norepinephrine (LEVOPHED) 8,000 mcg in sodium chloride 0.9 % 250 mL (32 mcg/mL) infusion  0.02-0.3 mcg/kg/min Intravenous Titrated Maxwell Gallego MD       • ondansetron (ZOFRAN) tablet 4 mg  4 mg Oral Q6H  PRN Maxwell Gallego MD        Or   • ondansetron (ZOFRAN) injection 4 mg  4 mg Intravenous Q6H PRN Maxwell Gallego MD       • pantoprazole (PROTONIX) EC tablet 40 mg  40 mg Oral Q AM Maxwell Gallego MD       • riFAXIMin (XIFAXAN) tablet 550 mg  550 mg Oral Q12H Maxwell Gallego MD       • rOPINIRole (REQUIP) tablet 0.25 mg  0.25 mg Oral TID Maxwell Gallego MD       • sodium chloride nasal spray 1 spray  1 spray Each Nare Q1H PRN Maxwell Gallego MD       • vitamin A & D ointment   Topical Q12H Maxwell Gallego MD       • Meseret's amazing butt cream   Topical PRN Celina Cedeno, APRN       • Meseret's amazing butt cream   Topical TID Celina Cedeno, APRN       • Meseret's amazing butt cream   Topical BID Celina Cedeno, APRN       • warfarin (COUMADIN) tablet 1 mg  1 mg Oral Daily Maxwell Gallego MD           Past Medical History:  Past Medical History:   Diagnosis Date   • Atelectasis, left 12/9/2018   • Cerebrovascular disease, acute    • Chest pain    • Chronic atrial fibrillation (CMS/HCC)      ablation X 3; followed by Dr. Perez chronically anticoagulated with Coumadin   • Class 3 severe obesity with serious comorbidity and body mass index (BMI) of 40.0 to 44.9 in adult (CMS/HCC) 12/9/2018   • COPD (chronic obstructive pulmonary disease) (CMS/HCC)    • Diaphragm dysfunction 12/9/2018   • Glaucoma    • Hypertrophic cardiomyopathy (CMS/HCC)    • Intermittent palpitations    • Lung nodule 6/24/2019   • Mitral valve replaced    • Mixed hyperlipidemia    • Moderate persistent asthma without complication 12/9/2018   • Obstructive sleep apnea 12/9/2018   • Obstructive sleep apnea, adult    • Pacemaker    • Rash 6/24/2019    Arms and legs   • Restrictive lung disease 12/9/2018   • Stroke (CMS/HCC)     x2       Past Surgical History:  Past Surgical History:   Procedure Laterality Date   • APPENDECTOMY     • CARDIAC ABLATION  01/03/2012   • CORONARY ARTERY  BYPASS GRAFT     • HEMORROIDECTOMY     • MITRAL VALVE REPLACEMENT     • PACEMAKER IMPLANTATION      Cardiac Pacemaker Insertion   • VASECTOMY         Family History  Family History   Problem Relation Age of Onset   • Alzheimer's disease Mother    • Diabetes Father    • Stroke Father        Social History  Social History     Socioeconomic History   • Marital status:      Spouse name: Not on file   • Number of children: Not on file   • Years of education: Not on file   • Highest education level: Not on file   Tobacco Use   • Smoking status: Never Smoker   • Smokeless tobacco: Never Used   Substance and Sexual Activity   • Alcohol use: No   • Drug use: No   • Sexual activity: Defer         Review of Systems:  History obtained from chart review and the patient  General ROS: No fever or chills  Respiratory ROS: No cough, shortness of breath, wheezing  Cardiovascular ROS: No chest pain or palpitations  Gastrointestinal ROS: No abdominal pain or melena  Genito-Urinary ROS: No dysuria or hematuria    Objective:  No data found.  No intake or output data in the 24 hours ending 01/05/20 1809  General: awake/alert   Chest:  clear to auscultation bilaterally without respiratory distress  CVS: regular rate and rhythm  Abdominal: soft, nontender, positive bowel sounds  Extremities: ble edema  Skin: warm/dry with ble stasis changes      Labs:  Results from last 7 days   Lab Units 12/30/19  0459   WBC 10*3/mm3 6.48   HEMOGLOBIN g/dL 8.7*   HEMATOCRIT % 26.4*   PLATELETS 10*3/mm3 75*         Results from last 7 days   Lab Units 01/05/20  0423 01/04/20  0433 01/03/20  0500   SODIUM mmol/L 137 138 135*   POTASSIUM mmol/L 4.0 3.8 4.3   CHLORIDE mmol/L 97* 99 96*   CO2 mmol/L 25.0 25.0 25.0   BUN mg/dL 12 12 17   CREATININE mg/dL 3.77* 4.23* 5.59*   CALCIUM mg/dL 10.2 9.6 10.3   GLUCOSE mg/dL 117* 132* 85       Radiology:   Imaging Results (Last 72 Hours)     ** No results found for the last 72 hours. **          Culture:  No  results found for: BLOODCX, URINECX, WOUNDCX, MRSACX, RESPCX, STOOLCX      Assessment   SARAH  CKD 3  Hepatorenal syndrome  Anasarca  Acute/chronic diastolic CHF  Anemia/thrombocytopenia    Plan:  D/w pt/nursing/family  HD MTFS given volume overload and HD holiday schedule this week, will plan next HD 1/6  Monitor labs  Encouraged compliance with fluid restriction with pt / family    Renny Floyd MD  1/5/2020  6:09 PM

## 2020-01-06 NOTE — PROGRESS NOTES
[facility]  RA Peterson M.D.  GLORIA Shafer APRN      Internal Medicine Progress Note    1/6/2020   2:02 PM    Name:  Pranay Gutierrez  MRN:    [id number]     Acct:     [id number]   Room:  [address] [place] [address] Day: 0     Admit Date: 12/12/2019  3:06 PM  PCP: Dwaine Wiley PA-C    Subjective:     C/C: Continued HD and rehabilitation    Interval History: Status: Improved. Resting in bed. No family at bedside. Seen on HD. Asking for ice chips or a popsicle. Checked with nurse - patient has had over 500 ml in already today. Remains noncompliant/manipulative with fluid and dietary restrictions. INR supratherapeutic.       Review of Systems   Constitution: Positive for malaise/fatigue. Negative for chills, decreased appetite and fever.   HENT: Negative for congestion, hoarse voice, nosebleeds and sore throat.    Eyes: Negative for blurred vision, discharge, pain and visual disturbance.   Cardiovascular: Negative for chest pain, dyspnea on exertion, irregular heartbeat, leg swelling, orthopnea and palpitations.   Respiratory: Negative for cough, shortness of breath, sputum production and wheezing.    Hematologic/Lymphatic: Negative for bleeding problem. Does not bruise/bleed easily.   Skin: Negative for dry skin, flushing, itching, poor wound healing, rash and suspicious lesions.        Significant ecchymosis   Musculoskeletal: Negative for arthritis, back pain, falls, joint pain, joint swelling, muscle weakness and myalgias.   Gastrointestinal: Negative for bloating, abdominal pain, change in bowel habit, diarrhea, flatus, heartburn, melena and nausea.   Genitourinary: Negative for frequency, hesitancy, incomplete emptying, pelvic pain and urgency.   Neurological: Negative for difficulty with concentration, disturbances in coordination, dizziness, headaches, numbness, paresthesias, tremors and weakness.   Psychiatric/Behavioral: Negative for altered mental status,  hallucinations and memory loss. The patient is not nervous/anxious.          Medications:     Allergies:   Allergies   Allergen Reactions   • Adhesive Tape Other (See Comments)   • Amoxicillin Other (See Comments)     unknkown   • Silicone Other (See Comments)     unknown   • Penicillins Hives   • Sulfamethoxazole-Trimethoprim Nausea Only       Current Meds:   Current Facility-Administered Medications:   •  acetaminophen (TYLENOL) tablet 500 mg, 500 mg, Oral, Q6H PRN, Maxwell Gallego MD  •  albumin human 25 % IV SOLN 12.5 g, 12.5 g, Intravenous, Q5 Min PRN, Renny Floyd MD  •  b complex-vitamin c-folic acid (NEPHRO-JENA) tablet 1 tablet, 1 tablet, Oral, Daily, Maxwell Gallego MD  •  bumetanide (BUMEX) 10 mg in sodium chloride 0.9 % 100 mL (0.1 mg/mL) infusion, 0.5 mg/hr, Intravenous, Continuous, Adriel Viera MD  •  dextrose (D50W) 25 g/ 50mL Intravenous Solution 25 g, 25 g, Intravenous, Q15 Min PRN, Maxwell Gallego MD  •  dextrose (GLUTOSE) oral gel 15 g, 15 g, Oral, Q15 Min PRN, Maxwell Gallego MD  •  epoetin haylee (EPOGEN,PROCRIT) injection 10,000 Units, 10,000 Units, Subcutaneous, Weekly, Sravan Adam MD  •  flecainide (TAMBOCOR) tablet 25 mg, 25 mg, Oral, Q12H, Maxwell Gallego MD  •  folic acid (FOLVITE) tablet 1 mg, 1 mg, Oral, Daily, Maxwell Gallego MD  •  glucagon (human recombinant) (GLUCAGEN DIAGNOSTIC) injection 1 mg, 1 mg, Subcutaneous, Q15 Min PRN, Maxwell Gallego MD  •  heparin (porcine) injection 2,000 Units, 2,000 Units, Intracatheter, PRN, Renny Floyd MD, 2,000 Units at 12/18/19 1220  •  heparin (porcine) injection 2,000 Units, 2,000 Units, Intracatheter, PRN, Renny Floyd MD, 2,000 Units at 12/18/19 1221  •  heparin (porcine) injection 4,100 Units, 4,100 Units, Intravenous, Once, Adriel Viera MD  •  heparin (porcine) injection 4,100 Units, 4,100 Units, Intravenous, Once, Aylin,  Adriel Gaines MD  •  hypromellose (ISOPTO TEARS) 0.5 % ophthalmic solution 1 drop, 1 drop, Both Eyes, Q4H PRN, Maxwell Gallego MD  •  lactulose solution 20 g, 20 g, Oral, Daily, Maxwell Gallego MD  •  latanoprost (XALATAN) 0.005 % ophthalmic solution 1 drop, 1 drop, Left Eye, Nightly, Maxwell Gallego MD  •  melatonin tablet 6 mg, 6 mg, Oral, Nightly, Maxwell Gallego MD  •  midodrine (PROAMATINE) tablet 10 mg, 10 mg, Oral, Q8H, Sravan Adam MD  •  norepinephrine (LEVOPHED) 8,000 mcg in sodium chloride 0.9 % 250 mL (32 mcg/mL) infusion, 0.02-0.3 mcg/kg/min, Intravenous, Titrated, Maxwell Gallego MD  •  ondansetron (ZOFRAN) tablet 4 mg, 4 mg, Oral, Q6H PRN **OR** ondansetron (ZOFRAN) injection 4 mg, 4 mg, Intravenous, Q6H PRN, Maxwell Gallego MD  •  pantoprazole (PROTONIX) EC tablet 40 mg, 40 mg, Oral, Q AM, Maxwell Gallego MD  •  riFAXIMin (XIFAXAN) tablet 550 mg, 550 mg, Oral, Q12H, Maxwell Gallego MD  •  rOPINIRole (REQUIP) tablet 0.25 mg, 0.25 mg, Oral, TID, Maxwell Gallego MD  •  sodium chloride nasal spray 1 spray, 1 spray, Each Nare, Q1H PRN, Maxwell Galleog MD  •  vitamin A & D ointment, , Topical, Q12H, Maxwell Gallego MD  •  Meseret's amazing butt cream, , Topical, PRN, Celina Cedeno, APRN  •  Meseret's amazing butt cream, , Topical, TID, Celina Cedeno, APRN  •  Meseret's amazing butt cream, , Topical, BID, Celina Cedeno APRN  •  [START ON 1/7/2020] warfarin (COUMADIN) tablet 1 mg, 1 mg, Oral, Daily, Maxwell Gallego MD    Data:     Code Status:    There are no questions and answers to display.       Family History   Problem Relation Age of Onset   • Alzheimer's disease Mother    • Diabetes Father    • Stroke Father        Social History     Socioeconomic History   • Marital status:      Spouse name: Not on file   • Number of children: Not on file   • Years of education: Not on file   • Highest education  "level: Not on file   Tobacco Use   • Smoking status: Never Smoker   • Smokeless tobacco: Never Used   Substance and Sexual Activity   • Alcohol use: No   • Drug use: No   • Sexual activity: Defer       Vitals:  Ht 177.8 cm (70\")   Wt 134 kg (295 lb 8 oz)   BMI 42.40 kg/m²   T 97.5 P 63 R 19 BP 96/46 Sp02 97% (bipap)  I/O (24Hr):  No intake or output data in the 24 hours ending 01/06/20 1402    Labs and imaging:      Lab Results (last 24 hours)     Procedure Component Value Units Date/Time    CBC & Differential [[id number]] Collected:  01/06/20 0546    Specimen:  Blood Updated:  01/06/20 0640    Narrative:       The following orders were created for panel order CBC & Differential.  Procedure                               Abnormality         Status                     ---------                               -----------         ------                     CBC Auto Differential[[id number]]        Abnormal            Final result                 Please view results for these tests on the individual orders.    CBC Auto Differential [[id number]]  (Abnormal) Collected:  01/06/20 0546    Specimen:  Blood Updated:  01/06/20 0640     WBC 8.70 10*3/mm3      RBC 2.83 10*6/mm3      Hemoglobin 8.7 g/dL      Hematocrit 25.9 %      MCV 91.5 fL      MCH 30.7 pg      MCHC 33.6 g/dL      RDW 21.8 %      RDW-SD 72.6 fl      MPV 11.7 fL      Platelets 76 10*3/mm3      Neutrophil % 74.5 %      Lymphocyte % 6.7 %      Monocyte % 14.6 %      Eosinophil % 2.5 %      Basophil % 1.1 %      Neutrophils, Absolute 6.48 10*3/mm3      Lymphocytes, Absolute 0.58 10*3/mm3      Monocytes, Absolute 1.27 10*3/mm3      Eosinophils, Absolute 0.22 10*3/mm3      Basophils, Absolute 0.10 10*3/mm3     aPTT [[id number]]  (Abnormal) Collected:  01/06/20 0546    Specimen:  Blood Updated:  01/06/20 0633     PTT 60.9 seconds     Protime-INR [[id number]]  (Abnormal) Collected:  01/06/20 0546    Specimen:  Blood Updated:  01/06/20 0633     Protime 40.4 Seconds      INR " 3.98    Basic Metabolic Panel [[id number]]  (Abnormal) Collected:  01/06/20 0546    Specimen:  Blood Updated:  01/06/20 0619     Glucose 105 mg/dL      BUN 17 mg/dL      Creatinine 4.92 mg/dL      Sodium 137 mmol/L      Potassium 4.2 mmol/L      Chloride 97 mmol/L      CO2 26.0 mmol/L      Calcium 10.3 mg/dL      eGFR   Amer --     Comment: <15 Indicative of kidney failure.        eGFR Non African Amer 12 mL/min/1.73      Comment: <15 Indicative of kidney failure.        BUN/Creatinine Ratio 3.5     Anion Gap 14.0 mmol/L     Narrative:       GFR Normal >60  Chronic Kidney Disease <60  Kidney Failure <15      Magnesium [[id number]]  (Normal) Collected:  01/06/20 0546    Specimen:  Blood Updated:  01/06/20 0619     Magnesium 2.2 mg/dL             Xr Chest 1 View    Result Date: 12/19/2019  Narrative: HISTORY: Verify PICC placement  CXR: A frontal view the chest obtained.  COMPARISON: 12/12/2019  FINDINGS: Left upper extremity PICC line is in place with the tip projecting over the SVC. There is a tunneled right internal jugular permacatheter. There is left subclavian cardiac pacer device. The prosthetic coronary valve with median sternotomy wires.  Cardiac silhouette is enlarged. Probable patchy left basilar atelectasis with a questionable trace left pleural effusion. No pneumothorax.      Impression: 1. Left approximately PICC line tip projecting in the SVC. Additional lines described above. 2. Cardiomegaly with probable patchy left basilar atelectasis. This report was finalized on 12/19/2019 14:54 by Dr. Ivanna Eduardo MD.    Xr Chest 1 View    Result Date: 12/12/2019  Narrative: Exam:   XR CHEST 1 VW-   Date:  12/12/2019  History:  Male, age  64 years;confirmation of all lines (ETT, tracheostomy tube, central venous catheters) on arrival  COMPARISON:  Chest x-ray dated 12/05/2018.  Findings : There is a new central venous catheter, IJ approach, terminating over the mid to low SVC region. The left-sided cardiac  device with leads project over the right atrium and right ventricle. Prior aortic valve replacement. Median sternotomy wires appear intact. The heart and mediastinum are unchanged in size. Obscuration of the left lung base, concerning for consolidative process.  The bones show no acute pathology.       Impression: Impression:  1.  Lines as described above. 2.  Obscuration of the left lung base, concerning for a left lower lobe consolidative process.  This report was finalized on 12/12/2019 20:39 by Dr. Sana Terrazas MD.      Physical Examination:        Physical Exam   Constitutional: He is oriented to person, place, and time. Vital signs are normal. He appears well-developed and well-nourished. He is cooperative.   HENT:   Head: Normocephalic and atraumatic.   Nose: Nose normal.   Mouth/Throat: Oropharynx is clear and moist.   Eyes: Pupils are equal, round, and reactive to light. Conjunctivae, EOM and lids are normal.   Neck: Trachea normal and normal range of motion. Neck supple.   Cardiovascular: Normal rate, regular rhythm and normal heart sounds.   Pulmonary/Chest: Effort normal and breath sounds normal.   Abdominal: Soft. Normal appearance and bowel sounds are normal.   Obese   Musculoskeletal: Normal range of motion. He exhibits edema (diffuse).   Generalized weakness   Neurological: He is alert and oriented to person, place, and time. He has normal strength. No cranial nerve deficit or sensory deficit.   Skin: Skin is warm, dry and intact. No petechiae and no rash noted. No cyanosis or erythema. Nails show no clubbing.   Scattered ecchymosis bilateral upper extremities  Jaundice  BLE discolored   Psychiatric: He has a normal mood and affect. His speech is normal and behavior is normal. Judgment and thought content normal. Cognition and memory are normal.   Nursing note and vitals reviewed.        Assessment:             * No active hospital problems. *    Past Medical History:   Diagnosis Date   •  Atelectasis, left 12/9/2018   • Cerebrovascular disease, acute    • Chest pain    • Chronic atrial fibrillation (CMS/HCC)      ablation X 3; followed by Dr. Perez chronically anticoagulated with Coumadin   • Class 3 severe obesity with serious comorbidity and body mass index (BMI) of 40.0 to 44.9 in adult (CMS/HCC) 12/9/2018   • COPD (chronic obstructive pulmonary disease) (CMS/HCC)    • Diaphragm dysfunction 12/9/2018   • Glaucoma    • Hypertrophic cardiomyopathy (CMS/HCC)    • Intermittent palpitations    • Lung nodule 6/24/2019   • Mitral valve replaced    • Mixed hyperlipidemia    • Moderate persistent asthma without complication 12/9/2018   • Obstructive sleep apnea 12/9/2018   • Obstructive sleep apnea, adult    • Pacemaker    • Rash 6/24/2019    Arms and legs   • Restrictive lung disease 12/9/2018   • Stroke (CMS/HCC)     x2        Plan:        1. Acute on chronic diastolic CHF  2. Acute kidney injury on CKD4  3. Multifactorial anemia  4. Chronic anticoagulation  5. MINH  6. COPD  7. Hx CVA  8. Hypertrophic cardiomyopathy  9. Hx MVR  10. Atrial fibrillation  11. PATTERSON with cirrhosis  12. Hyperthyroidism  13. Thrombocytopenia, most likely secondary to liver disease and consumption due to acute illness  14. Supratherapeutic INR     Continue current treatment. Monitor counts. Increase activity as tolerated. Aggressive therapies as tolerated. Labs with HD Monday. Continue blood pressure support as needed with levophed. Appreciate cardiology input. Hold coumadin for inr > 3.5.  HD per nephrology. Monitor platelets closely. Encourage compliance with fluid restriction.       Electronically signed by GLORIA Huggins on 1/6/2020 at 2:02 PM     I have discussed the care of Pranay Gutierrez, including pertinent history and exam findings, with the nurse practitioner.    I have seen and examined the patient and the key elements of all parts of the encounter have been performed by me.  I agree with the assessment,  plan and orders as documented by GLORIA Shafer, after I modified the exam findings and the plan of treatments and the final version is my approved version of the assessment.        Electronically signed by Maxwell Gallego MD on 1/6/2020 at 8:28 PM

## 2020-01-07 LAB
APTT PPP: 59 SECONDS (ref 24.1–35)
INR PPP: 4.03 (ref 0.91–1.09)
PROTHROMBIN TIME: 40.8 SECONDS (ref 11.9–14.6)

## 2020-01-07 PROCEDURE — 85610 PROTHROMBIN TIME: CPT | Performed by: INTERNAL MEDICINE

## 2020-01-07 PROCEDURE — 85730 THROMBOPLASTIN TIME PARTIAL: CPT | Performed by: INTERNAL MEDICINE

## 2020-01-07 PROCEDURE — 92507 TX SP LANG VOICE COMM INDIV: CPT

## 2020-01-07 PROCEDURE — 97116 GAIT TRAINING THERAPY: CPT

## 2020-01-07 PROCEDURE — 25010000002 ONDANSETRON PER 1 MG: Performed by: INTERNAL MEDICINE

## 2020-01-07 PROCEDURE — 92526 ORAL FUNCTION THERAPY: CPT

## 2020-01-07 RX ORDER — WARFARIN SODIUM 2 MG/1
1 TABLET ORAL
Status: DISCONTINUED | OUTPATIENT
Start: 2020-01-08 | End: 2020-01-10

## 2020-01-07 NOTE — PROGRESS NOTES
Nephrology ([facility]) Progress Note      Patient:  Pranay Gutierrez  YOB: 1955  Date of Service: 1/6/2020  MRN: [id number]   Acct: [id number]   Primary Care Physician: Dwaine Wiley PA-C  Advance Directive:   There are no questions and answers to display.     Admit Date: 12/12/2019       Hospital Day: 0  Referring Provider: Maxwell Gallego MD      Patient personally seen and examined.  Complete chart including Consults, Notes, Operative Reports, Labs, Cardiology, and Radiology studies reviewed as able.        Subjective:  Pranay Gutierrez is a 64 y.o. male  whom we were consulted for SARAH on HD.  First HD 10/27.  Also required CVVHD for a time.  Transferred to [facility] 12/12 for continued care.  Pt recalls no prior nephrology issues prior to SARAH requiring HD.  Notes/labs all reviewed as available.  Recalls no recent issues with dialysis.  Edema improved with RRT.  Denies cp/soa/n/v/cough.       Today, no new events.  On Levophed drip at this time. Completed HD today without issue aside from levophed usage.  Edema has been slowly improving.  Continues to ask for water/ice/popsicles frequently    Temp- 97.5  Pulse- 63  Resp- 19  BP- 96/46  O2%- 97        Allergies:  Adhesive tape; Amoxicillin; Silicone; Penicillins; and Sulfamethoxazole-trimethoprim    Home Meds:  Medications Prior to Admission   Medication Sig Dispense Refill Last Dose   • ASMANEX 120 METERED DOSES 220 MCG/INH inhaler INHALE TWO PUFFS BY MOUTH TWICE A DAY 1 inhaler 11 Taking   • atorvastatin (LIPITOR) 20 MG tablet Take 20 mg by mouth Daily.   Taking   • citalopram (CeleXA) 20 MG tablet Take 20 mg by mouth Daily.   Taking   • desonide (DESOWEN) 0.05 % cream Apply 1 application topically 2 (Two) Times a Day.   Taking   • dofetilide (TIKOSYN) 250 MCG capsule Take 1 capsule by mouth Every 12 (Twelve) Hours. (Patient taking differently: Take 500 mcg by mouth Every 12 (Twelve) Hours.) 60 capsule 11 Taking   •  furosemide (LASIX) 80 MG tablet Take 80 mg by mouth Daily.   Taking   • ipratropium (ATROVENT) 0.02 % nebulizer solution Take 500 mcg by nebulization 4 (Four) Times a Day.   Taking   • latanoprost (XALATAN) 0.005 % ophthalmic solution 1 drop Every Night.   Taking   • levalbuterol (XOPENEX HFA) 45 MCG/ACT inhaler Inhale 1-2 puffs Every 4 (Four) Hours As Needed for Wheezing.   Taking   • levalbuterol (XOPENEX) 1.25 MG/3ML nebulizer solution Take 3 mL by nebulization 3 (Three) Times a Day As Needed for Wheezing.   Taking   • magnesium oxide (MAGOX) 400 (241.3 Mg) MG tablet tablet Take 400 mg by mouth 2 (Two) Times a Day.   Taking   • Melatonin 10 MG tablet Take 10 mg by mouth Every Night.   Taking   • metOLazone (ZAROXOLYN) 2.5 MG tablet Take 2.5 mg by mouth Daily. 1 TABLET PO EVERY Monday, Wednesday & Friday   Taking   • metoprolol succinate XL (TOPROL-XL) 50 MG 24 hr tablet Take 75 mg by mouth 2 (Two) Times a Day.   Taking   • Metoprolol Tartrate 75 MG tablet Take 75 mg by mouth 2 (Two) Times a Day.   Taking   • Mometasone Furoate (ASMANEX HFA) 200 MCG/ACT aerosol Inhale 2 puffs 2 (Two) Times a Day.   Taking   • potassium chloride (K-DUR) 10 MEQ CR tablet Take 10 mEq by mouth 2 (Two) Times a Day.   Taking   • potassium chloride (K-DUR,KLOR-CON) 20 MEQ CR tablet Take 20 mEq by mouth 2 (Two) Times a Day. 30 mg am & 30 mg pm   Taking   • propylthiouracil (PTU) 50 MG tablet Take 50 mg by mouth Daily.   Taking   • spironolactone (ALDACTONE) 25 MG tablet Take 25 mg by mouth Daily.   Taking   • warfarin (COUMADIN) 2 MG tablet Take 2.5 mg by mouth Daily.   Taking       Medicines:  Current Facility-Administered Medications   Medication Dose Route Frequency Provider Last Rate Last Dose   • acetaminophen (TYLENOL) tablet 500 mg  500 mg Oral Q6H PRN Maxwell Gallego MD       • albumin human 25 % IV SOLN 12.5 g  12.5 g Intravenous Q5 Min PRN Renny Floyd, MD       • b complex-vitamin c-folic acid (NEPHRO-JENA)  tablet 1 tablet  1 tablet Oral Daily Maxwell Gallego MD       • bumetanide (BUMEX) 10 mg in sodium chloride 0.9 % 100 mL (0.1 mg/mL) infusion  0.5 mg/hr Intravenous Continuous Adriel Viera MD       • dextrose (D50W) 25 g/ 50mL Intravenous Solution 25 g  25 g Intravenous Q15 Min PRN Maxwell Gallego MD       • dextrose (GLUTOSE) oral gel 15 g  15 g Oral Q15 Min PRN Maxwell Gallego MD       • epoetin haylee (EPOGEN,PROCRIT) injection 10,000 Units  10,000 Units Subcutaneous Weekly Sravan Adam MD       • flecainide (TAMBOCOR) tablet 25 mg  25 mg Oral Q12H Maxwell Gallego MD       • folic acid (FOLVITE) tablet 1 mg  1 mg Oral Daily Maxwell Gallego MD       • glucagon (human recombinant) (GLUCAGEN DIAGNOSTIC) injection 1 mg  1 mg Subcutaneous Q15 Min PRN Maxwell Gallego MD       • heparin (porcine) injection 2,000 Units  2,000 Units Intracatheter PRN Renny Floyd MD   2,000 Units at 12/18/19 1220   • heparin (porcine) injection 2,000 Units  2,000 Units Intracatheter PRN Renny Floyd MD   2,000 Units at 12/18/19 1221   • heparin (porcine) injection 4,100 Units  4,100 Units Intravenous Once Adriel Viera MD       • heparin (porcine) injection 4,100 Units  4,100 Units Intravenous Once Adriel Viera MD       • hypromellose (ISOPTO TEARS) 0.5 % ophthalmic solution 1 drop  1 drop Both Eyes Q4H PRN Maxwell Gallego MD       • lactulose solution 20 g  20 g Oral Daily Maxwell Gallego MD       • latanoprost (XALATAN) 0.005 % ophthalmic solution 1 drop  1 drop Left Eye Nightly Maxwell Gallego MD       • melatonin tablet 6 mg  6 mg Oral Nightly Maxwell Gallego MD       • midodrine (PROAMATINE) tablet 10 mg  10 mg Oral Q8H Sravan Adam MD       • norepinephrine (LEVOPHED) 8,000 mcg in sodium chloride 0.9 % 250 mL (32 mcg/mL) infusion  0.02-0.3 mcg/kg/min Intravenous Titrated Maxwell Gallego MD        • ondansetron (ZOFRAN) tablet 4 mg  4 mg Oral Q6H PRN Maxwell Gallego MD        Or   • ondansetron (ZOFRAN) injection 4 mg  4 mg Intravenous Q6H PRN Maxwell Gallego MD       • pantoprazole (PROTONIX) EC tablet 40 mg  40 mg Oral Q AM Maxwell Gallego MD       • riFAXIMin (XIFAXAN) tablet 550 mg  550 mg Oral Q12H Maxwell Gallego MD       • rOPINIRole (REQUIP) tablet 0.25 mg  0.25 mg Oral TID Maxwell Gallego MD       • sodium chloride nasal spray 1 spray  1 spray Each Nare Q1H PRN Maxwell Gallego MD       • vitamin A & D ointment   Topical Q12H Maxwell Gallego MD       • Meseret's amazing butt cream   Topical PRN Celina Cedeno, APRN       • Meseret's amazing butt cream   Topical TID Celina Cedeno, APRN       • Meseret's amazing butt cream   Topical BID Celina Cedeno, APRN       • [START ON 1/7/2020] warfarin (COUMADIN) tablet 1 mg  1 mg Oral Daily Maxwell Gallego MD           Past Medical History:  Past Medical History:   Diagnosis Date   • Atelectasis, left 12/9/2018   • Cerebrovascular disease, acute    • Chest pain    • Chronic atrial fibrillation (CMS/HCC)      ablation X 3; followed by Dr. Perez chronically anticoagulated with Coumadin   • Class 3 severe obesity with serious comorbidity and body mass index (BMI) of 40.0 to 44.9 in adult (CMS/HCC) 12/9/2018   • COPD (chronic obstructive pulmonary disease) (CMS/HCC)    • Diaphragm dysfunction 12/9/2018   • Glaucoma    • Hypertrophic cardiomyopathy (CMS/HCC)    • Intermittent palpitations    • Lung nodule 6/24/2019   • Mitral valve replaced    • Mixed hyperlipidemia    • Moderate persistent asthma without complication 12/9/2018   • Obstructive sleep apnea 12/9/2018   • Obstructive sleep apnea, adult    • Pacemaker    • Rash 6/24/2019    Arms and legs   • Restrictive lung disease 12/9/2018   • Stroke (CMS/HCC)     x2       Past Surgical History:  Past Surgical History:   Procedure Laterality Date   •  APPENDECTOMY     • CARDIAC ABLATION  01/03/2012   • CORONARY ARTERY BYPASS GRAFT     • HEMORROIDECTOMY     • MITRAL VALVE REPLACEMENT     • PACEMAKER IMPLANTATION      Cardiac Pacemaker Insertion   • VASECTOMY         Family History  Family History   Problem Relation Age of Onset   • Alzheimer's disease Mother    • Diabetes Father    • Stroke Father        Social History  Social History     Socioeconomic History   • Marital status:      Spouse name: Not on file   • Number of children: Not on file   • Years of education: Not on file   • Highest education level: Not on file   Tobacco Use   • Smoking status: Never Smoker   • Smokeless tobacco: Never Used   Substance and Sexual Activity   • Alcohol use: No   • Drug use: No   • Sexual activity: Defer         Review of Systems:  History obtained from chart review and the patient  General ROS: No fever or chills  Respiratory ROS: No cough, shortness of breath, wheezing  Cardiovascular ROS: No chest pain or palpitations  Gastrointestinal ROS: No abdominal pain or melena  Genito-Urinary ROS: No dysuria or hematuria    Objective:  Patient Vitals for the past 24 hrs:   Weight   01/05/20 2300 134 kg (295 lb 8 oz)     No intake or output data in the 24 hours ending 01/06/20 2137  General: awake/alert   Chest:  clear to auscultation bilaterally without respiratory distress  CVS: regular rate and rhythm  Abdominal: soft, nontender, positive bowel sounds  Extremities: ble edema  Skin: warm/dry with ble stasis changes      Labs:  Results from last 7 days   Lab Units 01/06/20  0546   WBC 10*3/mm3 8.70   HEMOGLOBIN g/dL 8.7*   HEMATOCRIT % 25.9*   PLATELETS 10*3/mm3 76*         Results from last 7 days   Lab Units 01/06/20  0546 01/05/20  0423 01/04/20  0433   SODIUM mmol/L 137 137 138   POTASSIUM mmol/L 4.2 4.0 3.8   CHLORIDE mmol/L 97* 97* 99   CO2 mmol/L 26.0 25.0 25.0   BUN mg/dL 17 12 12   CREATININE mg/dL 4.92* 3.77* 4.23*   CALCIUM mg/dL 10.3 10.2 9.6   GLUCOSE mg/dL  105* 117* 132*       Radiology:   Imaging Results (Last 72 Hours)     ** No results found for the last 72 hours. **          Culture:  No results found for: BLOODCX, URINECX, WOUNDCX, MRSACX, RESPCX, STOOLCX      Assessment   SARAH  CKD 3  Hepatorenal syndrome  Anasarca  Acute/chronic diastolic CHF  Anemia/thrombocytopenia    Plan:  D/w pt/nursing/family  HD MTFS given volume overload and HD holiday schedule last week, will plan next HD 1/8  Monitor labs  Encouraged compliance with fluid restriction with pt / family again    Renny Floyd MD  1/6/2020  9:37 PM

## 2020-01-07 NOTE — PROGRESS NOTES
Nephrology ([facility]) Progress Note      Patient:  Pranay Gutierrez  YOB: 1955  Date of Service: 1/7/2020  MRN: [id number]   Acct: [id number]   Primary Care Physician: Dwaine Wiley PA-C  Advance Directive:   There are no questions and answers to display.     Admit Date: 12/12/2019       Hospital Day: 0  Referring Provider: Maxwell Gallego MD      Patient personally seen and examined.  Complete chart including Consults, Notes, Operative Reports, Labs, Cardiology, and Radiology studies reviewed as able.    Chief complaint: Acute kidney injury/dialysis dependent.    Subjective:  Pranay Gutierrez is a 64 y.o. male  whom we were consulted for SARAH on HD.  First HD 10/27.  Also required CVVHD for a time.  Transferred to [facility] 12/12 for continued care.  Pt recalls no prior nephrology issues prior to SARAH requiring HD.  Notes/labs all reviewed as available.  Recalls no recent issues with dialysis.  Edema improved with RRT.  Denies cp/soa/n/v/cough.       Patient remained at long-term [facility], currently on Levophed for a long time.  We are unable to wean off Levophed due to persistent hypotension.        Allergies:  Adhesive tape; Amoxicillin; Silicone; Penicillins; and Sulfamethoxazole-trimethoprim    Home Meds:  Medications Prior to Admission   Medication Sig Dispense Refill Last Dose   • ASMANEX 120 METERED DOSES 220 MCG/INH inhaler INHALE TWO PUFFS BY MOUTH TWICE A DAY 1 inhaler 11 Taking   • atorvastatin (LIPITOR) 20 MG tablet Take 20 mg by mouth Daily.   Taking   • citalopram (CeleXA) 20 MG tablet Take 20 mg by mouth Daily.   Taking   • desonide (DESOWEN) 0.05 % cream Apply 1 application topically 2 (Two) Times a Day.   Taking   • dofetilide (TIKOSYN) 250 MCG capsule Take 1 capsule by mouth Every 12 (Twelve) Hours. (Patient taking differently: Take 500 mcg by mouth Every 12 (Twelve) Hours.) 60 capsule 11 Taking   • furosemide (LASIX) 80 MG tablet Take 80 mg by  mouth Daily.   Taking   • ipratropium (ATROVENT) 0.02 % nebulizer solution Take 500 mcg by nebulization 4 (Four) Times a Day.   Taking   • latanoprost (XALATAN) 0.005 % ophthalmic solution 1 drop Every Night.   Taking   • levalbuterol (XOPENEX HFA) 45 MCG/ACT inhaler Inhale 1-2 puffs Every 4 (Four) Hours As Needed for Wheezing.   Taking   • levalbuterol (XOPENEX) 1.25 MG/3ML nebulizer solution Take 3 mL by nebulization 3 (Three) Times a Day As Needed for Wheezing.   Taking   • magnesium oxide (MAGOX) 400 (241.3 Mg) MG tablet tablet Take 400 mg by mouth 2 (Two) Times a Day.   Taking   • Melatonin 10 MG tablet Take 10 mg by mouth Every Night.   Taking   • metOLazone (ZAROXOLYN) 2.5 MG tablet Take 2.5 mg by mouth Daily. 1 TABLET PO EVERY Monday, Wednesday & Friday   Taking   • metoprolol succinate XL (TOPROL-XL) 50 MG 24 hr tablet Take 75 mg by mouth 2 (Two) Times a Day.   Taking   • Metoprolol Tartrate 75 MG tablet Take 75 mg by mouth 2 (Two) Times a Day.   Taking   • Mometasone Furoate (ASMANEX HFA) 200 MCG/ACT aerosol Inhale 2 puffs 2 (Two) Times a Day.   Taking   • potassium chloride (K-DUR) 10 MEQ CR tablet Take 10 mEq by mouth 2 (Two) Times a Day.   Taking   • potassium chloride (K-DUR,KLOR-CON) 20 MEQ CR tablet Take 20 mEq by mouth 2 (Two) Times a Day. 30 mg am & 30 mg pm   Taking   • propylthiouracil (PTU) 50 MG tablet Take 50 mg by mouth Daily.   Taking   • spironolactone (ALDACTONE) 25 MG tablet Take 25 mg by mouth Daily.   Taking   • warfarin (COUMADIN) 2 MG tablet Take 2.5 mg by mouth Daily.   Taking       Medicines:  Current Facility-Administered Medications   Medication Dose Route Frequency Provider Last Rate Last Dose   • acetaminophen (TYLENOL) tablet 500 mg  500 mg Oral Q6H PRN Maxwell Gallego MD       • albumin human 25 % IV SOLN 12.5 g  12.5 g Intravenous Q5 Min PRN Renny Floyd MD       • b complex-vitamin c-folic acid (NEPHRO-JENA) tablet 1 tablet  1 tablet Oral Daily Julián  Maxwell Ho MD       • bumetanide (BUMEX) 10 mg in sodium chloride 0.9 % 100 mL (0.1 mg/mL) infusion  0.5 mg/hr Intravenous Continuous Adriel Viera MD       • dextrose (D50W) 25 g/ 50mL Intravenous Solution 25 g  25 g Intravenous Q15 Min PRN Maxwell Gallgeo MD       • dextrose (GLUTOSE) oral gel 15 g  15 g Oral Q15 Min PRN Mawxell Gallego MD       • epoetin haylee (EPOGEN,PROCRIT) injection 10,000 Units  10,000 Units Subcutaneous Weekly Sravan Adam MD       • flecainide (TAMBOCOR) tablet 25 mg  25 mg Oral Q12H Maxwell Gallego MD       • folic acid (FOLVITE) tablet 1 mg  1 mg Oral Daily Maxwell Gallego MD       • glucagon (human recombinant) (GLUCAGEN DIAGNOSTIC) injection 1 mg  1 mg Subcutaneous Q15 Min PRN Maxwell Gallego MD       • heparin (porcine) injection 2,000 Units  2,000 Units Intracatheter PRN Renny Floyd MD   2,000 Units at 12/18/19 1220   • heparin (porcine) injection 2,000 Units  2,000 Units Intracatheter PRN Renny Floyd MD   2,000 Units at 12/18/19 1221   • heparin (porcine) injection 4,100 Units  4,100 Units Intravenous Once Adriel Viera MD       • heparin (porcine) injection 4,100 Units  4,100 Units Intravenous Once Adriel Viera MD       • hypromellose (ISOPTO TEARS) 0.5 % ophthalmic solution 1 drop  1 drop Both Eyes Q4H PRN Maxwell Gallego MD       • lactulose solution 20 g  20 g Oral Daily Maxwell Gallego MD       • latanoprost (XALATAN) 0.005 % ophthalmic solution 1 drop  1 drop Left Eye Nightly Maxwell Gallego MD       • melatonin tablet 6 mg  6 mg Oral Nightly Maxwell Gallego MD       • midodrine (PROAMATINE) tablet 10 mg  10 mg Oral Q8H Sravan Adam MD       • norepinephrine (LEVOPHED) 8,000 mcg in sodium chloride 0.9 % 250 mL (32 mcg/mL) infusion  0.02-0.3 mcg/kg/min Intravenous Titrated Maxwell Gallego MD       • ondansetron (ZOFRAN) tablet 4 mg  4 mg  Oral Q6H PRN Maxwell Gallego MD        Or   • ondansetron (ZOFRAN) injection 4 mg  4 mg Intravenous Q6H PRN Maxwell Gallego MD       • pantoprazole (PROTONIX) EC tablet 40 mg  40 mg Oral Q AM Maxwell Gallego MD       • riFAXIMin (XIFAXAN) tablet 550 mg  550 mg Oral Q12H Maxwell Gallego MD       • rOPINIRole (REQUIP) tablet 0.25 mg  0.25 mg Oral TID Maxwell Gallego MD       • sodium chloride nasal spray 1 spray  1 spray Each Nare Q1H PRN Maxwell Gallego MD       • vitamin A & D ointment   Topical Q12H Maxwell Gallego MD       • Meseret's amazing butt cream   Topical PRN Celina Cedeno, APRN       • Meseret's amazing butt cream   Topical TID Celina Cedeno, APRN       • Meseret's amazing butt cream   Topical BID Celina Cedeno, APRN       • [START ON 1/8/2020] warfarin (COUMADIN) tablet 1 mg  1 mg Oral Daily Maxwell Gallego MD           Past Medical History:  Past Medical History:   Diagnosis Date   • Atelectasis, left 12/9/2018   • Cerebrovascular disease, acute    • Chest pain    • Chronic atrial fibrillation (CMS/HCC)      ablation X 3; followed by Dr. Perez chronically anticoagulated with Coumadin   • Class 3 severe obesity with serious comorbidity and body mass index (BMI) of 40.0 to 44.9 in adult (CMS/HCC) 12/9/2018   • COPD (chronic obstructive pulmonary disease) (CMS/HCC)    • Diaphragm dysfunction 12/9/2018   • Glaucoma    • Hypertrophic cardiomyopathy (CMS/HCC)    • Intermittent palpitations    • Lung nodule 6/24/2019   • Mitral valve replaced    • Mixed hyperlipidemia    • Moderate persistent asthma without complication 12/9/2018   • Obstructive sleep apnea 12/9/2018   • Obstructive sleep apnea, adult    • Pacemaker    • Rash 6/24/2019    Arms and legs   • Restrictive lung disease 12/9/2018   • Stroke (CMS/HCC)     x2       Past Surgical History:  Past Surgical History:   Procedure Laterality Date   • APPENDECTOMY     • CARDIAC ABLATION   01/03/2012   • CORONARY ARTERY BYPASS GRAFT     • HEMORROIDECTOMY     • MITRAL VALVE REPLACEMENT     • PACEMAKER IMPLANTATION      Cardiac Pacemaker Insertion   • VASECTOMY         Family History  Family History   Problem Relation Age of Onset   • Alzheimer's disease Mother    • Diabetes Father    • Stroke Father        Social History  Social History     Socioeconomic History   • Marital status:      Spouse name: Not on file   • Number of children: Not on file   • Years of education: Not on file   • Highest education level: Not on file   Tobacco Use   • Smoking status: Never Smoker   • Smokeless tobacco: Never Used   Substance and Sexual Activity   • Alcohol use: No   • Drug use: No   • Sexual activity: Defer         Review of Systems:  History obtained from chart review and the patient  General ROS: No fever or chills  Respiratory ROS: No cough, shortness of breath, wheezing  Cardiovascular ROS: No chest pain or palpitations  Gastrointestinal ROS: No abdominal pain or melena  Genito-Urinary ROS: No dysuria or hematuria    Objective:  Blood pressure: 87/50 mmHg  Heart rate is 67 bpm  O2 saturation 98%.    General: awake/alert   HEENT: Normocephalic atraumatic head  Chest:  clear to auscultation bilaterally without respiratory distress  CVS: regular rate and rhythm  Abdominal: soft, nontender, positive bowel sounds  Extremities: ble edema  Skin: warm/dry with ble stasis changes      Labs:  Results from last 7 days   Lab Units 01/06/20  0546   WBC 10*3/mm3 8.70   HEMOGLOBIN g/dL 8.7*   HEMATOCRIT % 25.9*   PLATELETS 10*3/mm3 76*         Results from last 7 days   Lab Units 01/06/20  0546 01/05/20  0423 01/04/20  0433   SODIUM mmol/L 137 137 138   POTASSIUM mmol/L 4.2 4.0 3.8   CHLORIDE mmol/L 97* 97* 99   CO2 mmol/L 26.0 25.0 25.0   BUN mg/dL 17 12 12   CREATININE mg/dL 4.92* 3.77* 4.23*   CALCIUM mg/dL 10.3 10.2 9.6   GLUCOSE mg/dL 105* 117* 132*       Radiology:   Imaging Results (Last 72 Hours)     ** No  results found for the last 72 hours. **          Culture:  No results found for: BLOODCX, URINECX, WOUNDCX, MRSACX, RESPCX, STOOLCX      Assessment   1.  Acute kidney injury/dialysis dependent.  2.  Stage III chronic kidney disease baseline.  3.  Hepatorenal syndrome.  4.  Acute on chronic sy diastolic CHF.  5.  Chronic thrombocytopenia.  6.  Anasarca/volume overload.  7.  Chronic hypotension/on Levophed/midodrine.    Plan:  1.  Routine hemodialysis treatment tomorrow.  2.  Try to wean off Levophed.  3.  Continue midodrine as needed as well.      Sravan Adam MD  1/7/2020  4:36 PM

## 2020-01-07 NOTE — PROGRESS NOTES
[facility]  RA Peterson M.D.  GLORIA Shafer APRN      Internal Medicine Progress Note    1/7/2020   12:14 PM    Name:  Pranay Gutierrez  MRN:    [id number]     Acct:     [id number]   Room:  [address] [place] [address] Day: 0     Admit Date: 12/12/2019  3:06 PM  PCP: Dwaine Wiley PA-C    Subjective:     C/C: Continued HD and rehabilitation    Interval History: Status: Improved. Up to chair. No family at bedside. Remains noncompliant/manipulative with fluid and dietary restrictions. INR supratherapeutic. Tolerated HD yesterday. Slow progress with therapy.       Review of Systems   Constitution: Positive for malaise/fatigue. Negative for chills, decreased appetite and fever.   HENT: Negative for congestion, hoarse voice, nosebleeds and sore throat.    Eyes: Negative for blurred vision, discharge, pain and visual disturbance.   Cardiovascular: Negative for chest pain, dyspnea on exertion, irregular heartbeat, leg swelling, orthopnea and palpitations.   Respiratory: Negative for cough, shortness of breath, sputum production and wheezing.    Hematologic/Lymphatic: Negative for bleeding problem. Does not bruise/bleed easily.   Skin: Negative for dry skin, flushing, itching, poor wound healing, rash and suspicious lesions.        Significant ecchymosis   Musculoskeletal: Negative for arthritis, back pain, falls, joint pain, joint swelling, muscle weakness and myalgias.   Gastrointestinal: Negative for bloating, abdominal pain, change in bowel habit, diarrhea, flatus, heartburn, melena and nausea.   Genitourinary: Negative for frequency, hesitancy, incomplete emptying, pelvic pain and urgency.   Neurological: Negative for difficulty with concentration, disturbances in coordination, dizziness, headaches, numbness, paresthesias, tremors and weakness.   Psychiatric/Behavioral: Negative for altered mental status, hallucinations and memory loss. The patient is not nervous/anxious.           Medications:     Allergies:   Allergies   Allergen Reactions   • Adhesive Tape Other (See Comments)   • Amoxicillin Other (See Comments)     unknkown   • Silicone Other (See Comments)     unknown   • Penicillins Hives   • Sulfamethoxazole-Trimethoprim Nausea Only       Current Meds:   Current Facility-Administered Medications:   •  acetaminophen (TYLENOL) tablet 500 mg, 500 mg, Oral, Q6H PRN, Maxwell Gallego MD  •  albumin human 25 % IV SOLN 12.5 g, 12.5 g, Intravenous, Q5 Min PRN, Renyn Floyd MD  •  b complex-vitamin c-folic acid (NEPHRO-JENA) tablet 1 tablet, 1 tablet, Oral, Daily, Maxwell Gallego MD  •  bumetanide (BUMEX) 10 mg in sodium chloride 0.9 % 100 mL (0.1 mg/mL) infusion, 0.5 mg/hr, Intravenous, Continuous, Adriel Viera MD  •  dextrose (D50W) 25 g/ 50mL Intravenous Solution 25 g, 25 g, Intravenous, Q15 Min PRN, Maxwell Gallego MD  •  dextrose (GLUTOSE) oral gel 15 g, 15 g, Oral, Q15 Min PRN, Maxwell Gallego MD  •  epoetin haylee (EPOGEN,PROCRIT) injection 10,000 Units, 10,000 Units, Subcutaneous, Weekly, Sravan Adam MD  •  flecainide (TAMBOCOR) tablet 25 mg, 25 mg, Oral, Q12H, Maxwell Gallego MD  •  folic acid (FOLVITE) tablet 1 mg, 1 mg, Oral, Daily, Maxwell Gallego MD  •  glucagon (human recombinant) (GLUCAGEN DIAGNOSTIC) injection 1 mg, 1 mg, Subcutaneous, Q15 Min PRN, Maxwell Gallego MD  •  heparin (porcine) injection 2,000 Units, 2,000 Units, Intracatheter, PRN, Renny Floyd MD, 2,000 Units at 12/18/19 1220  •  heparin (porcine) injection 2,000 Units, 2,000 Units, Intracatheter, PRN, Renny Floyd MD, 2,000 Units at 12/18/19 1221  •  heparin (porcine) injection 4,100 Units, 4,100 Units, Intravenous, Once, Adriel Viera MD  •  heparin (porcine) injection 4,100 Units, 4,100 Units, Intravenous, Once, Adriel Viera MD  •  hypromellose (ISOPTO TEARS) 0.5 % ophthalmic  solution 1 drop, 1 drop, Both Eyes, Q4H PRN, Maxwell Gallego MD  •  lactulose solution 20 g, 20 g, Oral, Daily, Maxwell Gallego MD  •  latanoprost (XALATAN) 0.005 % ophthalmic solution 1 drop, 1 drop, Left Eye, Nightly, Maxwell Gallego MD  •  melatonin tablet 6 mg, 6 mg, Oral, Nightly, Maxwell Gallego MD  •  midodrine (PROAMATINE) tablet 10 mg, 10 mg, Oral, Q8H, Sravan Adam MD  •  norepinephrine (LEVOPHED) 8,000 mcg in sodium chloride 0.9 % 250 mL (32 mcg/mL) infusion, 0.02-0.3 mcg/kg/min, Intravenous, Titrated, Maxwell Gallego MD  •  ondansetron (ZOFRAN) tablet 4 mg, 4 mg, Oral, Q6H PRN **OR** ondansetron (ZOFRAN) injection 4 mg, 4 mg, Intravenous, Q6H PRN, Maxwell Gallego MD  •  pantoprazole (PROTONIX) EC tablet 40 mg, 40 mg, Oral, Q AM, Maxwell Gallego MD  •  riFAXIMin (XIFAXAN) tablet 550 mg, 550 mg, Oral, Q12H, Maxwell Gallego MD  •  rOPINIRole (REQUIP) tablet 0.25 mg, 0.25 mg, Oral, TID, Maxwell Gallego MD  •  sodium chloride nasal spray 1 spray, 1 spray, Each Nare, Q1H PRN, Maxwell Gallego MD  •  vitamin A & D ointment, , Topical, Q12H, Maxwell Gallego MD  •  Meseret's amazing butt cream, , Topical, PRN, Celina Cedeno, APRN  •  Meseret's amazing butt cream, , Topical, TID, Celina Cedeno, APRN  •  Meseret's amazing butt cream, , Topical, BID, Celina Cedeno, APRN  •  warfarin (COUMADIN) tablet 1 mg, 1 mg, Oral, Daily, Maxwell Gallego MD    Data:     Code Status:    There are no questions and answers to display.       Family History   Problem Relation Age of Onset   • Alzheimer's disease Mother    • Diabetes Father    • Stroke Father        Social History     Socioeconomic History   • Marital status:      Spouse name: Not on file   • Number of children: Not on file   • Years of education: Not on file   • Highest education level: Not on file   Tobacco Use   • Smoking status: Never Smoker   • Smokeless tobacco:  "Never Used   Substance and Sexual Activity   • Alcohol use: No   • Drug use: No   • Sexual activity: Defer       Vitals:  Ht 177.8 cm (70\")   Wt 134 kg (295 lb 8 oz)   BMI 42.40 kg/m²   T 98.5 P 67 R 16 BP 87/51 Sp02 98% (bipap)  I/O (24Hr):  No intake or output data in the 24 hours ending 01/07/20 1214    Labs and imaging:      Lab Results (last 24 hours)     Procedure Component Value Units Date/Time    aPTT [[id number]]  (Abnormal) Collected:  01/07/20 0421    Specimen:  Blood Updated:  01/07/20 0516     PTT 59.0 seconds     Protime-INR [[id number]]  (Abnormal) Collected:  01/07/20 0421    Specimen:  Blood Updated:  01/07/20 0516     Protime 40.8 Seconds      INR 4.03            Xr Chest 1 View    Result Date: 12/19/2019  Narrative: HISTORY: Verify PICC placement  CXR: A frontal view the chest obtained.  COMPARISON: 12/12/2019  FINDINGS: Left upper extremity PICC line is in place with the tip projecting over the SVC. There is a tunneled right internal jugular permacatheter. There is left subclavian cardiac pacer device. The prosthetic coronary valve with median sternotomy wires.  Cardiac silhouette is enlarged. Probable patchy left basilar atelectasis with a questionable trace left pleural effusion. No pneumothorax.      Impression: 1. Left approximately PICC line tip projecting in the SVC. Additional lines described above. 2. Cardiomegaly with probable patchy left basilar atelectasis. This report was finalized on 12/19/2019 14:54 by Dr. Ivanna Eduardo MD.    Xr Chest 1 View    Result Date: 12/12/2019  Narrative: Exam:   XR CHEST 1 VW-   Date:  12/12/2019  History:  Male, age  64 years;confirmation of all lines (ETT, tracheostomy tube, central venous catheters) on arrival  COMPARISON:  Chest x-ray dated 12/05/2018.  Findings : There is a new central venous catheter, IJ approach, terminating over the mid to low SVC region. The left-sided cardiac device with leads project over the right atrium and right ventricle. " Prior aortic valve replacement. Median sternotomy wires appear intact. The heart and mediastinum are unchanged in size. Obscuration of the left lung base, concerning for consolidative process.  The bones show no acute pathology.       Impression: Impression:  1.  Lines as described above. 2.  Obscuration of the left lung base, concerning for a left lower lobe consolidative process.  This report was finalized on 12/12/2019 20:39 by Dr. Sana Terrazas MD.      Physical Examination:        Physical Exam   Constitutional: He is oriented to person, place, and time. Vital signs are normal. He appears well-developed and well-nourished. He is cooperative.   HENT:   Head: Normocephalic and atraumatic.   Nose: Nose normal.   Mouth/Throat: Oropharynx is clear and moist.   Eyes: Pupils are equal, round, and reactive to light. Conjunctivae, EOM and lids are normal.   Neck: Trachea normal and normal range of motion. Neck supple.   Cardiovascular: Normal rate, regular rhythm and normal heart sounds.   Pulmonary/Chest: Effort normal and breath sounds normal.   Abdominal: Soft. Normal appearance and bowel sounds are normal.   Obese   Musculoskeletal: Normal range of motion. He exhibits edema (diffuse).   Generalized weakness   Neurological: He is alert and oriented to person, place, and time. He has normal strength. No cranial nerve deficit or sensory deficit.   Skin: Skin is warm, dry and intact. No petechiae and no rash noted. No cyanosis or erythema. Nails show no clubbing.   Scattered ecchymosis bilateral upper extremities  Jaundice  BLE discolored   Psychiatric: He has a normal mood and affect. His speech is normal and behavior is normal. Judgment and thought content normal. Cognition and memory are normal.   Nursing note and vitals reviewed.        Assessment:             * No active hospital problems. *    Past Medical History:   Diagnosis Date   • Atelectasis, left 12/9/2018   • Cerebrovascular disease, acute    • Chest  pain    • Chronic atrial fibrillation (CMS/HCC)      ablation X 3; followed by Dr. Perez chronically anticoagulated with Coumadin   • Class 3 severe obesity with serious comorbidity and body mass index (BMI) of 40.0 to 44.9 in adult (CMS/HCC) 12/9/2018   • COPD (chronic obstructive pulmonary disease) (CMS/HCC)    • Diaphragm dysfunction 12/9/2018   • Glaucoma    • Hypertrophic cardiomyopathy (CMS/HCC)    • Intermittent palpitations    • Lung nodule 6/24/2019   • Mitral valve replaced    • Mixed hyperlipidemia    • Moderate persistent asthma without complication 12/9/2018   • Obstructive sleep apnea 12/9/2018   • Obstructive sleep apnea, adult    • Pacemaker    • Rash 6/24/2019    Arms and legs   • Restrictive lung disease 12/9/2018   • Stroke (CMS/HCC)     x2        Plan:        1. Acute on chronic diastolic CHF  2. Acute kidney injury on CKD4  3. Multifactorial anemia  4. Chronic anticoagulation  5. MINH  6. COPD  7. Hx CVA  8. Hypertrophic cardiomyopathy  9. Hx MVR  10. Atrial fibrillation  11. PATTERSON with cirrhosis  12. Hyperthyroidism  13. Thrombocytopenia, most likely secondary to liver disease and consumption due to acute illness  14. Supratherapeutic INR     Continue current treatment. Monitor counts. Increase activity as tolerated. Aggressive therapies as tolerated. Labs with HD Monday. Continue blood pressure support as needed with levophed. Appreciate cardiology input. Hold coumadin for inr > 3.5.  HD per nephrology. Monitor platelets closely. Encourage compliance with fluid restriction.       Electronically signed by GLORIA Huggins on 1/7/2020 at 12:14 PM

## 2020-01-08 LAB
ANION GAP SERPL CALCULATED.3IONS-SCNC: 13 MMOL/L (ref 5–15)
APTT PPP: 60.3 SECONDS (ref 24.1–35)
BASOPHILS # BLD AUTO: 0.09 10*3/MM3 (ref 0–0.2)
BASOPHILS NFR BLD AUTO: 1.3 % (ref 0–1.5)
BUN BLD-MCNC: 16 MG/DL (ref 8–23)
BUN/CREAT SERPL: 3.4 (ref 7–25)
CALCIUM SPEC-SCNC: 10.2 MG/DL (ref 8.6–10.5)
CHLORIDE SERPL-SCNC: 96 MMOL/L (ref 98–107)
CO2 SERPL-SCNC: 26 MMOL/L (ref 22–29)
CREAT BLD-MCNC: 4.76 MG/DL (ref 0.76–1.27)
DEPRECATED RDW RBC AUTO: 72.4 FL (ref 37–54)
EOSINOPHIL # BLD AUTO: 0.22 10*3/MM3 (ref 0–0.4)
EOSINOPHIL NFR BLD AUTO: 3.1 % (ref 0.3–6.2)
ERYTHROCYTE [DISTWIDTH] IN BLOOD BY AUTOMATED COUNT: 21.7 % (ref 12.3–15.4)
GFR SERPL CREATININE-BSD FRML MDRD: 12 ML/MIN/1.73
GFR SERPL CREATININE-BSD FRML MDRD: ABNORMAL ML/MIN/{1.73_M2}
GLUCOSE BLD-MCNC: 95 MG/DL (ref 65–99)
GLUCOSE BLDC GLUCOMTR-MCNC: 118 MG/DL (ref 70–130)
HCT VFR BLD AUTO: 25 % (ref 37.5–51)
HGB BLD-MCNC: 8.3 G/DL (ref 13–17.7)
INR PPP: 3.82 (ref 0.91–1.09)
LYMPHOCYTES # BLD AUTO: 0.53 10*3/MM3 (ref 0.7–3.1)
LYMPHOCYTES NFR BLD AUTO: 7.4 % (ref 19.6–45.3)
MCH RBC QN AUTO: 30.6 PG (ref 26.6–33)
MCHC RBC AUTO-ENTMCNC: 33.2 G/DL (ref 31.5–35.7)
MCV RBC AUTO: 92.3 FL (ref 79–97)
MONOCYTES # BLD AUTO: 1.09 10*3/MM3 (ref 0.1–0.9)
MONOCYTES NFR BLD AUTO: 15.1 % (ref 5–12)
NEUTROPHILS # BLD AUTO: 5.23 10*3/MM3 (ref 1.7–7)
NEUTROPHILS NFR BLD AUTO: 72.5 % (ref 42.7–76)
PLATELET # BLD AUTO: 62 10*3/MM3 (ref 140–450)
PMV BLD AUTO: 11.3 FL (ref 6–12)
POTASSIUM BLD-SCNC: 4.1 MMOL/L (ref 3.5–5.2)
PROTHROMBIN TIME: 39.1 SECONDS (ref 11.9–14.6)
RBC # BLD AUTO: 2.71 10*6/MM3 (ref 4.14–5.8)
SODIUM BLD-SCNC: 135 MMOL/L (ref 136–145)
WBC NRBC COR # BLD: 7.2 10*3/MM3 (ref 3.4–10.8)

## 2020-01-08 PROCEDURE — 85025 COMPLETE CBC W/AUTO DIFF WBC: CPT | Performed by: INTERNAL MEDICINE

## 2020-01-08 PROCEDURE — 82962 GLUCOSE BLOOD TEST: CPT

## 2020-01-08 PROCEDURE — 85610 PROTHROMBIN TIME: CPT | Performed by: INTERNAL MEDICINE

## 2020-01-08 PROCEDURE — 80048 BASIC METABOLIC PNL TOTAL CA: CPT | Performed by: INTERNAL MEDICINE

## 2020-01-08 PROCEDURE — 85730 THROMBOPLASTIN TIME PARTIAL: CPT | Performed by: INTERNAL MEDICINE

## 2020-01-08 RX ORDER — DIAPER,BRIEF,INFANT-TODD,DISP
EACH MISCELLANEOUS EVERY 12 HOURS SCHEDULED
Status: DISCONTINUED | OUTPATIENT
Start: 2020-01-08 | End: 2020-01-13 | Stop reason: ALTCHOICE

## 2020-01-08 NOTE — PROGRESS NOTES
[facility]  RA Peterson M.D.  GLORIA Shafer APRN      Internal Medicine Progress Note    1/8/2020   12:12 PM    Name:  Pranay Gutierrez  MRN:    [id number]     Acct:     [id number]   Room:  [address] [place] [address] Day: 0     Admit Date: 12/12/2019  3:06 PM  PCP: Dwaine Wiley PA-C    Subjective:     C/C: Continued HD and rehabilitation    Interval History: Status: Improved. Resting in bed.  No family at bedside. Remains noncompliant/manipulative with fluid and dietary restrictions. INR supratherapeutic. Tolerated HD this am. Mild nausea at present time following HD. Slow progress with therapy. Skin tear to right forearm from IV removal this morning.       Review of Systems   Constitution: Positive for malaise/fatigue. Negative for chills, decreased appetite and fever.   HENT: Negative for congestion, hoarse voice, nosebleeds and sore throat.    Eyes: Negative for blurred vision, discharge, pain and visual disturbance.   Cardiovascular: Negative for chest pain, dyspnea on exertion, irregular heartbeat, leg swelling, orthopnea and palpitations.   Respiratory: Negative for cough, shortness of breath, sputum production and wheezing.    Hematologic/Lymphatic: Negative for bleeding problem. Does not bruise/bleed easily.   Skin: Negative for dry skin, flushing, itching, poor wound healing, rash and suspicious lesions.        Significant ecchymosis   Musculoskeletal: Negative for arthritis, back pain, falls, joint pain, joint swelling, muscle weakness and myalgias.   Gastrointestinal: Negative for bloating, abdominal pain, change in bowel habit, diarrhea, flatus, heartburn, melena and nausea.   Genitourinary: Negative for frequency, hesitancy, incomplete emptying, pelvic pain and urgency.   Neurological: Negative for difficulty with concentration, disturbances in coordination, dizziness, headaches, numbness, paresthesias, tremors and weakness.   Psychiatric/Behavioral:  Negative for altered mental status, hallucinations and memory loss. The patient is not nervous/anxious.      Medications:     Allergies:   Allergies   Allergen Reactions   • Adhesive Tape Other (See Comments)   • Amoxicillin Other (See Comments)     unknkown   • Silicone Other (See Comments)     unknown   • Penicillins Hives   • Sulfamethoxazole-Trimethoprim Nausea Only       Current Meds:   Current Facility-Administered Medications:   •  acetaminophen (TYLENOL) tablet 500 mg, 500 mg, Oral, Q6H PRN, Maxwell Gallego MD  •  albumin human 25 % IV SOLN 12.5 g, 12.5 g, Intravenous, Q5 Min PRN, Renny Floyd MD  •  b complex-vitamin c-folic acid (NEPHRO-JENA) tablet 1 tablet, 1 tablet, Oral, Daily, Maxwell Gallego MD  •  bumetanide (BUMEX) 10 mg in sodium chloride 0.9 % 100 mL (0.1 mg/mL) infusion, 0.5 mg/hr, Intravenous, Continuous, Adriel Viera MD  •  dextrose (D50W) 25 g/ 50mL Intravenous Solution 25 g, 25 g, Intravenous, Q15 Min PRN, Maxwell Gallego MD  •  dextrose (GLUTOSE) oral gel 15 g, 15 g, Oral, Q15 Min PRN, Maxwell Gallego MD  •  epoetin haylee (EPOGEN,PROCRIT) injection 10,000 Units, 10,000 Units, Subcutaneous, Weekly, Sravan Adam MD  •  flecainide (TAMBOCOR) tablet 25 mg, 25 mg, Oral, Q12H, Maxwell Gallego MD  •  folic acid (FOLVITE) tablet 1 mg, 1 mg, Oral, Daily, Maxwell Gallego MD  •  glucagon (human recombinant) (GLUCAGEN DIAGNOSTIC) injection 1 mg, 1 mg, Subcutaneous, Q15 Min PRN, Maxwell Gallego MD  •  heparin (porcine) injection 2,000 Units, 2,000 Units, Intracatheter, PRN, Renny Floyd MD, 2,000 Units at 12/18/19 1220  •  heparin (porcine) injection 2,000 Units, 2,000 Units, Intracatheter, PRN, Renny Floyd MD, 2,000 Units at 12/18/19 1221  •  heparin (porcine) injection 4,100 Units, 4,100 Units, Intravenous, Once, Adriel Viera MD  •  heparin (porcine) injection 4,100 Units, 4,100 Units,  Intravenous, Once, Adriel Viera MD  •  hypromellose (ISOPTO TEARS) 0.5 % ophthalmic solution 1 drop, 1 drop, Both Eyes, Q4H PRN, Maxwell Gallego MD  •  lactulose solution 20 g, 20 g, Oral, Daily, Maxwell Gallego MD  •  latanoprost (XALATAN) 0.005 % ophthalmic solution 1 drop, 1 drop, Left Eye, Nightly, Maxwell Gallego MD  •  melatonin tablet 6 mg, 6 mg, Oral, Nightly, Maxwell Gallego MD  •  midodrine (PROAMATINE) tablet 10 mg, 10 mg, Oral, Q8H, Sravan Adam MD  •  norepinephrine (LEVOPHED) 8,000 mcg in sodium chloride 0.9 % 250 mL (32 mcg/mL) infusion, 0.02-0.3 mcg/kg/min, Intravenous, Titrated, Maxwell Gallego MD  •  ondansetron (ZOFRAN) tablet 4 mg, 4 mg, Oral, Q6H PRN **OR** ondansetron (ZOFRAN) injection 4 mg, 4 mg, Intravenous, Q6H PRN, Maxwell Gallego MD  •  pantoprazole (PROTONIX) EC tablet 40 mg, 40 mg, Oral, Q AM, Maxwell Gallego MD  •  riFAXIMin (XIFAXAN) tablet 550 mg, 550 mg, Oral, Q12H, Maxwell Gallego MD  •  rOPINIRole (REQUIP) tablet 0.25 mg, 0.25 mg, Oral, TID, Maxwell Gallego MD  •  sodium chloride nasal spray 1 spray, 1 spray, Each Nare, Q1H PRN, Maxwell Gallego MD  •  vitamin A & D ointment, , Topical, Q12H, Maxwell Gallego MD  •  Meseret's amazing butt cream, , Topical, PRN, Celina Cedeno, APRN  •  Meseret's amazing butt cream, , Topical, TID, Celina Cedeno, APRN  •  Meseret's amazing butt cream, , Topical, BID, eClina Cedeno, APRN  •  warfarin (COUMADIN) tablet 1 mg, 1 mg, Oral, Daily, Maxwell Gallego MD    Data:     Code Status:    There are no questions and answers to display.       Family History   Problem Relation Age of Onset   • Alzheimer's disease Mother    • Diabetes Father    • Stroke Father        Social History     Socioeconomic History   • Marital status:      Spouse name: Not on file   • Number of children: Not on file   • Years of education: Not on file   • Highest  "education level: Not on file   Tobacco Use   • Smoking status: Never Smoker   • Smokeless tobacco: Never Used   Substance and Sexual Activity   • Alcohol use: No   • Drug use: No   • Sexual activity: Defer       Vitals:  Ht 177.8 cm (70\")   Wt 134 kg (295 lb 8 oz)   BMI 42.40 kg/m²   T 98.6 P 61 R 16 BP 84/40 Sp02 98% (bipap)  I/O (24Hr):  No intake or output data in the 24 hours ending 01/08/20 1212    Labs and imaging:      Lab Results (last 24 hours)     Procedure Component Value Units Date/Time    Basic Metabolic Panel [[id number]]  (Abnormal) Collected:  01/08/20 0530    Specimen:  Blood Updated:  01/08/20 0556     Glucose 95 mg/dL      BUN 16 mg/dL      Creatinine 4.76 mg/dL      Sodium 135 mmol/L      Potassium 4.1 mmol/L      Chloride 96 mmol/L      CO2 26.0 mmol/L      Calcium 10.2 mg/dL      eGFR   Amer --     Comment: <15 Indicative of kidney failure.        eGFR Non African Amer 12 mL/min/1.73      Comment: <15 Indicative of kidney failure.        BUN/Creatinine Ratio 3.4     Anion Gap 13.0 mmol/L     Narrative:       GFR Normal >60  Chronic Kidney Disease <60  Kidney Failure <15      aPTT [[id number]]  (Abnormal) Collected:  01/08/20 0530    Specimen:  Blood Updated:  01/08/20 0551     PTT 60.3 seconds     Protime-INR [[id number]]  (Abnormal) Collected:  01/08/20 0530    Specimen:  Blood Updated:  01/08/20 0551     Protime 39.1 Seconds      INR 3.82    CBC & Differential [[id number]] Collected:  01/08/20 0530    Specimen:  Blood Updated:  01/08/20 0551    Narrative:       The following orders were created for panel order CBC & Differential.  Procedure                               Abnormality         Status                     ---------                               -----------         ------                     CBC Auto Differential[[id number]]        Abnormal            Final result                 Please view results for these tests on the individual orders.    CBC Auto Differential [[id number]]  " (Abnormal) Collected:  01/08/20 0530    Specimen:  Blood Updated:  01/08/20 0551     WBC 7.20 10*3/mm3      RBC 2.71 10*6/mm3      Hemoglobin 8.3 g/dL      Hematocrit 25.0 %      MCV 92.3 fL      MCH 30.6 pg      MCHC 33.2 g/dL      RDW 21.7 %      RDW-SD 72.4 fl      MPV 11.3 fL      Platelets 62 10*3/mm3      Neutrophil % 72.5 %      Lymphocyte % 7.4 %      Monocyte % 15.1 %      Eosinophil % 3.1 %      Basophil % 1.3 %      Neutrophils, Absolute 5.23 10*3/mm3      Lymphocytes, Absolute 0.53 10*3/mm3      Monocytes, Absolute 1.09 10*3/mm3      Eosinophils, Absolute 0.22 10*3/mm3      Basophils, Absolute 0.09 10*3/mm3             Xr Chest 1 View    Result Date: 12/19/2019  Narrative: HISTORY: Verify PICC placement  CXR: A frontal view the chest obtained.  COMPARISON: 12/12/2019  FINDINGS: Left upper extremity PICC line is in place with the tip projecting over the SVC. There is a tunneled right internal jugular permacatheter. There is left subclavian cardiac pacer device. The prosthetic coronary valve with median sternotomy wires.  Cardiac silhouette is enlarged. Probable patchy left basilar atelectasis with a questionable trace left pleural effusion. No pneumothorax.      Impression: 1. Left approximately PICC line tip projecting in the SVC. Additional lines described above. 2. Cardiomegaly with probable patchy left basilar atelectasis. This report was finalized on 12/19/2019 14:54 by Dr. Ivanna Eduardo MD.    Xr Chest 1 View    Result Date: 12/12/2019  Narrative: Exam:   XR CHEST 1 VW-   Date:  12/12/2019  History:  Male, age  64 years;confirmation of all lines (ETT, tracheostomy tube, central venous catheters) on arrival  COMPARISON:  Chest x-ray dated 12/05/2018.  Findings : There is a new central venous catheter, IJ approach, terminating over the mid to low SVC region. The left-sided cardiac device with leads project over the right atrium and right ventricle. Prior aortic valve replacement. Median sternotomy  wires appear intact. The heart and mediastinum are unchanged in size. Obscuration of the left lung base, concerning for consolidative process.  The bones show no acute pathology.       Impression: Impression:  1.  Lines as described above. 2.  Obscuration of the left lung base, concerning for a left lower lobe consolidative process.  This report was finalized on 12/12/2019 20:39 by Dr. Sana Terrazas MD.      Physical Examination:        Physical Exam   Constitutional: He is oriented to person, place, and time. Vital signs are normal. He appears well-developed and well-nourished. He is cooperative.   HENT:   Head: Normocephalic and atraumatic.   Nose: Nose normal.   Mouth/Throat: Oropharynx is clear and moist.   Eyes: Pupils are equal, round, and reactive to light. Conjunctivae, EOM and lids are normal.   Neck: Trachea normal and normal range of motion. Neck supple.   Cardiovascular: Normal rate, regular rhythm and normal heart sounds.   Pulmonary/Chest: Effort normal and breath sounds normal.   Abdominal: Soft. Normal appearance and bowel sounds are normal.   Obese   Musculoskeletal: Normal range of motion. He exhibits edema (diffuse).   Generalized weakness   Neurological: He is alert and oriented to person, place, and time. He has normal strength. No cranial nerve deficit or sensory deficit.   Skin: Skin is warm, dry and intact. No petechiae and no rash noted. No cyanosis or erythema. Nails show no clubbing.   Scattered ecchymosis bilateral upper extremities  Jaundice  BLE discolored   Psychiatric: He has a normal mood and affect. His speech is normal and behavior is normal. Judgment and thought content normal. Cognition and memory are normal.   Nursing note and vitals reviewed.        Assessment:             * No active hospital problems. *    Past Medical History:   Diagnosis Date   • Atelectasis, left 12/9/2018   • Cerebrovascular disease, acute    • Chest pain    • Chronic atrial fibrillation (CMS/HCC)       ablation X 3; followed by Dr. Perez chronically anticoagulated with Coumadin   • Class 3 severe obesity with serious comorbidity and body mass index (BMI) of 40.0 to 44.9 in adult (CMS/HCC) 12/9/2018   • COPD (chronic obstructive pulmonary disease) (CMS/HCC)    • Diaphragm dysfunction 12/9/2018   • Glaucoma    • Hypertrophic cardiomyopathy (CMS/HCC)    • Intermittent palpitations    • Lung nodule 6/24/2019   • Mitral valve replaced    • Mixed hyperlipidemia    • Moderate persistent asthma without complication 12/9/2018   • Obstructive sleep apnea 12/9/2018   • Obstructive sleep apnea, adult    • Pacemaker    • Rash 6/24/2019    Arms and legs   • Restrictive lung disease 12/9/2018   • Stroke (CMS/HCC)     x2        Plan:        1. Acute on chronic diastolic CHF  2. Acute kidney injury on CKD4  3. Multifactorial anemia  4. Chronic anticoagulation  5. MINH  6. COPD  7. Hx CVA  8. Hypertrophic cardiomyopathy  9. Hx MVR  10. Atrial fibrillation  11. PATTERSON with cirrhosis  12. Hyperthyroidism  13. Thrombocytopenia, most likely secondary to liver disease and consumption due to acute illness  14. Supratherapeutic INR     Continue current treatment. Monitor counts. Increase activity as tolerated. Aggressive therapies as tolerated. Labs with HD Monday. Continue blood pressure support as needed with levophed. Appreciate cardiology input. Hold coumadin for inr > 3.5.  HD per nephrology. Monitor platelets closely. Encourage compliance with fluid restriction.       Electronically signed by GLORIA Huggins on 1/8/2020 at 12:12 PM   I have discussed the care of Pranay Gutierrez, including pertinent history and exam findings, with the nurse practitioner.    I have seen and examined the patient and the key elements of all parts of the encounter have been performed by me.  I agree with the assessment, plan and orders as documented by GLORIA Shafer, after I modified the exam findings and the plan of treatments and the final  version is my approved version of the assessment.        Electronically signed by Maxwell Gallego MD on 1/8/2020 at 6:00 PM

## 2020-01-08 NOTE — PROGRESS NOTES
Nephrology ([facility]) Progress Note      Patient:  Pranay Gutierrez  YOB: 1955  Date of Service: 1/8/2020  MRN: [id number]   Acct: [id number]   Primary Care Physician: Dwaine Wiley PA-C  Advance Directive:   There are no questions and answers to display.     Admit Date: 12/12/2019       Hospital Day: 0  Referring Provider: Maxwell Gallego MD      Patient personally seen and examined.  Complete chart including Consults, Notes, Operative Reports, Labs, Cardiology, and Radiology studies reviewed as able.    Chief complaint: Acute kidney injury/dialysis dependent.    Subjective:  Pranay Gutierrez is a 64 y.o. male  whom we were consulted for SARAH on HD.  First HD 10/27.    Patient was admitted initially at [facility].  He was then transferred to [facility] where he had CRRT.  After stabilization he was transferred to [facility] here at [place].  Due to complexity of his medical problem including cirrhosis of liver, diastolic CHF and acute kidney injury he remained on low-dose Levophed.   Patient had no sign of renal recovery and remains on dialysis.    Currently seen on hemodialysis  Hemodialysis: 3-1/2-hour  Ultrafiltration: 3000 cc  2K bath  Blood flow rate is 450 cc/min  Hemodialysis Access: Permacath.        Allergies:  Adhesive tape; Amoxicillin; Silicone; Penicillins; and Sulfamethoxazole-trimethoprim    Home Meds:  Medications Prior to Admission   Medication Sig Dispense Refill Last Dose   • ASMANEX 120 METERED DOSES 220 MCG/INH inhaler INHALE TWO PUFFS BY MOUTH TWICE A DAY 1 inhaler 11 Taking   • atorvastatin (LIPITOR) 20 MG tablet Take 20 mg by mouth Daily.   Taking   • citalopram (CeleXA) 20 MG tablet Take 20 mg by mouth Daily.   Taking   • desonide (DESOWEN) 0.05 % cream Apply 1 application topically 2 (Two) Times a Day.   Taking   • dofetilide (TIKOSYN) 250 MCG capsule Take 1 capsule by mouth Every 12  (Twelve) Hours. (Patient taking differently: Take 500 mcg by mouth Every 12 (Twelve) Hours.) 60 capsule 11 Taking   • furosemide (LASIX) 80 MG tablet Take 80 mg by mouth Daily.   Taking   • ipratropium (ATROVENT) 0.02 % nebulizer solution Take 500 mcg by nebulization 4 (Four) Times a Day.   Taking   • latanoprost (XALATAN) 0.005 % ophthalmic solution 1 drop Every Night.   Taking   • levalbuterol (XOPENEX HFA) 45 MCG/ACT inhaler Inhale 1-2 puffs Every 4 (Four) Hours As Needed for Wheezing.   Taking   • levalbuterol (XOPENEX) 1.25 MG/3ML nebulizer solution Take 3 mL by nebulization 3 (Three) Times a Day As Needed for Wheezing.   Taking   • magnesium oxide (MAGOX) 400 (241.3 Mg) MG tablet tablet Take 400 mg by mouth 2 (Two) Times a Day.   Taking   • Melatonin 10 MG tablet Take 10 mg by mouth Every Night.   Taking   • metOLazone (ZAROXOLYN) 2.5 MG tablet Take 2.5 mg by mouth Daily. 1 TABLET PO EVERY Monday, Wednesday & Friday   Taking   • metoprolol succinate XL (TOPROL-XL) 50 MG 24 hr tablet Take 75 mg by mouth 2 (Two) Times a Day.   Taking   • Metoprolol Tartrate 75 MG tablet Take 75 mg by mouth 2 (Two) Times a Day.   Taking   • Mometasone Furoate (ASMANEX HFA) 200 MCG/ACT aerosol Inhale 2 puffs 2 (Two) Times a Day.   Taking   • potassium chloride (K-DUR) 10 MEQ CR tablet Take 10 mEq by mouth 2 (Two) Times a Day.   Taking   • potassium chloride (K-DUR,KLOR-CON) 20 MEQ CR tablet Take 20 mEq by mouth 2 (Two) Times a Day. 30 mg am & 30 mg pm   Taking   • propylthiouracil (PTU) 50 MG tablet Take 50 mg by mouth Daily.   Taking   • spironolactone (ALDACTONE) 25 MG tablet Take 25 mg by mouth Daily.   Taking   • warfarin (COUMADIN) 2 MG tablet Take 2.5 mg by mouth Daily.   Taking       Medicines:  Current Facility-Administered Medications   Medication Dose Route Frequency Provider Last Rate Last Dose   • acetaminophen (TYLENOL) tablet 500 mg  500 mg Oral Q6H PRN Maxwell Gallego MD       • albumin human 25 % IV SOLN  12.5 g  12.5 g Intravenous Q5 Min PRN Renny Floyd MD       • b complex-vitamin c-folic acid (NEPHRO-JENA) tablet 1 tablet  1 tablet Oral Daily Maxwell Gallego MD       • bumetanide (BUMEX) 10 mg in sodium chloride 0.9 % 100 mL (0.1 mg/mL) infusion  0.5 mg/hr Intravenous Continuous Adriel Viera MD       • dextrose (D50W) 25 g/ 50mL Intravenous Solution 25 g  25 g Intravenous Q15 Min PRN Maxwell Gallego MD       • dextrose (GLUTOSE) oral gel 15 g  15 g Oral Q15 Min PRN Maxwell Gallego MD       • epoetin haylee (EPOGEN,PROCRIT) injection 10,000 Units  10,000 Units Subcutaneous Weekly Sravan Adam MD       • flecainide (TAMBOCOR) tablet 25 mg  25 mg Oral Q12H Maxwell Gallego MD       • folic acid (FOLVITE) tablet 1 mg  1 mg Oral Daily Maxwell Gallego MD       • glucagon (human recombinant) (GLUCAGEN DIAGNOSTIC) injection 1 mg  1 mg Subcutaneous Q15 Min PRN Maxwell Gallego MD       • heparin (porcine) injection 2,000 Units  2,000 Units Intracatheter PRN Renny Floyd MD   2,000 Units at 12/18/19 1220   • heparin (porcine) injection 2,000 Units  2,000 Units Intracatheter PRN Renny Floyd MD   2,000 Units at 12/18/19 1221   • heparin (porcine) injection 4,100 Units  4,100 Units Intravenous Once Adriel Viera MD       • heparin (porcine) injection 4,100 Units  4,100 Units Intravenous Once Adriel Viera MD       • hypromellose (ISOPTO TEARS) 0.5 % ophthalmic solution 1 drop  1 drop Both Eyes Q4H PRN Maxwell Gallego MD       • lactulose solution 20 g  20 g Oral Daily Maxwell Gallego MD       • latanoprost (XALATAN) 0.005 % ophthalmic solution 1 drop  1 drop Left Eye Nightly Maxwell Gallego MD       • melatonin tablet 6 mg  6 mg Oral Nightly Maxwell Gallego MD       • midodrine (PROAMATINE) tablet 10 mg  10 mg Oral Q8H Sravan Adam MD       • norepinephrine (LEVOPHED) 8,000 mcg in  sodium chloride 0.9 % 250 mL (32 mcg/mL) infusion  0.02-0.3 mcg/kg/min Intravenous Titrated Maxwell Gallego MD       • ondansetron (ZOFRAN) tablet 4 mg  4 mg Oral Q6H PRN Maxwell Gallego MD        Or   • ondansetron (ZOFRAN) injection 4 mg  4 mg Intravenous Q6H PRN Maxwell Gallego MD       • pantoprazole (PROTONIX) EC tablet 40 mg  40 mg Oral Q AM Maxwell Gallego MD       • riFAXIMin (XIFAXAN) tablet 550 mg  550 mg Oral Q12H Maxwell Gallego MD       • rOPINIRole (REQUIP) tablet 0.25 mg  0.25 mg Oral TID Maxwell Gallego MD       • sodium chloride nasal spray 1 spray  1 spray Each Nare Q1H PRN Maxwell Gallego MD       • vitamin A & D ointment   Topical Q12H Maxwell Gallego MD       • Meseret's amazing butt cream   Topical PRN Celina Cedeno, APRN       • Meseret's amazing butt cream   Topical TID Celian Cedeno, APRN       • Meseret's amazing butt cream   Topical BID Celina Cedeno, APRN       • warfarin (COUMADIN) tablet 1 mg  1 mg Oral Daily Maxwell Gallego MD           Past Medical History:  Past Medical History:   Diagnosis Date   • Atelectasis, left 12/9/2018   • Cerebrovascular disease, acute    • Chest pain    • Chronic atrial fibrillation (CMS/HCC)      ablation X 3; followed by Dr. Perez chronically anticoagulated with Coumadin   • Class 3 severe obesity with serious comorbidity and body mass index (BMI) of 40.0 to 44.9 in adult (CMS/HCC) 12/9/2018   • COPD (chronic obstructive pulmonary disease) (CMS/HCC)    • Diaphragm dysfunction 12/9/2018   • Glaucoma    • Hypertrophic cardiomyopathy (CMS/HCC)    • Intermittent palpitations    • Lung nodule 6/24/2019   • Mitral valve replaced    • Mixed hyperlipidemia    • Moderate persistent asthma without complication 12/9/2018   • Obstructive sleep apnea 12/9/2018   • Obstructive sleep apnea, adult    • Pacemaker    • Rash 6/24/2019    Arms and legs   • Restrictive lung disease 12/9/2018   • Stroke  (CMS/[facility])     x2       Past Surgical History:  Past Surgical History:   Procedure Laterality Date   • APPENDECTOMY     • CARDIAC ABLATION  01/03/2012   • CORONARY ARTERY BYPASS GRAFT     • HEMORROIDECTOMY     • MITRAL VALVE REPLACEMENT     • PACEMAKER IMPLANTATION      Cardiac Pacemaker Insertion   • VASECTOMY         Family History  Family History   Problem Relation Age of Onset   • Alzheimer's disease Mother    • Diabetes Father    • Stroke Father        Social History  Social History     Socioeconomic History   • Marital status:      Spouse name: Not on file   • Number of children: Not on file   • Years of education: Not on file   • Highest education level: Not on file   Tobacco Use   • Smoking status: Never Smoker   • Smokeless tobacco: Never Used   Substance and Sexual Activity   • Alcohol use: No   • Drug use: No   • Sexual activity: Defer         Review of Systems:  History obtained from chart review and the patient  General ROS: No fever or chills  Respiratory ROS: No cough, shortness of breath, wheezing  Cardiovascular ROS: No chest pain or palpitations  Gastrointestinal ROS: No abdominal pain or melena  Genito-Urinary ROS: No dysuria or hematuria    Objective:  Blood pressure: 87/50 mmHg  Heart rate is 67 bpm  O2 saturation 98%.    General: awake/alert   HEENT: Normocephalic atraumatic head  Chest:  clear to auscultation bilaterally without respiratory distress  CVS: regular rate and rhythm  Abdominal: soft, nontender, positive bowel sounds  Extremities: ble edema  Skin: warm/dry with ble stasis changes      Labs:  Results from last 7 days   Lab Units 01/08/20  0530 01/06/20  0546   WBC 10*3/mm3 7.20 8.70   HEMOGLOBIN g/dL 8.3* 8.7*   HEMATOCRIT % 25.0* 25.9*   PLATELETS 10*3/mm3 62* 76*         Results from last 7 days   Lab Units 01/08/20  0530 01/06/20  0546 01/05/20  0423   SODIUM mmol/L 135* 137 137   POTASSIUM mmol/L 4.1 4.2 4.0   CHLORIDE mmol/L 96* 97* 97*   CO2 mmol/L 26.0 26.0 25.0   BUN  mg/dL 16 17 12   CREATININE mg/dL 4.76* 4.92* 3.77*   CALCIUM mg/dL 10.2 10.3 10.2   GLUCOSE mg/dL 95 105* 117*       Radiology:   Imaging Results (Last 72 Hours)     ** No results found for the last 72 hours. **          Culture:  No results found for: BLOODCX, URINECX, WOUNDCX, MRSACX, RESPCX, STOOLCX      Assessment   1.  Acute kidney injury/dialysis dependent/currently seen on hemodialysis.  2.  Stage III chronic kidney disease baseline.  3.  Hepatorenal syndrome.  4.  Acute on chronic sy diastolic CHF.  5.  Chronic thrombocytopenia.  6.  Anasarca/volume overload.  7.  Chronic hypotension/on Levophed/midodrine.    Plan:  1.  Tolerating hemodialysis very well.  2.  Try to wean off Levophed.  3.  Continue midodrine as needed as well.      Sravan Adam MD  1/8/2020  8:50 AM

## 2020-01-09 LAB
APTT PPP: 56.7 SECONDS (ref 24.1–35)
INR PPP: 3.72 (ref 0.91–1.09)
PROTHROMBIN TIME: 38.3 SECONDS (ref 11.9–14.6)

## 2020-01-09 PROCEDURE — 97530 THERAPEUTIC ACTIVITIES: CPT

## 2020-01-09 PROCEDURE — 97535 SELF CARE MNGMENT TRAINING: CPT

## 2020-01-09 PROCEDURE — 92507 TX SP LANG VOICE COMM INDIV: CPT

## 2020-01-09 PROCEDURE — 85730 THROMBOPLASTIN TIME PARTIAL: CPT | Performed by: INTERNAL MEDICINE

## 2020-01-09 PROCEDURE — 25010000002 ONDANSETRON PER 1 MG: Performed by: INTERNAL MEDICINE

## 2020-01-09 PROCEDURE — 85610 PROTHROMBIN TIME: CPT | Performed by: INTERNAL MEDICINE

## 2020-01-09 RX ORDER — MIDODRINE HYDROCHLORIDE 10 MG/1
20 TABLET ORAL EVERY 8 HOURS SCHEDULED
Status: DISCONTINUED | OUTPATIENT
Start: 2020-01-09 | End: 2020-01-13 | Stop reason: ALTCHOICE

## 2020-01-09 NOTE — PROGRESS NOTES
[facility]  RA Peterson M.D.  GLORIA Shafer APRN      Internal Medicine Progress Note    1/9/2020   1:02 PM    Name:  Pranay Gutierrez  MRN:    [id number]     Acct:     [id number]   Room:  [address] [place] [id number] Day: 0     Admit Date: 12/12/2019  3:06 PM  PCP: Dwaine Wiley PA-C    Subjective:     C/C: Continued HD and rehabilitation    Interval History: Status: Improved. Resting in bed. No family at bedside. Woke from sleep. Not wearing bipap. Off levophed.  Remains noncompliant/manipulative with fluid and dietary restrictions. INR supratherapeutic.  Slow progress with therapy.      Review of Systems   Constitution: Positive for malaise/fatigue. Negative for chills, decreased appetite and fever.   HENT: Negative for congestion, hoarse voice, nosebleeds and sore throat.    Eyes: Negative for blurred vision, discharge, pain and visual disturbance.   Cardiovascular: Negative for chest pain, dyspnea on exertion, irregular heartbeat, leg swelling, orthopnea and palpitations.   Respiratory: Negative for cough, shortness of breath, sputum production and wheezing.    Hematologic/Lymphatic: Negative for bleeding problem. Does not bruise/bleed easily.   Skin: Negative for dry skin, flushing, itching, poor wound healing, rash and suspicious lesions.        Significant ecchymosis   Musculoskeletal: Negative for arthritis, back pain, falls, joint pain, joint swelling, muscle weakness and myalgias.   Gastrointestinal: Negative for bloating, abdominal pain, change in bowel habit, diarrhea, flatus, heartburn, melena and nausea.   Genitourinary: Negative for frequency, hesitancy, incomplete emptying, pelvic pain and urgency.   Neurological: Negative for difficulty with concentration, disturbances in coordination, dizziness, headaches, numbness, paresthesias, tremors and weakness.   Psychiatric/Behavioral: Negative for altered mental status, hallucinations and memory loss. The  patient is not nervous/anxious.      Medications:     Allergies:   Allergies   Allergen Reactions   • Adhesive Tape Other (See Comments)   • Amoxicillin Other (See Comments)     unknkown   • Silicone Other (See Comments)     unknown   • Penicillins Hives   • Sulfamethoxazole-Trimethoprim Nausea Only       Current Meds:   Current Facility-Administered Medications:   •  acetaminophen (TYLENOL) tablet 500 mg, 500 mg, Oral, Q6H PRN, Maxwell Gallego MD  •  albumin human 25 % IV SOLN 12.5 g, 12.5 g, Intravenous, Q5 Min PRN, Renny Floyd MD  •  b complex-vitamin c-folic acid (NEPHRO-JENA) tablet 1 tablet, 1 tablet, Oral, Daily, Maxwell Gallego MD  •  bacitracin ointment, , Topical, Q12H, Maxwell Gallego MD  •  bumetanide (BUMEX) 10 mg in sodium chloride 0.9 % 100 mL (0.1 mg/mL) infusion, 0.5 mg/hr, Intravenous, Continuous, Adriel Viera MD  •  dextrose (D50W) 25 g/ 50mL Intravenous Solution 25 g, 25 g, Intravenous, Q15 Min PRN, Maxwell Gallego MD  •  dextrose (GLUTOSE) oral gel 15 g, 15 g, Oral, Q15 Min PRN, Maxwell Gallego MD  •  epoetin haylee (EPOGEN,PROCRIT) injection 10,000 Units, 10,000 Units, Subcutaneous, Weekly, Sravan Adam MD  •  flecainide (TAMBOCOR) tablet 25 mg, 25 mg, Oral, Q12H, Maxwell Gallego MD  •  folic acid (FOLVITE) tablet 1 mg, 1 mg, Oral, Daily, Maxwell Gallego MD  •  glucagon (human recombinant) (GLUCAGEN DIAGNOSTIC) injection 1 mg, 1 mg, Subcutaneous, Q15 Min PRN, Maxwell Gallego MD  •  heparin (porcine) injection 2,000 Units, 2,000 Units, Intracatheter, PRN, Renny Floyd MD, 2,000 Units at 12/18/19 1220  •  heparin (porcine) injection 2,000 Units, 2,000 Units, Intracatheter, PRN, Renny Floyd MD, 2,000 Units at 12/18/19 1221  •  heparin (porcine) injection 4,100 Units, 4,100 Units, Intravenous, Once, Adriel Viera MD  •  heparin (porcine) injection 4,100 Units, 4,100 Units,  Intravenous, Once, Adriel Viera MD  •  hypromellose (ISOPTO TEARS) 0.5 % ophthalmic solution 1 drop, 1 drop, Both Eyes, Q4H PRN, Maxwell Gallego MD  •  lactulose solution 20 g, 20 g, Oral, Daily, Maxwell Gallego MD  •  latanoprost (XALATAN) 0.005 % ophthalmic solution 1 drop, 1 drop, Left Eye, Nightly, Maxwell Gallego MD  •  melatonin tablet 6 mg, 6 mg, Oral, Nightly, Maxwell Gallego MD  •  midodrine (PROAMATINE) tablet 10 mg, 10 mg, Oral, Q8H, Sravan Adam MD  •  ondansetron (ZOFRAN) tablet 4 mg, 4 mg, Oral, Q6H PRN **OR** ondansetron (ZOFRAN) injection 4 mg, 4 mg, Intravenous, Q6H PRN, Maxwell Gallego MD  •  pantoprazole (PROTONIX) EC tablet 40 mg, 40 mg, Oral, Q AM, Maxwell Gallego MD  •  riFAXIMin (XIFAXAN) tablet 550 mg, 550 mg, Oral, Q12H, Maxwell Gallego MD  •  rOPINIRole (REQUIP) tablet 0.25 mg, 0.25 mg, Oral, TID, Maxwell Gallego MD  •  sodium chloride nasal spray 1 spray, 1 spray, Each Nare, Q1H PRN, Maxwell Gallego MD  •  vitamin A & D ointment, , Topical, Q12H, Maxwell Gallego MD  •  Meseret's amazing butt cream, , Topical, PRN, Celina Cedeno, APRN  •  Meseret's amazing butt cream, , Topical, TID, Celina Cedeno, APRN  •  Meseret's amazing butt cream, , Topical, BID, Celina Cedeno, APRN  •  warfarin (COUMADIN) tablet 1 mg, 1 mg, Oral, Daily, Maxwell Gallego MD    Data:     Code Status:    There are no questions and answers to display.       Family History   Problem Relation Age of Onset   • Alzheimer's disease Mother    • Diabetes Father    • Stroke Father        Social History     Socioeconomic History   • Marital status:      Spouse name: Not on file   • Number of children: Not on file   • Years of education: Not on file   • Highest education level: Not on file   Tobacco Use   • Smoking status: Never Smoker   • Smokeless tobacco: Never Used   Substance and Sexual Activity   • Alcohol use: No   •  "Drug use: No   • Sexual activity: Defer       Vitals:  Ht 177.8 cm (70\")   Wt 134 kg (295 lb 8 oz)   BMI 42.40 kg/m²   T 97.9 P 62 R 25 BP 63/36 Sp02 96% (room air)  I/O (24Hr):  No intake or output data in the 24 hours ending 01/09/20 1302    Labs and imaging:      Lab Results (last 24 hours)     Procedure Component Value Units Date/Time    aPTT [[id number]]  (Abnormal) Collected:  01/09/20 0523    Specimen:  Blood Updated:  01/09/20 0634     PTT 56.7 seconds     Protime-INR [[id number]]  (Abnormal) Collected:  01/09/20 0523    Specimen:  Blood Updated:  01/09/20 0634     Protime 38.3 Seconds      INR 3.72    POC Glucose Once [[id number]]  (Normal) Collected:  01/08/20 1721    Specimen:  Blood Updated:  01/08/20 1734     Glucose 118 mg/dL      Comment: : [facility] CindyMeter ID: [id number]               Xr Chest 1 View    Result Date: 12/19/2019  Narrative: HISTORY: Verify PICC placement  CXR: A frontal view the chest obtained.  COMPARISON: 12/12/2019  FINDINGS: Left upper extremity PICC line is in place with the tip projecting over the SVC. There is a tunneled right internal jugular permacatheter. There is left subclavian cardiac pacer device. The prosthetic coronary valve with median sternotomy wires.  Cardiac silhouette is enlarged. Probable patchy left basilar atelectasis with a questionable trace left pleural effusion. No pneumothorax.      Impression: 1. Left approximately PICC line tip projecting in the SVC. Additional lines described above. 2. Cardiomegaly with probable patchy left basilar atelectasis. This report was finalized on 12/19/2019 14:54 by Dr. Ivanna Eduardo MD.    Xr Chest 1 View    Result Date: 12/12/2019  Narrative: Exam:   XR CHEST 1 VW-   Date:  12/12/2019  History:  Male, age  64 years;confirmation of all lines (ETT, tracheostomy tube, central venous catheters) on arrival  COMPARISON:  Chest x-ray dated 12/05/2018.  Findings : There is a new central venous catheter, " IJ approach, terminating over the mid to low SVC region. The left-sided cardiac device with leads project over the right atrium and right ventricle. Prior aortic valve replacement. Median sternotomy wires appear intact. The heart and mediastinum are unchanged in size. Obscuration of the left lung base, concerning for consolidative process.  The bones show no acute pathology.       Impression: Impression:  1.  Lines as described above. 2.  Obscuration of the left lung base, concerning for a left lower lobe consolidative process.  This report was finalized on 12/12/2019 20:39 by Dr. Sana Terrazas MD.      Physical Examination:        Physical Exam   Constitutional: He is oriented to person, place, and time. Vital signs are normal. He appears well-developed and well-nourished. He is cooperative.   HENT:   Head: Normocephalic and atraumatic.   Nose: Nose normal.   Mouth/Throat: Oropharynx is clear and moist.   Eyes: Pupils are equal, round, and reactive to light. Conjunctivae, EOM and lids are normal.   Neck: Trachea normal and normal range of motion. Neck supple.   Cardiovascular: Normal rate, regular rhythm and normal heart sounds.   Pulmonary/Chest: Effort normal and breath sounds normal.   Abdominal: Soft. Normal appearance and bowel sounds are normal.   Obese   Musculoskeletal: Normal range of motion. He exhibits edema (diffuse).   Generalized weakness   Neurological: He is alert and oriented to person, place, and time. He has normal strength. No cranial nerve deficit or sensory deficit.   Skin: Skin is warm, dry and intact. No petechiae and no rash noted. No cyanosis or erythema. Nails show no clubbing.   Scattered ecchymosis bilateral upper extremities  Jaundice  BLE discolored   Psychiatric: He has a normal mood and affect. His speech is normal and behavior is normal. Judgment and thought content normal. Cognition and memory are normal.   Nursing note and vitals reviewed.        Assessment:             * No  active hospital problems. *    Past Medical History:   Diagnosis Date   • Atelectasis, left 12/9/2018   • Cerebrovascular disease, acute    • Chest pain    • Chronic atrial fibrillation (CMS/HCC)      ablation X 3; followed by Dr. Perez chronically anticoagulated with Coumadin   • Class 3 severe obesity with serious comorbidity and body mass index (BMI) of 40.0 to 44.9 in adult (CMS/HCC) 12/9/2018   • COPD (chronic obstructive pulmonary disease) (CMS/HCC)    • Diaphragm dysfunction 12/9/2018   • Glaucoma    • Hypertrophic cardiomyopathy (CMS/HCC)    • Intermittent palpitations    • Lung nodule 6/24/2019   • Mitral valve replaced    • Mixed hyperlipidemia    • Moderate persistent asthma without complication 12/9/2018   • Obstructive sleep apnea 12/9/2018   • Obstructive sleep apnea, adult    • Pacemaker    • Rash 6/24/2019    Arms and legs   • Restrictive lung disease 12/9/2018   • Stroke (CMS/HCC)     x2        Plan:        1. Acute on chronic diastolic CHF  2. Acute kidney injury on CKD4  3. Multifactorial anemia  4. Chronic anticoagulation  5. MINH  6. COPD  7. Hx CVA  8. Hypertrophic cardiomyopathy  9. Hx MVR  10. Atrial fibrillation  11. PATTERSON with cirrhosis  12. Hyperthyroidism  13. Thrombocytopenia, most likely secondary to liver disease and consumption due to acute illness  14. Supratherapeutic INR     Continue current treatment. Monitor counts. Increase activity as tolerated. Aggressive therapies as tolerated. Labs with HD Monday. Continue blood pressure support as needed with levophed. Appreciate cardiology input. Hold coumadin for inr > 3.5.  HD per nephrology. Monitor platelets closely. Encourage compliance with fluid restriction.       Electronically signed by GLORIA Huggins on 1/9/2020 at 1:02 PM   I have discussed the care of Pranay Gutierrez, including pertinent history and exam findings, with the nurse practitioner.    I have seen and examined the patient and the key elements of all parts of  the encounter have been performed by me.  I agree with the assessment, plan and orders as documented by GLORIA Shafer, after I modified the exam findings and the plan of treatments and the final version is my approved version of the assessment.        Electronically signed by Maxwell Gallego MD on 1/9/2020 at 8:38 PM

## 2020-01-09 NOTE — PROGRESS NOTES
Nephrology ([facility]) Progress Note      Patient:  Pranay Gutierrez  YOB: 1955  Date of Service: 1/9/2020  MRN: [id number]   Acct: [id number]   Primary Care Physician: Dwaine Wiley PA-C  Advance Directive:   There are no questions and answers to display.     Admit Date: 12/12/2019       Hospital Day: 0  Referring Provider: Maxwell Gallego MD      Patient personally seen and examined.  Complete chart including Consults, Notes, Operative Reports, Labs, Cardiology, and Radiology studies reviewed as able.    Chief complaint: Acute kidney injury/dialysis dependent.    Subjective:  Pranay Gutierrez is a 64 y.o. male  whom we were consulted for SARAH on HD.  First HD 10/27.    Patient was admitted initially at [facility].  He was then transferred to [facility] where he had CRRT.  After stabilization he was transferred to long-term acute [facility] here at [place].  Due to complexity of his medical problem including cirrhosis of liver, diastolic CHF and acute kidney injury.  Finally he has been off the Levophed and blood pressure is borderline low.    This afternoon he feels fatigue and has low energy.        Allergies:  Adhesive tape; Amoxicillin; Silicone; Penicillins; and Sulfamethoxazole-trimethoprim    Home Meds:  Medications Prior to Admission   Medication Sig Dispense Refill Last Dose   • ASMANEX 120 METERED DOSES 220 MCG/INH inhaler INHALE TWO PUFFS BY MOUTH TWICE A DAY 1 inhaler 11 Taking   • atorvastatin (LIPITOR) 20 MG tablet Take 20 mg by mouth Daily.   Taking   • citalopram (CeleXA) 20 MG tablet Take 20 mg by mouth Daily.   Taking   • desonide (DESOWEN) 0.05 % cream Apply 1 application topically 2 (Two) Times a Day.   Taking   • dofetilide (TIKOSYN) 250 MCG capsule Take 1 capsule by mouth Every 12 (Twelve) Hours. (Patient taking differently: Take 500 mcg by mouth Every 12 (Twelve) Hours.) 60 capsule 11 Taking   • furosemide  (LASIX) 80 MG tablet Take 80 mg by mouth Daily.   Taking   • ipratropium (ATROVENT) 0.02 % nebulizer solution Take 500 mcg by nebulization 4 (Four) Times a Day.   Taking   • latanoprost (XALATAN) 0.005 % ophthalmic solution 1 drop Every Night.   Taking   • levalbuterol (XOPENEX HFA) 45 MCG/ACT inhaler Inhale 1-2 puffs Every 4 (Four) Hours As Needed for Wheezing.   Taking   • levalbuterol (XOPENEX) 1.25 MG/3ML nebulizer solution Take 3 mL by nebulization 3 (Three) Times a Day As Needed for Wheezing.   Taking   • magnesium oxide (MAGOX) 400 (241.3 Mg) MG tablet tablet Take 400 mg by mouth 2 (Two) Times a Day.   Taking   • Melatonin 10 MG tablet Take 10 mg by mouth Every Night.   Taking   • metOLazone (ZAROXOLYN) 2.5 MG tablet Take 2.5 mg by mouth Daily. 1 TABLET PO EVERY Monday, Wednesday & Friday   Taking   • metoprolol succinate XL (TOPROL-XL) 50 MG 24 hr tablet Take 75 mg by mouth 2 (Two) Times a Day.   Taking   • Metoprolol Tartrate 75 MG tablet Take 75 mg by mouth 2 (Two) Times a Day.   Taking   • Mometasone Furoate (ASMANEX HFA) 200 MCG/ACT aerosol Inhale 2 puffs 2 (Two) Times a Day.   Taking   • potassium chloride (K-DUR) 10 MEQ CR tablet Take 10 mEq by mouth 2 (Two) Times a Day.   Taking   • potassium chloride (K-DUR,KLOR-CON) 20 MEQ CR tablet Take 20 mEq by mouth 2 (Two) Times a Day. 30 mg am & 30 mg pm   Taking   • propylthiouracil (PTU) 50 MG tablet Take 50 mg by mouth Daily.   Taking   • spironolactone (ALDACTONE) 25 MG tablet Take 25 mg by mouth Daily.   Taking   • warfarin (COUMADIN) 2 MG tablet Take 2.5 mg by mouth Daily.   Taking       Medicines:  Current Facility-Administered Medications   Medication Dose Route Frequency Provider Last Rate Last Dose   • acetaminophen (TYLENOL) tablet 500 mg  500 mg Oral Q6H PRN Maxwell Gallego MD       • albumin human 25 % IV SOLN 12.5 g  12.5 g Intravenous Q5 Min PRN Renny Floyd MD       • b complex-vitamin c-folic acid (NEPHRO-JENA) tablet 1  tablet  1 tablet Oral Daily Maxwell Gallego MD       • bacitracin ointment   Topical Q12H Maxwell Gallego MD       • bumetanide (BUMEX) 10 mg in sodium chloride 0.9 % 100 mL (0.1 mg/mL) infusion  0.5 mg/hr Intravenous Continuous Adriel Viera MD       • dextrose (D50W) 25 g/ 50mL Intravenous Solution 25 g  25 g Intravenous Q15 Min PRN Maxwell Gallego MD       • dextrose (GLUTOSE) oral gel 15 g  15 g Oral Q15 Min PRN Maxwell Gallego MD       • epoetin haylee (EPOGEN,PROCRIT) injection 10,000 Units  10,000 Units Subcutaneous Weekly Sravan Adam MD       • flecainide (TAMBOCOR) tablet 25 mg  25 mg Oral Q12H Maxwell Gallego MD       • folic acid (FOLVITE) tablet 1 mg  1 mg Oral Daily Maxwell Gallego MD       • glucagon (human recombinant) (GLUCAGEN DIAGNOSTIC) injection 1 mg  1 mg Subcutaneous Q15 Min PRN Maxwell Gallego MD       • heparin (porcine) injection 2,000 Units  2,000 Units Intracatheter PRN Renny Floyd MD   2,000 Units at 12/18/19 1220   • heparin (porcine) injection 2,000 Units  2,000 Units Intracatheter PRN Renny Floyd MD   2,000 Units at 12/18/19 1221   • heparin (porcine) injection 4,100 Units  4,100 Units Intravenous Once Adriel Viera MD       • heparin (porcine) injection 4,100 Units  4,100 Units Intravenous Once Adriel Viera MD       • hypromellose (ISOPTO TEARS) 0.5 % ophthalmic solution 1 drop  1 drop Both Eyes Q4H PRN Maxwell Gallego MD       • lactulose solution 20 g  20 g Oral Daily Maxwell Gallego MD       • latanoprost (XALATAN) 0.005 % ophthalmic solution 1 drop  1 drop Left Eye Nightly Maxwell Gallego MD       • melatonin tablet 6 mg  6 mg Oral Nightly Maxwell Gallego MD       • midodrine (PROAMATINE) tablet 10 mg  10 mg Oral Q8H Sravan Adam MD       • ondansetron (ZOFRAN) tablet 4 mg  4 mg Oral Q6H PRN Maxwell Gallego MD        Or   •  ondansetron (ZOFRAN) injection 4 mg  4 mg Intravenous Q6H PRN Maxwell Gallego MD       • pantoprazole (PROTONIX) EC tablet 40 mg  40 mg Oral Q AM Maxwell Gallego MD       • riFAXIMin (XIFAXAN) tablet 550 mg  550 mg Oral Q12H Maxwell Gallego MD       • rOPINIRole (REQUIP) tablet 0.25 mg  0.25 mg Oral TID Maxwell Gallego MD       • sodium chloride nasal spray 1 spray  1 spray Each Nare Q1H PRN Maxwell Gallego MD       • vitamin A & D ointment   Topical Q12H Maxwell Gallego MD       • Meseret's amazing butt cream   Topical PRN Celina Cedeno, APRN       • Meseret's amazing butt cream   Topical TID Celina Cedeno, APRN       • Meseret's amazing butt cream   Topical BID Celina Cedeno, APRN       • warfarin (COUMADIN) tablet 1 mg  1 mg Oral Daily Maxwell Gallego MD           Past Medical History:  Past Medical History:   Diagnosis Date   • Atelectasis, left 12/9/2018   • Cerebrovascular disease, acute    • Chest pain    • Chronic atrial fibrillation (CMS/HCC)      ablation X 3; followed by Dr. Perez chronically anticoagulated with Coumadin   • Class 3 severe obesity with serious comorbidity and body mass index (BMI) of 40.0 to 44.9 in adult (CMS/HCC) 12/9/2018   • COPD (chronic obstructive pulmonary disease) (CMS/HCC)    • Diaphragm dysfunction 12/9/2018   • Glaucoma    • Hypertrophic cardiomyopathy (CMS/HCC)    • Intermittent palpitations    • Lung nodule 6/24/2019   • Mitral valve replaced    • Mixed hyperlipidemia    • Moderate persistent asthma without complication 12/9/2018   • Obstructive sleep apnea 12/9/2018   • Obstructive sleep apnea, adult    • Pacemaker    • Rash 6/24/2019    Arms and legs   • Restrictive lung disease 12/9/2018   • Stroke (CMS/HCC)     x2       Past Surgical History:  Past Surgical History:   Procedure Laterality Date   • APPENDECTOMY     • CARDIAC ABLATION  01/03/2012   • CORONARY ARTERY BYPASS GRAFT     • HEMORROIDECTOMY     • MITRAL  VALVE REPLACEMENT     • PACEMAKER IMPLANTATION      Cardiac Pacemaker Insertion   • VASECTOMY         Family History  Family History   Problem Relation Age of Onset   • Alzheimer's disease Mother    • Diabetes Father    • Stroke Father        Social History  Social History     Socioeconomic History   • Marital status:      Spouse name: Not on file   • Number of children: Not on file   • Years of education: Not on file   • Highest education level: Not on file   Tobacco Use   • Smoking status: Never Smoker   • Smokeless tobacco: Never Used   Substance and Sexual Activity   • Alcohol use: No   • Drug use: No   • Sexual activity: Defer         Review of Systems:  History obtained from chart review and the patient  General ROS: No fever or chills  Respiratory ROS: No cough, shortness of breath, wheezing  Cardiovascular ROS: No chest pain or palpitations  Gastrointestinal ROS: No abdominal pain or melena  Genito-Urinary ROS: No dysuria or hematuria    Objective:  Blood pressure: 63/36 mmHg  Heart rate is 67 bpm  O2 saturation 98%.    General: awake/alert   HEENT: Normocephalic atraumatic head  Chest:  clear to auscultation bilaterally without respiratory distress  CVS: regular rate and rhythm  Abdominal: soft, nontender, positive bowel sounds  Extremities: ble edema  Skin: warm/dry with ble stasis changes      Labs:  Results from last 7 days   Lab Units 01/08/20  0530 01/06/20  0546   WBC 10*3/mm3 7.20 8.70   HEMOGLOBIN g/dL 8.3* 8.7*   HEMATOCRIT % 25.0* 25.9*   PLATELETS 10*3/mm3 62* 76*         Results from last 7 days   Lab Units 01/08/20  0530 01/06/20  0546 01/05/20  0423   SODIUM mmol/L 135* 137 137   POTASSIUM mmol/L 4.1 4.2 4.0   CHLORIDE mmol/L 96* 97* 97*   CO2 mmol/L 26.0 26.0 25.0   BUN mg/dL 16 17 12   CREATININE mg/dL 4.76* 4.92* 3.77*   CALCIUM mg/dL 10.2 10.3 10.2   GLUCOSE mg/dL 95 105* 117*       Radiology:   Imaging Results (Last 72 Hours)     ** No results found for the last 72 hours. **           Culture:  No results found for: BLOODCX, URINECX, WOUNDCX, MRSACX, RESPCX, STOOLCX      Assessment   1.  Acute kidney injury/dialysis dependent.  2.  Stage III chronic kidney disease baseline.  3.  Hepatorenal syndrome.  4.  Acute on chronic sy diastolic CHF.  5.  Chronic thrombocytopenia.  6.  Anasarca/volume overload.  7.  Chronic hypotension    Plan:  1.  Hemodialysis treatment tomorrow.  2.  Increase midodrine.  3.  Ultrafiltration up to 3000 cc.      Sravan Adam MD  1/9/2020  3:45 PM

## 2020-01-10 VITALS — HEIGHT: 70 IN | WEIGHT: 295.42 LBS | BODY MASS INDEX: 42.29 KG/M2

## 2020-01-10 LAB
AMMONIA BLD-SCNC: 26 UMOL/L (ref 16–60)
ANION GAP SERPL CALCULATED.3IONS-SCNC: 13 MMOL/L (ref 5–15)
APTT PPP: 60.7 SECONDS (ref 24.1–35)
BASOPHILS # BLD AUTO: 0.08 10*3/MM3 (ref 0–0.2)
BASOPHILS NFR BLD AUTO: 1.5 % (ref 0–1.5)
BUN BLD-MCNC: 16 MG/DL (ref 8–23)
BUN/CREAT SERPL: 3.5 (ref 7–25)
CALCIUM SPEC-SCNC: 9.9 MG/DL (ref 8.6–10.5)
CHLORIDE SERPL-SCNC: 94 MMOL/L (ref 98–107)
CO2 SERPL-SCNC: 27 MMOL/L (ref 22–29)
CREAT BLD-MCNC: 4.59 MG/DL (ref 0.76–1.27)
DEPRECATED RDW RBC AUTO: 69.2 FL (ref 37–54)
EOSINOPHIL # BLD AUTO: 0.17 10*3/MM3 (ref 0–0.4)
EOSINOPHIL NFR BLD AUTO: 3.2 % (ref 0.3–6.2)
ERYTHROCYTE [DISTWIDTH] IN BLOOD BY AUTOMATED COUNT: 21.2 % (ref 12.3–15.4)
GFR SERPL CREATININE-BSD FRML MDRD: 13 ML/MIN/1.73
GFR SERPL CREATININE-BSD FRML MDRD: ABNORMAL ML/MIN/{1.73_M2}
GLUCOSE BLD-MCNC: 75 MG/DL (ref 65–99)
HCT VFR BLD AUTO: 23.1 % (ref 37.5–51)
HGB BLD-MCNC: 7.8 G/DL (ref 13–17.7)
INR PPP: 3.77 (ref 0.91–1.09)
LYMPHOCYTES # BLD AUTO: 0.43 10*3/MM3 (ref 0.7–3.1)
LYMPHOCYTES NFR BLD AUTO: 8 % (ref 19.6–45.3)
MCH RBC QN AUTO: 30.5 PG (ref 26.6–33)
MCHC RBC AUTO-ENTMCNC: 33.8 G/DL (ref 31.5–35.7)
MCV RBC AUTO: 90.2 FL (ref 79–97)
MONOCYTES # BLD AUTO: 0.83 10*3/MM3 (ref 0.1–0.9)
MONOCYTES NFR BLD AUTO: 15.5 % (ref 5–12)
NEUTROPHILS # BLD AUTO: 3.83 10*3/MM3 (ref 1.7–7)
NEUTROPHILS NFR BLD AUTO: 71.2 % (ref 42.7–76)
PLATELET # BLD AUTO: 54 10*3/MM3 (ref 140–450)
PMV BLD AUTO: 11.9 FL (ref 6–12)
POTASSIUM BLD-SCNC: 4 MMOL/L (ref 3.5–5.2)
PROTHROMBIN TIME: 38.7 SECONDS (ref 11.9–14.6)
RBC # BLD AUTO: 2.56 10*6/MM3 (ref 4.14–5.8)
SODIUM BLD-SCNC: 134 MMOL/L (ref 136–145)
TSH SERPL DL<=0.05 MIU/L-ACNC: 3 UIU/ML (ref 0.27–4.2)
WBC NRBC COR # BLD: 5.37 10*3/MM3 (ref 3.4–10.8)

## 2020-01-10 PROCEDURE — 25010000002 ALBUMIN HUMAN 25% PER 50 ML: Performed by: INTERNAL MEDICINE

## 2020-01-10 PROCEDURE — P9047 ALBUMIN (HUMAN), 25%, 50ML: HCPCS | Performed by: INTERNAL MEDICINE

## 2020-01-10 PROCEDURE — 82140 ASSAY OF AMMONIA: CPT | Performed by: INTERNAL MEDICINE

## 2020-01-10 PROCEDURE — 85730 THROMBOPLASTIN TIME PARTIAL: CPT | Performed by: INTERNAL MEDICINE

## 2020-01-10 PROCEDURE — 84443 ASSAY THYROID STIM HORMONE: CPT | Performed by: INTERNAL MEDICINE

## 2020-01-10 PROCEDURE — 80048 BASIC METABOLIC PNL TOTAL CA: CPT | Performed by: INTERNAL MEDICINE

## 2020-01-10 PROCEDURE — 85610 PROTHROMBIN TIME: CPT | Performed by: INTERNAL MEDICINE

## 2020-01-10 PROCEDURE — 85025 COMPLETE CBC W/AUTO DIFF WBC: CPT | Performed by: INTERNAL MEDICINE

## 2020-01-10 RX ORDER — WARFARIN SODIUM 2 MG/1
1 TABLET ORAL
Status: DISCONTINUED | OUTPATIENT
Start: 2020-01-11 | End: 2020-01-11

## 2020-01-10 NOTE — PROGRESS NOTES
[facility]  Gabriel Gallego M.D.  SHARRON Gallego M.D.  GLORIA Shafer APRN      Internal Medicine Progress Note    1/10/2020   11:26 AM    Name:  Pranay Gutierrez  MRN:    [id number]     Acct:     [id number]   Room:  [address] [place] [address] Day: 0     Admit Date: 12/12/2019  3:06 PM  PCP: Dwaine Wiley PA-C    Subjective:     C/C: Continued HD and rehabilitation    Interval History: Status: Improved. Resting in bed. No family at bedside. Woke from sleep. Not wearing bipap. Off levophed. Seen on HD - not tolerating well - has had three bottles of albumin and fluid bolus and SBP remains in 60s. Family has opted for DNR. Patient with increased lethargy and confusion.       Review of Systems   Unable to perform ROS: mental status change     Medications:     Allergies:   Allergies   Allergen Reactions   • Adhesive Tape Other (See Comments)   • Amoxicillin Other (See Comments)     unknkown   • Silicone Other (See Comments)     unknown   • Penicillins Hives   • Sulfamethoxazole-Trimethoprim Nausea Only       Current Meds:   Current Facility-Administered Medications:   •  acetaminophen (TYLENOL) tablet 500 mg, 500 mg, Oral, Q6H PRN, Maxwell Gallego MD  •  albumin human 25 % IV SOLN 12.5 g, 12.5 g, Intravenous, Q5 Min PRN, Renny Floyd MD  •  b complex-vitamin c-folic acid (NEPHRO-JENA) tablet 1 tablet, 1 tablet, Oral, Daily, Maxwell Gallego MD  •  bacitracin ointment, , Topical, Q12H, Maxwell Gallego MD  •  dextrose (D50W) 25 g/ 50mL Intravenous Solution 25 g, 25 g, Intravenous, Q15 Min PRN, Maxwell Gallego MD  •  dextrose (GLUTOSE) oral gel 15 g, 15 g, Oral, Q15 Min PRN, Maxwell Gallego MD  •  epoetin haylee (EPOGEN,PROCRIT) injection 10,000 Units, 10,000 Units, Subcutaneous, Weekly, Sravan Adam MD  •  flecainide (TAMBOCOR) tablet 25 mg, 25 mg, Oral, Q12H, Maxwell Gallego MD  •  folic acid (FOLVITE) tablet 1 mg, 1 mg, Oral, Daily, Julián  Maxwell Ho MD  •  glucagon (human recombinant) (GLUCAGEN DIAGNOSTIC) injection 1 mg, 1 mg, Subcutaneous, Q15 Min PRN, Maxwell Gallego MD  •  heparin (porcine) injection 2,000 Units, 2,000 Units, Intracatheter, PRN, Renny Floyd MD, 2,000 Units at 12/18/19 1220  •  heparin (porcine) injection 2,000 Units, 2,000 Units, Intracatheter, PRN, Renny Floyd MD, 2,000 Units at 12/18/19 1221  •  heparin (porcine) injection 4,100 Units, 4,100 Units, Intravenous, Once, Adriel Viera MD  •  heparin (porcine) injection 4,100 Units, 4,100 Units, Intravenous, Once, Adriel Viera MD  •  hypromellose (ISOPTO TEARS) 0.5 % ophthalmic solution 1 drop, 1 drop, Both Eyes, Q4H PRN, Maxwell Gallego MD  •  lactulose solution 20 g, 20 g, Oral, Daily, Maxwell Gallego MD  •  latanoprost (XALATAN) 0.005 % ophthalmic solution 1 drop, 1 drop, Left Eye, Nightly, Maxwell Gallego MD  •  melatonin tablet 6 mg, 6 mg, Oral, Nightly, Maxwell Gallego MD  •  midodrine (PROAMATINE) tablet 20 mg, 20 mg, Oral, Q8H, Sravan Adam MD  •  ondansetron (ZOFRAN) tablet 4 mg, 4 mg, Oral, Q6H PRN **OR** ondansetron (ZOFRAN) injection 4 mg, 4 mg, Intravenous, Q6H PRN, Maxwell Gallego MD  •  pantoprazole (PROTONIX) EC tablet 40 mg, 40 mg, Oral, Q AM, Maxwell Gallego MD  •  riFAXIMin (XIFAXAN) tablet 550 mg, 550 mg, Oral, Q12H, Maxwell Gallego MD  •  rOPINIRole (REQUIP) tablet 0.25 mg, 0.25 mg, Oral, TID, Maxwell Gallego MD  •  sodium chloride nasal spray 1 spray, 1 spray, Each Nare, Q1H PRN, Maxwell Gallego MD  •  vitamin A & D ointment, , Topical, Q12H, Maxwell Gallego MD  •  Meseret's amazing butt cream, , Topical, PRN, Celina Cedeno, GLORIA  •  Meseret's amazing butt cream, , Topical, TID, Celina Cedeno, GLORIA  •  Meseret's amazing butt cream, , Topical, BID, Celina Cedeno, GLORIA  •  warfarin (COUMADIN) tablet 1 mg, 1 mg, Oral,  "Daily, Maxwell Gallego MD    Data:     Code Status:    There are no questions and answers to display.       Family History   Problem Relation Age of Onset   • Alzheimer's disease Mother    • Diabetes Father    • Stroke Father        Social History     Socioeconomic History   • Marital status:      Spouse name: Not on file   • Number of children: Not on file   • Years of education: Not on file   • Highest education level: Not on file   Tobacco Use   • Smoking status: Never Smoker   • Smokeless tobacco: Never Used   Substance and Sexual Activity   • Alcohol use: No   • Drug use: No   • Sexual activity: Defer       Vitals:  Ht 177.8 cm (70\")   Wt 134 kg (295 lb 6.7 oz)   BMI 42.39 kg/m²   T 97.1 P 70 R 12 BP 84/69 Sp02 96% (room air)  I/O (24Hr):  No intake or output data in the 24 hours ending 01/10/20 1126    Labs and imaging:      Lab Results (last 24 hours)     Procedure Component Value Units Date/Time    CBC & Differential [[id number]] Collected:  01/10/20 0448    Specimen:  Blood Updated:  01/10/20 0542    Narrative:       The following orders were created for panel order CBC & Differential.  Procedure                               Abnormality         Status                     ---------                               -----------         ------                     CBC Auto Differential[[id number]]        Abnormal            Final result                 Please view results for these tests on the individual orders.    CBC Auto Differential [[id number]]  (Abnormal) Collected:  01/10/20 0448    Specimen:  Blood Updated:  01/10/20 0542     WBC 5.37 10*3/mm3      RBC 2.56 10*6/mm3      Hemoglobin 7.8 g/dL      Hematocrit 23.1 %      MCV 90.2 fL      MCH 30.5 pg      MCHC 33.8 g/dL      RDW 21.2 %      RDW-SD 69.2 fl      MPV 11.9 fL      Platelets 54 10*3/mm3      Neutrophil % 71.2 %      Lymphocyte % 8.0 %      Monocyte % 15.5 %      Eosinophil % 3.2 %      Basophil % 1.5 %      Neutrophils, Absolute 3.83 " 10*3/mm3      Lymphocytes, Absolute 0.43 10*3/mm3      Monocytes, Absolute 0.83 10*3/mm3      Eosinophils, Absolute 0.17 10*3/mm3      Basophils, Absolute 0.08 10*3/mm3     aPTT [[id number]]  (Abnormal) Collected:  01/10/20 0448    Specimen:  Blood Updated:  01/10/20 0537     PTT 60.7 seconds     Protime-INR [[id number]]  (Abnormal) Collected:  01/10/20 0448    Specimen:  Blood Updated:  01/10/20 0537     Protime 38.7 Seconds      INR 3.77    Basic Metabolic Panel [[id number]]  (Abnormal) Collected:  01/10/20 0448    Specimen:  Blood Updated:  01/10/20 0536     Glucose 75 mg/dL      BUN 16 mg/dL      Creatinine 4.59 mg/dL      Sodium 134 mmol/L      Potassium 4.0 mmol/L      Chloride 94 mmol/L      CO2 27.0 mmol/L      Calcium 9.9 mg/dL      eGFR   Amer --     Comment: <15 Indicative of kidney failure.        eGFR Non African Amer 13 mL/min/1.73      Comment: <15 Indicative of kidney failure.        BUN/Creatinine Ratio 3.5     Anion Gap 13.0 mmol/L     Narrative:       GFR Normal >60  Chronic Kidney Disease <60  Kidney Failure <15              Xr Chest 1 View    Result Date: 12/19/2019  Narrative: HISTORY: Verify PICC placement  CXR: A frontal view the chest obtained.  COMPARISON: 12/12/2019  FINDINGS: Left upper extremity PICC line is in place with the tip projecting over the SVC. There is a tunneled right internal jugular permacatheter. There is left subclavian cardiac pacer device. The prosthetic coronary valve with median sternotomy wires.  Cardiac silhouette is enlarged. Probable patchy left basilar atelectasis with a questionable trace left pleural effusion. No pneumothorax.      Impression: 1. Left approximately PICC line tip projecting in the SVC. Additional lines described above. 2. Cardiomegaly with probable patchy left basilar atelectasis. This report was finalized on 12/19/2019 14:54 by Dr. Ivanna Eduardo MD.    Xr Chest 1 View    Result Date: 12/12/2019  Narrative: Exam:   XR CHEST 1 VW-   Date:   12/12/2019  History:  Male, age  64 years;confirmation of all lines (ETT, tracheostomy tube, central venous catheters) on arrival  COMPARISON:  Chest x-ray dated 12/05/2018.  Findings : There is a new central venous catheter, IJ approach, terminating over the mid to low SVC region. The left-sided cardiac device with leads project over the right atrium and right ventricle. Prior aortic valve replacement. Median sternotomy wires appear intact. The heart and mediastinum are unchanged in size. Obscuration of the left lung base, concerning for consolidative process.  The bones show no acute pathology.       Impression: Impression:  1.  Lines as described above. 2.  Obscuration of the left lung base, concerning for a left lower lobe consolidative process.  This report was finalized on 12/12/2019 20:39 by Dr. Sana Terrazas MD.      Physical Examination:        Physical Exam   Constitutional: Vital signs are normal. He appears well-developed and well-nourished. He appears lethargic. He is cooperative. He appears ill.   HENT:   Head: Normocephalic and atraumatic.   Nose: Nose normal.   Mouth/Throat: Oropharynx is clear and moist.   Eyes: Pupils are equal, round, and reactive to light. Conjunctivae, EOM and lids are normal.   Neck: Trachea normal and normal range of motion. Neck supple.   Cardiovascular: Normal rate, regular rhythm and normal heart sounds.   Pulmonary/Chest: Effort normal and breath sounds normal.   Abdominal: Soft. Normal appearance and bowel sounds are normal.   Obese   Musculoskeletal: Normal range of motion. He exhibits edema (diffuse).   Generalized weakness   Neurological: He has normal strength. He appears lethargic. No cranial nerve deficit or sensory deficit.   Drowsy  confused   Skin: Skin is warm, dry and intact. No petechiae and no rash noted. No cyanosis or erythema. Nails show no clubbing.   Scattered ecchymosis bilateral upper extremities  Jaundice  BLE discolored   Psychiatric: He has a  normal mood and affect. His speech is normal and behavior is normal. Judgment and thought content normal. Cognition and memory are normal.   Nursing note and vitals reviewed.        Assessment:             * No active hospital problems. *    Past Medical History:   Diagnosis Date   • Atelectasis, left 12/9/2018   • Cerebrovascular disease, acute    • Chest pain    • Chronic atrial fibrillation (CMS/HCC)      ablation X 3; followed by Dr. Perez chronically anticoagulated with Coumadin   • Class 3 severe obesity with serious comorbidity and body mass index (BMI) of 40.0 to 44.9 in adult (CMS/HCC) 12/9/2018   • COPD (chronic obstructive pulmonary disease) (CMS/HCC)    • Diaphragm dysfunction 12/9/2018   • Glaucoma    • Hypertrophic cardiomyopathy (CMS/HCC)    • Intermittent palpitations    • Lung nodule 6/24/2019   • Mitral valve replaced    • Mixed hyperlipidemia    • Moderate persistent asthma without complication 12/9/2018   • Obstructive sleep apnea 12/9/2018   • Obstructive sleep apnea, adult    • Pacemaker    • Rash 6/24/2019    Arms and legs   • Restrictive lung disease 12/9/2018   • Stroke (CMS/HCC)     x2        Plan:        1. Acute on chronic diastolic CHF  2. Acute kidney injury on CKD4  3. Multifactorial anemia  4. Chronic anticoagulation  5. MINH  6. COPD  7. Hx CVA  8. Hypertrophic cardiomyopathy  9. Hx MVR  10. Atrial fibrillation  11. PATTERSON with cirrhosis  12. Hyperthyroidism  13. Thrombocytopenia, most likely secondary to liver disease and consumption due to acute illness  14. Supratherapeutic INR     Continue current treatment. Monitor counts. Increase activity as tolerated. Aggressive therapies as tolerated. Labs with HD Monday. Continue blood pressure support as needed with levophed. Appreciate cardiology input. Hold coumadin for inr > 3.5.  HD per nephrology. Monitor platelets closely. Encourage compliance with fluid restriction. DNR. Check ammonia level and ABG.       Electronically signed by  GLORIA Huggins on 1/10/2020 at 11:26 AM

## 2020-01-10 NOTE — PROGRESS NOTES
Nephrology ([facility]) Progress Note      Patient:  Pranay Gutierrez  YOB: 1955  Date of Service: 1/10/2020  MRN: [id number]   Acct: [id number]   Primary Care Physician: Dwaine Wiley PA-C  Advance Directive:   There are no questions and answers to display.     Admit Date: 12/12/2019       Hospital Day: 0  Referring Provider: Maxwell Gallego MD      Patient personally seen and examined.  Complete chart including Consults, Notes, Operative Reports, Labs, Cardiology, and Radiology studies reviewed as able.    Chief complaint: Acute kidney injury/dialysis dependent.    Subjective:  Pranay Gutierrez is a 64 y.o. male  whom we were consulted for SARAH on HD.  First HD 10/27.    Patient was admitted initially at [facility].  He was then transferred to [facility] where he had CRRT.  After stabilization he was transferred to long-term acute care hospital here at [facility].  Due to complexity of his medical problem including cirrhosis of liver, diastolic CHF and acute kidney injury,  He was admitted to LTAC. Finally he has been off the Levophed and blood pressure is borderline low.  He is currently on midodrine 20 mg p.o. 3 times daily.  His systolic blood pressure is still 60 mmHg.    Currently seen on hemodialysis  [facility]  Hemodialysis: 3-1/2-hour  Ultrafiltration: 3000 cc  2K bath  Blood flow rate is 300 cc/min.        Allergies:  Adhesive tape; Amoxicillin; Silicone; Penicillins; and Sulfamethoxazole-trimethoprim    Home Meds:  Medications Prior to Admission   Medication Sig Dispense Refill Last Dose   • ASMANEX 120 METERED DOSES 220 MCG/INH inhaler INHALE TWO PUFFS BY MOUTH TWICE A DAY 1 inhaler 11 Taking   • atorvastatin (LIPITOR) 20 MG tablet Take 20 mg by mouth Daily.   Taking   • citalopram (CeleXA) 20 MG tablet Take 20 mg by mouth Daily.   Taking   • desonide (DESOWEN) 0.05 % cream Apply 1 application topically 2  (Two) Times a Day.   Taking   • dofetilide (TIKOSYN) 250 MCG capsule Take 1 capsule by mouth Every 12 (Twelve) Hours. (Patient taking differently: Take 500 mcg by mouth Every 12 (Twelve) Hours.) 60 capsule 11 Taking   • furosemide (LASIX) 80 MG tablet Take 80 mg by mouth Daily.   Taking   • ipratropium (ATROVENT) 0.02 % nebulizer solution Take 500 mcg by nebulization 4 (Four) Times a Day.   Taking   • latanoprost (XALATAN) 0.005 % ophthalmic solution 1 drop Every Night.   Taking   • levalbuterol (XOPENEX HFA) 45 MCG/ACT inhaler Inhale 1-2 puffs Every 4 (Four) Hours As Needed for Wheezing.   Taking   • levalbuterol (XOPENEX) 1.25 MG/3ML nebulizer solution Take 3 mL by nebulization 3 (Three) Times a Day As Needed for Wheezing.   Taking   • magnesium oxide (MAGOX) 400 (241.3 Mg) MG tablet tablet Take 400 mg by mouth 2 (Two) Times a Day.   Taking   • Melatonin 10 MG tablet Take 10 mg by mouth Every Night.   Taking   • metOLazone (ZAROXOLYN) 2.5 MG tablet Take 2.5 mg by mouth Daily. 1 TABLET PO EVERY Monday, Wednesday & Friday   Taking   • metoprolol succinate XL (TOPROL-XL) 50 MG 24 hr tablet Take 75 mg by mouth 2 (Two) Times a Day.   Taking   • Metoprolol Tartrate 75 MG tablet Take 75 mg by mouth 2 (Two) Times a Day.   Taking   • Mometasone Furoate (ASMANEX HFA) 200 MCG/ACT aerosol Inhale 2 puffs 2 (Two) Times a Day.   Taking   • potassium chloride (K-DUR) 10 MEQ CR tablet Take 10 mEq by mouth 2 (Two) Times a Day.   Taking   • potassium chloride (K-DUR,KLOR-CON) 20 MEQ CR tablet Take 20 mEq by mouth 2 (Two) Times a Day. 30 mg am & 30 mg pm   Taking   • propylthiouracil (PTU) 50 MG tablet Take 50 mg by mouth Daily.   Taking   • spironolactone (ALDACTONE) 25 MG tablet Take 25 mg by mouth Daily.   Taking   • warfarin (COUMADIN) 2 MG tablet Take 2.5 mg by mouth Daily.   Taking       Medicines:  Current Facility-Administered Medications   Medication Dose Route Frequency Provider Last Rate Last Dose   • acetaminophen  (TYLENOL) tablet 500 mg  500 mg Oral Q6H PRN Maxwell Gallego MD       • albumin human 25 % IV SOLN 12.5 g  12.5 g Intravenous Q5 Min PRN Renny Floyd MD       • b complex-vitamin c-folic acid (NEPHRO-JENA) tablet 1 tablet  1 tablet Oral Daily Maxwell Gallego MD       • bacitracin ointment   Topical Q12H Maxwell Gallego MD       • dextrose (D50W) 25 g/ 50mL Intravenous Solution 25 g  25 g Intravenous Q15 Min PRN Maxwell Gallego MD       • dextrose (GLUTOSE) oral gel 15 g  15 g Oral Q15 Min PRN Maxwell Gallego MD       • epoetin haylee (EPOGEN,PROCRIT) injection 10,000 Units  10,000 Units Subcutaneous Weekly Sravan Adam MD       • flecainide (TAMBOCOR) tablet 25 mg  25 mg Oral Q12H Maxwell Gallego MD       • folic acid (FOLVITE) tablet 1 mg  1 mg Oral Daily Maxwell Gallego MD       • glucagon (human recombinant) (GLUCAGEN DIAGNOSTIC) injection 1 mg  1 mg Subcutaneous Q15 Min PRN Maxwell Gallego MD       • heparin (porcine) injection 2,000 Units  2,000 Units Intracatheter PRN Renny Floyd MD   2,000 Units at 12/18/19 1220   • heparin (porcine) injection 2,000 Units  2,000 Units Intracatheter PRN Renny Floyd MD   2,000 Units at 12/18/19 1221   • heparin (porcine) injection 4,100 Units  4,100 Units Intravenous Once Adriel Viera MD       • heparin (porcine) injection 4,100 Units  4,100 Units Intravenous Once Adriel Viera MD       • hypromellose (ISOPTO TEARS) 0.5 % ophthalmic solution 1 drop  1 drop Both Eyes Q4H PRN Maxwell Gallego MD       • lactulose solution 20 g  20 g Oral Daily Maxwell Gallego MD       • latanoprost (XALATAN) 0.005 % ophthalmic solution 1 drop  1 drop Left Eye Nightly Maxwell Gallego MD       • melatonin tablet 6 mg  6 mg Oral Nightly Maxwell Gallego MD       • midodrine (PROAMATINE) tablet 20 mg  20 mg Oral Q8H Sravan Adam MD       • ondansetron  (ZOFRAN) tablet 4 mg  4 mg Oral Q6H PRN Maxwell Gallego MD        Or   • ondansetron (ZOFRAN) injection 4 mg  4 mg Intravenous Q6H PRN Maxwell Gallego MD       • pantoprazole (PROTONIX) EC tablet 40 mg  40 mg Oral Q AM Maxwell Gallego MD       • riFAXIMin (XIFAXAN) tablet 550 mg  550 mg Oral Q12H Maxwell Gallego MD       • rOPINIRole (REQUIP) tablet 0.25 mg  0.25 mg Oral TID Maxwell Gallego MD       • sodium chloride nasal spray 1 spray  1 spray Each Nare Q1H PRN Maxwell Gallego MD       • vitamin A & D ointment   Topical Q12H Maxwell Gallego MD       • Meseret's amazing butt cream   Topical PRN Celina Cedeno, APRN       • Meseret's amazing butt cream   Topical TID Celina Cedeno, APRN       • Meseret's amazing butt cream   Topical BID Celina Cedeno, APRN       • warfarin (COUMADIN) tablet 1 mg  1 mg Oral Daily Maxwell Gallego MD           Past Medical History:  Past Medical History:   Diagnosis Date   • Atelectasis, left 12/9/2018   • Cerebrovascular disease, acute    • Chest pain    • Chronic atrial fibrillation (CMS/HCC)      ablation X 3; followed by Dr. Perez chronically anticoagulated with Coumadin   • Class 3 severe obesity with serious comorbidity and body mass index (BMI) of 40.0 to 44.9 in adult (CMS/HCC) 12/9/2018   • COPD (chronic obstructive pulmonary disease) (CMS/HCC)    • Diaphragm dysfunction 12/9/2018   • Glaucoma    • Hypertrophic cardiomyopathy (CMS/HCC)    • Intermittent palpitations    • Lung nodule 6/24/2019   • Mitral valve replaced    • Mixed hyperlipidemia    • Moderate persistent asthma without complication 12/9/2018   • Obstructive sleep apnea 12/9/2018   • Obstructive sleep apnea, adult    • Pacemaker    • Rash 6/24/2019    Arms and legs   • Restrictive lung disease 12/9/2018   • Stroke (CMS/HCC)     x2       Past Surgical History:  Past Surgical History:   Procedure Laterality Date   • APPENDECTOMY     • CARDIAC ABLATION   01/03/2012   • CORONARY ARTERY BYPASS GRAFT     • HEMORROIDECTOMY     • MITRAL VALVE REPLACEMENT     • PACEMAKER IMPLANTATION      Cardiac Pacemaker Insertion   • VASECTOMY         Family History  Family History   Problem Relation Age of Onset   • Alzheimer's disease Mother    • Diabetes Father    • Stroke Father        Social History  Social History     Socioeconomic History   • Marital status:      Spouse name: Not on file   • Number of children: Not on file   • Years of education: Not on file   • Highest education level: Not on file   Tobacco Use   • Smoking status: Never Smoker   • Smokeless tobacco: Never Used   Substance and Sexual Activity   • Alcohol use: No   • Drug use: No   • Sexual activity: Defer         Review of Systems:  History obtained from chart review and the patient  General ROS: No fever or chills  Respiratory ROS: No cough, shortness of breath, wheezing  Cardiovascular ROS: No chest pain or palpitations  Gastrointestinal ROS: No abdominal pain or melena  Genito-Urinary ROS: No dysuria or hematuria    Objective:  Blood pressure: 62/40 mmHg.  Heart rate is 67 bpm  O2 saturation 98%.    General: awake/alert but Confused.  HEENT: Normocephalic atraumatic head  Chest:  clear to auscultation bilaterally without respiratory distress  CVS: regular rate and rhythm  Abdominal: soft, nontender, positive bowel sounds  Extremities: ble edema  Skin: warm/dry with ble stasis changes      Labs:  Results from last 7 days   Lab Units 01/10/20  0448 01/08/20  0530 01/06/20  0546   WBC 10*3/mm3 5.37 7.20 8.70   HEMOGLOBIN g/dL 7.8* 8.3* 8.7*   HEMATOCRIT % 23.1* 25.0* 25.9*   PLATELETS 10*3/mm3 54* 62* 76*         Results from last 7 days   Lab Units 01/10/20  0448 01/08/20  0530 01/06/20  0546   SODIUM mmol/L 134* 135* 137   POTASSIUM mmol/L 4.0 4.1 4.2   CHLORIDE mmol/L 94* 96* 97*   CO2 mmol/L 27.0 26.0 26.0   BUN mg/dL 16 16 17   CREATININE mg/dL 4.59* 4.76* 4.92*   CALCIUM mg/dL 9.9 10.2 10.3    GLUCOSE mg/dL 75 95 105*       Radiology:   Imaging Results (Last 72 Hours)     ** No results found for the last 72 hours. **          Culture:  No results found for: BLOODCX, URINECX, WOUNDCX, MRSACX, RESPCX, STOOLCX      Assessment   1.  Acute kidney injury/dialysis dependent/seen on hemodialysis..  2.  Stage III chronic kidney disease baseline.  3.  Hepatorenal syndrome.  4.  Acute on chronic sy diastolic CHF.  5.  Chronic thrombocytopenia.  6.  Anasarca/volume overload.  7.  Chronic hypotension    Plan:  1.  Tolerating dialysis very poorly.  He has frequent episode of severe hypotension needing IV albumin, IV fluid and holding UF.  2.  Increase midodrine.  3.  Try to ultrafiltrate up to 3000 cc.      Sravan Adam MD  1/10/2020  11:08 AM

## 2020-01-11 LAB
ALBUMIN SERPL-MCNC: 4.1 G/DL (ref 3.5–5.2)
ALBUMIN/GLOB SERPL: 1.5 G/DL
ALP SERPL-CCNC: 134 U/L (ref 39–117)
ALT SERPL W P-5'-P-CCNC: 24 U/L (ref 1–41)
ANION GAP SERPL CALCULATED.3IONS-SCNC: 14 MMOL/L (ref 5–15)
APTT PPP: 58.9 SECONDS (ref 24.1–35)
AST SERPL-CCNC: 60 U/L (ref 1–40)
BILIRUB SERPL-MCNC: 7.3 MG/DL (ref 0.2–1.2)
BUN BLD-MCNC: 12 MG/DL (ref 8–23)
BUN/CREAT SERPL: 2.9 (ref 7–25)
CALCIUM SPEC-SCNC: 10.1 MG/DL (ref 8.6–10.5)
CHLORIDE SERPL-SCNC: 95 MMOL/L (ref 98–107)
CO2 SERPL-SCNC: 27 MMOL/L (ref 22–29)
CREAT BLD-MCNC: 4.07 MG/DL (ref 0.76–1.27)
GFR SERPL CREATININE-BSD FRML MDRD: 15 ML/MIN/1.73
GLOBULIN UR ELPH-MCNC: 2.7 GM/DL
GLUCOSE BLD-MCNC: 104 MG/DL (ref 65–99)
INR PPP: 3.87 (ref 0.91–1.09)
POTASSIUM BLD-SCNC: 4 MMOL/L (ref 3.5–5.2)
PROT SERPL-MCNC: 6.8 G/DL (ref 6–8.5)
PROTHROMBIN TIME: 39.5 SECONDS (ref 11.9–14.6)
SODIUM BLD-SCNC: 136 MMOL/L (ref 136–145)

## 2020-01-11 PROCEDURE — 85730 THROMBOPLASTIN TIME PARTIAL: CPT | Performed by: INTERNAL MEDICINE

## 2020-01-11 PROCEDURE — 85610 PROTHROMBIN TIME: CPT | Performed by: INTERNAL MEDICINE

## 2020-01-11 PROCEDURE — 80053 COMPREHEN METABOLIC PANEL: CPT | Performed by: INTERNAL MEDICINE

## 2020-01-11 RX ORDER — WARFARIN SODIUM 2 MG/1
1 TABLET ORAL
Status: DISCONTINUED | OUTPATIENT
Start: 2020-01-13 | End: 2020-01-12

## 2020-01-11 NOTE — PROGRESS NOTES
Nephrology ([facility]) Progress Note      Patient:  Pranay Gutierrez  YOB: 1955  Date of Service: 1/11/2020  MRN: [id number]   Acct: [id number]   Primary Care Physician: Dwaine Wiley PA-C  Advance Directive:   There are no questions and answers to display.     Admit Date: 12/12/2019       Hospital Day: 0  Referring Provider: Maxwell Gallego MD      Patient personally seen and examined.  Complete chart including Consults, Notes, Operative Reports, Labs, Cardiology, and Radiology studies reviewed as able.    Chief complaint: Acute kidney injury/dialysis dependent.    Subjective:  Pranay Gutierrez is a 64 y.o. male  whom we were consulted for SARAH on HD.  First HD 10/27.    [facility]  He was then transferred to [facility] where he had CRRT.  After stabilization he was transferred to long-term acute care hospital here at [place].  Due to complexity of his medical problem including cirrhosis of liver, diastolic CHF and acute kidney injury,  He was admitted to LTAC. Finally he has been off the Levophed and blood pressure is borderline low.  He is currently on midodrine 20 mg p.o. 3 times daily.  His systolic blood pressure is still 60 mmHg    He is currently lying in the bed and confused.      Allergies:  Adhesive tape; Amoxicillin; Silicone; Penicillins; and Sulfamethoxazole-trimethoprim    Home Meds:  Medications Prior to Admission   Medication Sig Dispense Refill Last Dose   • ASMANEX 120 METERED DOSES 220 MCG/INH inhaler INHALE TWO PUFFS BY MOUTH TWICE A DAY 1 inhaler 11 Taking   • atorvastatin (LIPITOR) 20 MG tablet Take 20 mg by mouth Daily.   Taking   • citalopram (CeleXA) 20 MG tablet Take 20 mg by mouth Daily.   Taking   • desonide (DESOWEN) 0.05 % cream Apply 1 application topically 2 (Two) Times a Day.   Taking   • dofetilide (TIKOSYN) 250 MCG capsule Take 1 capsule by mouth Every 12 (Twelve)  Hours. (Patient taking differently: Take 500 mcg by mouth Every 12 (Twelve) Hours.) 60 capsule 11 Taking   • furosemide (LASIX) 80 MG tablet Take 80 mg by mouth Daily.   Taking   • ipratropium (ATROVENT) 0.02 % nebulizer solution Take 500 mcg by nebulization 4 (Four) Times a Day.   Taking   • latanoprost (XALATAN) 0.005 % ophthalmic solution 1 drop Every Night.   Taking   • levalbuterol (XOPENEX HFA) 45 MCG/ACT inhaler Inhale 1-2 puffs Every 4 (Four) Hours As Needed for Wheezing.   Taking   • levalbuterol (XOPENEX) 1.25 MG/3ML nebulizer solution Take 3 mL by nebulization 3 (Three) Times a Day As Needed for Wheezing.   Taking   • magnesium oxide (MAGOX) 400 (241.3 Mg) MG tablet tablet Take 400 mg by mouth 2 (Two) Times a Day.   Taking   • Melatonin 10 MG tablet Take 10 mg by mouth Every Night.   Taking   • metOLazone (ZAROXOLYN) 2.5 MG tablet Take 2.5 mg by mouth Daily. 1 TABLET PO EVERY Monday, Wednesday & Friday   Taking   • metoprolol succinate XL (TOPROL-XL) 50 MG 24 hr tablet Take 75 mg by mouth 2 (Two) Times a Day.   Taking   • Metoprolol Tartrate 75 MG tablet Take 75 mg by mouth 2 (Two) Times a Day.   Taking   • Mometasone Furoate (ASMANEX HFA) 200 MCG/ACT aerosol Inhale 2 puffs 2 (Two) Times a Day.   Taking   • potassium chloride (K-DUR) 10 MEQ CR tablet Take 10 mEq by mouth 2 (Two) Times a Day.   Taking   • potassium chloride (K-DUR,KLOR-CON) 20 MEQ CR tablet Take 20 mEq by mouth 2 (Two) Times a Day. 30 mg am & 30 mg pm   Taking   • propylthiouracil (PTU) 50 MG tablet Take 50 mg by mouth Daily.   Taking   • spironolactone (ALDACTONE) 25 MG tablet Take 25 mg by mouth Daily.   Taking   • warfarin (COUMADIN) 2 MG tablet Take 2.5 mg by mouth Daily.   Taking       Medicines:  Current Facility-Administered Medications   Medication Dose Route Frequency Provider Last Rate Last Dose   • acetaminophen (TYLENOL) tablet 500 mg  500 mg Oral Q6H PRN Maxwell Gallego MD       • albumin human 25 % IV SOLN 12.5 g  12.5  g Intravenous Q5 Min PRN Renny Floyd MD       • b complex-vitamin c-folic acid (NEPHRO-JENA) tablet 1 tablet  1 tablet Oral Daily Maxwell Gallego MD       • bacitracin ointment   Topical Q12H Maxwell Gallego MD       • dextrose (D50W) 25 g/ 50mL Intravenous Solution 25 g  25 g Intravenous Q15 Min PRN Maxwell Gallego MD       • dextrose (GLUTOSE) oral gel 15 g  15 g Oral Q15 Min PRN Maxwell Gallego MD       • epoetin haylee (EPOGEN,PROCRIT) injection 10,000 Units  10,000 Units Subcutaneous Weekly Sravan Adam MD       • flecainide (TAMBOCOR) tablet 25 mg  25 mg Oral Q12H Maxwell Gallego MD       • folic acid (FOLVITE) tablet 1 mg  1 mg Oral Daily Maxwell Gallego MD       • glucagon (human recombinant) (GLUCAGEN DIAGNOSTIC) injection 1 mg  1 mg Subcutaneous Q15 Min PRN Maxwell Gallego MD       • heparin (porcine) injection 2,000 Units  2,000 Units Intracatheter PRN Renny Floyd MD   2,000 Units at 12/18/19 1220   • heparin (porcine) injection 2,000 Units  2,000 Units Intracatheter PRN Renny Floyd MD   2,000 Units at 12/18/19 1221   • heparin (porcine) injection 4,100 Units  4,100 Units Intravenous Once Adriel Viera MD       • heparin (porcine) injection 4,100 Units  4,100 Units Intravenous Once Adriel Viera MD       • hypromellose (ISOPTO TEARS) 0.5 % ophthalmic solution 1 drop  1 drop Both Eyes Q4H PRN Maxwell Gallego MD       • lactulose solution 20 g  20 g Oral Daily Maxwell Gallego MD       • latanoprost (XALATAN) 0.005 % ophthalmic solution 1 drop  1 drop Left Eye Nightly Maxwell Gallego MD       • melatonin tablet 6 mg  6 mg Oral Nightly Maxwell Gallego MD       • midodrine (PROAMATINE) tablet 20 mg  20 mg Oral Q8H Sravan Adam MD       • ondansetron (ZOFRAN) tablet 4 mg  4 mg Oral Q6H PRN Maxwell Gallego MD        Or   • ondansetron (ZOFRAN) injection 4 mg  4 mg  Intravenous Q6H PRN Maxwell Gallego MD       • pantoprazole (PROTONIX) EC tablet 40 mg  40 mg Oral Q AM Maxwell Gallego MD       • riFAXIMin (XIFAXAN) tablet 550 mg  550 mg Oral Q12H Maxwell Gallego MD       • rOPINIRole (REQUIP) tablet 0.25 mg  0.25 mg Oral TID Maxwell Gallego MD       • sodium chloride nasal spray 1 spray  1 spray Each Nare Q1H PRN Maxwell Gallego MD       • vitamin A & D ointment   Topical Q12H Maxwell Gallego MD       • Meseret's amazing butt cream   Topical PRN Celina Cedeno, APRN       • Meseret's amazing butt cream   Topical TID Celina Cedeno, APRN       • Meseret's amazing butt cream   Topical BID Celina Cedeno, APRN       • warfarin (COUMADIN) tablet 1 mg  1 mg Oral Daily Maxwell Gallego MD           Past Medical History:  Past Medical History:   Diagnosis Date   • Atelectasis, left 12/9/2018   • Cerebrovascular disease, acute    • Chest pain    • Chronic atrial fibrillation (CMS/HCC)      ablation X 3; followed by Dr. Perez chronically anticoagulated with Coumadin   • Class 3 severe obesity with serious comorbidity and body mass index (BMI) of 40.0 to 44.9 in adult (CMS/HCC) 12/9/2018   • COPD (chronic obstructive pulmonary disease) (CMS/HCC)    • Diaphragm dysfunction 12/9/2018   • Glaucoma    • Hypertrophic cardiomyopathy (CMS/HCC)    • Intermittent palpitations    • Lung nodule 6/24/2019   • Mitral valve replaced    • Mixed hyperlipidemia    • Moderate persistent asthma without complication 12/9/2018   • Obstructive sleep apnea 12/9/2018   • Obstructive sleep apnea, adult    • Pacemaker    • Rash 6/24/2019    Arms and legs   • Restrictive lung disease 12/9/2018   • Stroke (CMS/HCC)     x2       Past Surgical History:  Past Surgical History:   Procedure Laterality Date   • APPENDECTOMY     • CARDIAC ABLATION  01/03/2012   • CORONARY ARTERY BYPASS GRAFT     • HEMORROIDECTOMY     • MITRAL VALVE REPLACEMENT     • PACEMAKER  IMPLANTATION      Cardiac Pacemaker Insertion   • VASECTOMY         Family History  Family History   Problem Relation Age of Onset   • Alzheimer's disease Mother    • Diabetes Father    • Stroke Father        Social History  Social History     Socioeconomic History   • Marital status:      Spouse name: Not on file   • Number of children: Not on file   • Years of education: Not on file   • Highest education level: Not on file   Tobacco Use   • Smoking status: Never Smoker   • Smokeless tobacco: Never Used   Substance and Sexual Activity   • Alcohol use: No   • Drug use: No   • Sexual activity: Defer         Review of Systems:  History obtained from chart review and the patient  General ROS: No fever or chills  Respiratory ROS: No cough, shortness of breath, wheezing  Cardiovascular ROS: No chest pain or palpitations  Gastrointestinal ROS: No abdominal pain or melena  Genito-Urinary ROS: No dysuria or hematuria    Objective:  Blood pressure: 72/45 mmHg  Heart rate is 67 bpm  O2 saturation 98%.    General: awake/alert but Confused.  HEENT: Normocephalic atraumatic head  Chest:  clear to auscultation bilaterally without respiratory distress  CVS: regular rate and rhythm  Abdominal: soft, nontender, positive bowel sounds  Extremities: ble edema  Skin: warm/dry with ble stasis changes      Labs:  Results from last 7 days   Lab Units 01/10/20  0448 01/08/20  0530 01/06/20  0546   WBC 10*3/mm3 5.37 7.20 8.70   HEMOGLOBIN g/dL 7.8* 8.3* 8.7*   HEMATOCRIT % 23.1* 25.0* 25.9*   PLATELETS 10*3/mm3 54* 62* 76*         Results from last 7 days   Lab Units 01/11/20  0408 01/10/20  0448 01/08/20  0530   SODIUM mmol/L 136 134* 135*   POTASSIUM mmol/L 4.0 4.0 4.1   CHLORIDE mmol/L 95* 94* 96*   CO2 mmol/L 27.0 27.0 26.0   BUN mg/dL 12 16 16   CREATININE mg/dL 4.07* 4.59* 4.76*   CALCIUM mg/dL 10.1 9.9 10.2   BILIRUBIN mg/dL 7.3*  --   --    ALK PHOS U/L 134*  --   --    ALT (SGPT) U/L 24  --   --    AST (SGOT) U/L 60*  --   --     GLUCOSE mg/dL 104* 75 95       Radiology:   Imaging Results (Last 72 Hours)     ** No results found for the last 72 hours. **          Culture:  No results found for: BLOODCX, URINECX, WOUNDCX, MRSACX, RESPCX, STOOLCX      Assessment   1.  Acute kidney injury/dialysis dependent.  2.  Stage III chronic kidney disease baseline.  3.  Hepatorenal syndrome.  4.  Acute on chronic sy diastolic CHF.  5.  Chronic thrombocytopenia.  6.  Anasarca/volume overload.  7.  Chronic hypotension    Plan:  1.  Overall prognosis looks very poor.  2.  Increase midodrine.  3.  Patient is currently DNR.  4.  Continue dialysis Monday Wednesday Friday.      Sravan Adam MD  1/11/2020  2:54 PM

## 2020-01-11 NOTE — PROGRESS NOTES
[facility]  Gabriel Gallego M.D.  SHARRON Gallego M.D.  GLORIA Shafer APRN      Internal Medicine Progress Note    1/11/2020   11:49 AM    Name:  Pranay Gutierrez  MRN:    [id number]     Acct:     [id number]   Room:  [address] [place] [address] Day: 0     Admit Date: 12/12/2019  3:06 PM  PCP: Dwaine Wiley PA-C    Subjective:     C/C: Continued HD and rehabilitation    Interval History: Status: Stable. Resting in bed.  Wife and son at bedside.  Intermittent confusion.  Recent onset of hearing loss vision changes. Off levophed, systolic BP remains greater than 60 and map of at least 40.  Difficulty with HD yesterday due to hypotension, required albumin and fluid bolus.  Now a DNR and family considering comfort care.  Ammonia level within normal limits.  Subtherapeutic INR of 3.87.    Lengthy conversation with spouse and son related to overall poor prognosis and what end-of-life care/comfort care means and consists of.  Are going to discuss and think about what Pranay would want.  Will further discuss tomorrow.    Review of Systems   Unable to perform ROS: mental status change     Medications:     Allergies:   Allergies   Allergen Reactions   • Adhesive Tape Other (See Comments)   • Amoxicillin Other (See Comments)     [id number]   • Silicone Other (See Comments)     unknown   • Penicillins Hives   • Sulfamethoxazole-Trimethoprim Nausea Only       Current Meds:   Current Facility-Administered Medications:   •  acetaminophen (TYLENOL) tablet 500 mg, 500 mg, Oral, Q6H PRN, Maxwell Gallego MD  •  albumin human 25 % IV SOLN 12.5 g, 12.5 g, Intravenous, Q5 Min PRN, Renny Floyd MD  •  b complex-vitamin c-folic acid (NEPHRO-JENA) tablet 1 tablet, 1 tablet, Oral, Daily, Maxwell Gallego MD  •  bacitracin ointment, , Topical, Q12H, Maxwell Gallego MD  •  dextrose (D50W) 25 g/ 50mL Intravenous Solution 25 g, 25 g, Intravenous, Q15 Min PRN, Maxwell Gallego MD  •   dextrose (GLUTOSE) oral gel 15 g, 15 g, Oral, Q15 Min PRN, Maxwell Gallego MD  •  epoetin haylee (EPOGEN,PROCRIT) injection 10,000 Units, 10,000 Units, Subcutaneous, Weekly, Sravan Adam MD  •  flecainide (TAMBOCOR) tablet 25 mg, 25 mg, Oral, Q12H, Maxwell Gallego MD  •  folic acid (FOLVITE) tablet 1 mg, 1 mg, Oral, Daily, Maxwell Gallego MD  •  glucagon (human recombinant) (GLUCAGEN DIAGNOSTIC) injection 1 mg, 1 mg, Subcutaneous, Q15 Min PRN, Maxwell Gallego MD  •  heparin (porcine) injection 2,000 Units, 2,000 Units, Intracatheter, PRN, Renny Floyd MD, 2,000 Units at 12/18/19 1220  •  heparin (porcine) injection 2,000 Units, 2,000 Units, Intracatheter, PRN, Renny Floyd MD, 2,000 Units at 12/18/19 1221  •  heparin (porcine) injection 4,100 Units, 4,100 Units, Intravenous, Once, Adriel Viera MD  •  heparin (porcine) injection 4,100 Units, 4,100 Units, Intravenous, Once, Adriel Viera MD  •  hypromellose (ISOPTO TEARS) 0.5 % ophthalmic solution 1 drop, 1 drop, Both Eyes, Q4H PRN, Maxwell Gallego MD  •  lactulose solution 20 g, 20 g, Oral, Daily, Maxwell Gallego MD  •  latanoprost (XALATAN) 0.005 % ophthalmic solution 1 drop, 1 drop, Left Eye, Nightly, Maxwell Gallego MD  •  melatonin tablet 6 mg, 6 mg, Oral, Nightly, Maxwell Gallego MD  •  midodrine (PROAMATINE) tablet 20 mg, 20 mg, Oral, Q8H, Sravan Adam MD  •  ondansetron (ZOFRAN) tablet 4 mg, 4 mg, Oral, Q6H PRN **OR** ondansetron (ZOFRAN) injection 4 mg, 4 mg, Intravenous, Q6H PRN, aMxwell Gallego MD  •  pantoprazole (PROTONIX) EC tablet 40 mg, 40 mg, Oral, Q AM, Maxwell Gallego MD  •  riFAXIMin (XIFAXAN) tablet 550 mg, 550 mg, Oral, Q12H, Maxwell Gallego MD  •  rOPINIRole (REQUIP) tablet 0.25 mg, 0.25 mg, Oral, TID, Maxwell Gallego MD  •  sodium chloride nasal spray 1 spray, 1 spray, Each Nare, Q1H PRN, Maxwell Gallego,  "MD  •  vitamin A & D ointment, , Topical, Q12H, Maxwell Gallego MD  •  Meseret's amazing butt cream, , Topical, PRN, Celina Cedeno, APRN  •  Meseret's amazing butt cream, , Topical, TID, Celina Cedeno, APRN  •  Meseret's amazing butt cream, , Topical, BID, Celina Cedeno, APRN  •  warfarin (COUMADIN) tablet 1 mg, 1 mg, Oral, Daily, Maxwell Gallego MD    Data:     Code Status:    There are no questions and answers to display.       Family History   Problem Relation Age of Onset   • Alzheimer's disease Mother    • Diabetes Father    • Stroke Father        Social History     Socioeconomic History   • Marital status:      Spouse name: Not on file   • Number of children: Not on file   • Years of education: Not on file   • Highest education level: Not on file   Tobacco Use   • Smoking status: Never Smoker   • Smokeless tobacco: Never Used   Substance and Sexual Activity   • Alcohol use: No   • Drug use: No   • Sexual activity: Defer       Vitals:  Ht 177.8 cm (70\")   Wt 134 kg (295 lb 6.7 oz)   BMI 42.39 kg/m²   T 97.8 P 62 R 17 BP 72/45 Sp02 96% (room air)  I/O (24Hr):  No intake or output data in the 24 hours ending 01/11/20 1149    Labs and imaging:      Lab Results (last 24 hours)     Procedure Component Value Units Date/Time    Comprehensive Metabolic Panel [[id number]]  (Abnormal) Collected:  01/11/20 0408    Specimen:  Blood Updated:  01/11/20 0443     Glucose 104 mg/dL      BUN 12 mg/dL      Creatinine 4.07 mg/dL      Sodium 136 mmol/L      Potassium 4.0 mmol/L      Chloride 95 mmol/L      CO2 27.0 mmol/L      Calcium 10.1 mg/dL      Total Protein 6.8 g/dL      Albumin 4.10 g/dL      ALT (SGPT) 24 U/L      AST (SGOT) 60 U/L      Alkaline Phosphatase 134 U/L      Total Bilirubin 7.3 mg/dL      eGFR Non African Amer 15 mL/min/1.73      Globulin 2.7 gm/dL      A/G Ratio 1.5 g/dL      BUN/Creatinine Ratio 2.9     Anion Gap 14.0 mmol/L     Narrative:       GFR Normal >60  Chronic Kidney " Disease <60  Kidney Failure <15      aPTT [[id number]]  (Abnormal) Collected:  01/11/20 0408    Specimen:  Blood Updated:  01/11/20 0429     PTT 58.9 seconds     Protime-INR [[id number]]  (Abnormal) Collected:  01/11/20 0408    Specimen:  Blood Updated:  01/11/20 0429     Protime 39.5 Seconds      INR 3.87    TSH [[id number]]  (Normal) Collected:  01/10/20 1707    Specimen:  Blood Updated:  01/10/20 1738     TSH 3.000 uIU/mL     Ammonia [[id number]]  (Normal) Collected:  01/10/20 1707    Specimen:  Blood Updated:  01/10/20 1731     Ammonia 26 umol/L             Xr Chest 1 View    Result Date: 12/19/2019  Narrative: HISTORY: Verify PICC placement  CXR: A frontal view the chest obtained.  COMPARISON: 12/12/2019  FINDINGS: Left upper extremity PICC line is in place with the tip projecting over the SVC. There is a tunneled right internal jugular permacatheter. There is left subclavian cardiac pacer device. The prosthetic coronary valve with median sternotomy wires.  Cardiac silhouette is enlarged. Probable patchy left basilar atelectasis with a questionable trace left pleural effusion. No pneumothorax.      Impression: 1. Left approximately PICC line tip projecting in the SVC. Additional lines described above. 2. Cardiomegaly with probable patchy left basilar atelectasis. This report was finalized on 12/19/2019 14:54 by Dr. Ivanna Eduardo MD.    Xr Chest 1 View    Result Date: 12/12/2019  Narrative: Exam:   XR CHEST 1 VW-   Date:  12/12/2019  History:  Male, age  64 years;confirmation of all lines (ETT, tracheostomy tube, central venous catheters) on arrival  COMPARISON:  Chest x-ray dated 12/05/2018.  Findings : There is a new central venous catheter, IJ approach, terminating over the mid to low SVC region. The left-sided cardiac device with leads project over the right atrium and right ventricle. Prior aortic valve replacement. Median sternotomy wires appear intact. The heart and mediastinum are unchanged in size.  Obscuration of the left lung base, concerning for consolidative process.  The bones show no acute pathology.       Impression: Impression:  1.  Lines as described above. 2.  Obscuration of the left lung base, concerning for a left lower lobe consolidative process.  This report was finalized on 12/12/2019 20:39 by Dr. Sana Terrazas MD.      Physical Examination:        Physical Exam   Constitutional: Vital signs are normal. He appears well-developed and well-nourished. He appears lethargic. He is cooperative. He appears ill.   HENT:   Head: Normocephalic and atraumatic.   Nose: Nose normal.   Mouth/Throat: Oropharynx is clear and moist.   Eyes: Pupils are equal, round, and reactive to light. Conjunctivae, EOM and lids are normal.   Neck: Trachea normal and normal range of motion. Neck supple.   Cardiovascular: Normal rate, regular rhythm and normal heart sounds.   Pulmonary/Chest: Effort normal and breath sounds normal.   Abdominal: Soft. Normal appearance and bowel sounds are normal.   Obese   Musculoskeletal: Normal range of motion. He exhibits edema (diffuse).   Generalized weakness   Neurological: He has normal strength. He appears lethargic. No cranial nerve deficit or sensory deficit.   Drowsy  confused   Skin: Skin is warm, dry and intact. No petechiae and no rash noted. No cyanosis or erythema. Nails show no clubbing.   Scattered ecchymosis bilateral upper extremities  Jaundice  BLE discolored   Psychiatric: He has a normal mood and affect. His speech is normal and behavior is normal. Judgment and thought content normal. Cognition and memory are normal.   Nursing note and vitals reviewed.        Assessment:             * No active hospital problems. *    Past Medical History:   Diagnosis Date   • Atelectasis, left 12/9/2018   • Cerebrovascular disease, acute    • Chest pain    • Chronic atrial fibrillation (CMS/HCC)      ablation X 3; followed by Dr. Perez chronically anticoagulated with Coumadin   • Class  3 severe obesity with serious comorbidity and body mass index (BMI) of 40.0 to 44.9 in adult (CMS/[facility]) 12/9/2018   • COPD (chronic obstructive pulmonary disease) (CMS/[facility])    • Diaphragm dysfunction 12/9/2018   • Glaucoma    • Hypertrophic cardiomyopathy (CMS/[facility])    • Intermittent palpitations    • Lung nodule 6/24/2019   • Mitral valve replaced    • Mixed hyperlipidemia    • Moderate persistent asthma without complication 12/9/2018   • Obstructive sleep apnea 12/9/2018   • Obstructive sleep apnea, adult    • Pacemaker    • Rash 6/24/2019    Arms and legs   • Restrictive lung disease 12/9/2018   • Stroke (CMS/[facility])     x2        Plan:        1. Acute on chronic diastolic CHF  2. Acute kidney injury on CKD4  3. Multifactorial anemia  4. Chronic anticoagulation  5. MINH  6. COPD  7. Hx CVA  8. Hypertrophic cardiomyopathy  9. Hx MVR  10. Atrial fibrillation  11. PATTERSON with cirrhosis  12. Hyperthyroidism  13. Thrombocytopenia, most likely secondary to liver disease and consumption due to acute illness  14. Supratherapeutic INR     Continue current treatment. Monitor counts. Increase activity as tolerated.  Now DNR. Labs with HD Monday. Continue blood pressure support as needed with levophed (initiate if systolic pressure is less than 60 and map is less than 40). Appreciate cardiology input. Hold coumadin for inr > 3.5.  HD per nephrology. Monitor platelets closely. Encourage compliance with fluid restriction.      Electronically signed by GLORIA Mireles on 1/11/2020 at 11:49 AM   I have discussed the care of Pranay Gutierrez, including pertinent history and exam findings, with the nurse practitioner.    I have seen and examined the patient and the key elements of all parts of the encounter have been performed by me.  I agree with the assessment, plan and orders as documented by GLORIA Boo, after I modified the exam findings and the plan of treatments and the final version is my approved version of the  assessment.        Electronically signed by Maxwell Gallego MD on 1/11/2020 at 4:05 PM

## 2020-01-12 LAB
APTT PPP: 59.1 SECONDS (ref 24.1–35)
INR PPP: 3.42 (ref 0.91–1.09)
PROTHROMBIN TIME: 35.8 SECONDS (ref 11.9–14.6)

## 2020-01-12 PROCEDURE — 25010000003 MORPHINE PER 10 MG: Performed by: INTERNAL MEDICINE

## 2020-01-12 PROCEDURE — 85610 PROTHROMBIN TIME: CPT | Performed by: INTERNAL MEDICINE

## 2020-01-12 PROCEDURE — 25010000002 LORAZEPAM PER 2 MG: Performed by: INTERNAL MEDICINE

## 2020-01-12 PROCEDURE — 85730 THROMBOPLASTIN TIME PARTIAL: CPT | Performed by: INTERNAL MEDICINE

## 2020-01-12 RX ORDER — ACETAMINOPHEN 325 MG/1
650 TABLET ORAL EVERY 6 HOURS PRN
Status: DISCONTINUED | OUTPATIENT
Start: 2020-01-12 | End: 2020-01-13 | Stop reason: ALTCHOICE

## 2020-01-12 RX ORDER — ACETAMINOPHEN 650 MG/1
650 SUPPOSITORY RECTAL EVERY 4 HOURS PRN
Status: DISCONTINUED | OUTPATIENT
Start: 2020-01-12 | End: 2020-01-13 | Stop reason: ALTCHOICE

## 2020-01-12 RX ORDER — ATROPINE SULFATE 10 MG/ML
2 SOLUTION/ DROPS OPHTHALMIC
Status: DISCONTINUED | OUTPATIENT
Start: 2020-01-12 | End: 2020-01-13 | Stop reason: ALTCHOICE

## 2020-01-12 RX ORDER — MORPHINE SULFATE 1 MG/ML
1 INJECTION, SOLUTION INTRAVENOUS CONTINUOUS
Status: DISCONTINUED | OUTPATIENT
Start: 2020-01-12 | End: 2020-01-13 | Stop reason: HOSPADM

## 2020-01-12 RX ORDER — SODIUM CHLORIDE 9 MG/ML
20 INJECTION, SOLUTION INTRAVENOUS CONTINUOUS
Status: DISCONTINUED | OUTPATIENT
Start: 2020-01-12 | End: 2020-01-13 | Stop reason: ALTCHOICE

## 2020-01-12 RX ORDER — ONDANSETRON 2 MG/ML
4 INJECTION INTRAMUSCULAR; INTRAVENOUS EVERY 6 HOURS PRN
Status: DISCONTINUED | OUTPATIENT
Start: 2020-01-12 | End: 2020-01-13 | Stop reason: ALTCHOICE

## 2020-01-12 RX ORDER — LORAZEPAM 2 MG/ML
1 INJECTION INTRAMUSCULAR
Status: DISCONTINUED | OUTPATIENT
Start: 2020-01-12 | End: 2020-01-13 | Stop reason: HOSPADM

## 2020-01-12 NOTE — PROGRESS NOTES
[facility]  Gabriel Gallego M.D.  SHARRON Gallego M.D.  GLORIA Shafer APRN      Internal Medicine Progress Note    1/12/2020   10:56 AM    Name:  Pranay Gutierrez  MRN:    [id number]     Acct:     [id number]   Room:  [address] [place] [address] Day: 0     Admit Date: 12/12/2019  3:06 PM  PCP: Dwaine Wiley PA-C    Subjective:     C/C: Continued HD and rehabilitation    Interval History: Status: Stable. Resting in bed.  Wife at bedside.  Lethargic and hard to arouse this morning. Difficulty with HD on 1/10/2020 due to hypotension, required albumin and fluid bolus.  Now a DNR and family considering comfort care. Subtherapeutic INR of 3.42.  Presently on home CPAP. Presently with a map of 43, off levophed.  Spoke again with spouse related to poor prognosis and comfort care, she reports she was taking her son to make further decisions.      Review of Systems   Unable to perform ROS: mental status change     Medications:     Allergies:   Allergies   Allergen Reactions   • Adhesive Tape Other (See Comments)   • Amoxicillin Other (See Comments)     unknkown   • Silicone Other (See Comments)     unknown   • Penicillins Hives   • Sulfamethoxazole-Trimethoprim Nausea Only       Current Meds:   Current Facility-Administered Medications:   •  acetaminophen (TYLENOL) tablet 500 mg, 500 mg, Oral, Q6H PRN, Maxwell Gallego MD  •  albumin human 25 % IV SOLN 12.5 g, 12.5 g, Intravenous, Q5 Min PRN, Renny Floyd MD  •  b complex-vitamin c-folic acid (NEPHRO-JENA) tablet 1 tablet, 1 tablet, Oral, Daily, Maxwell Gallego MD  •  bacitracin ointment, , Topical, Q12H, Maxwell Gallego MD  •  dextrose (D50W) 25 g/ 50mL Intravenous Solution 25 g, 25 g, Intravenous, Q15 Min PRN, Maxwell Gallego MD  •  dextrose (GLUTOSE) oral gel 15 g, 15 g, Oral, Q15 Min PRN, Maxwell Gallego MD  •  epoetin haylee (EPOGEN,PROCRIT) injection 10,000 Units, 10,000 Units, Subcutaneous, Weekly, Ali,  MD Sravan  •  flecainide (TAMBOCOR) tablet 25 mg, 25 mg, Oral, Q12H, Maxwell Gallego MD  •  folic acid (FOLVITE) tablet 1 mg, 1 mg, Oral, Daily, Maxwell Gallego MD  •  glucagon (human recombinant) (GLUCAGEN DIAGNOSTIC) injection 1 mg, 1 mg, Subcutaneous, Q15 Min PRN, Maxwell Gallego MD  •  heparin (porcine) injection 2,000 Units, 2,000 Units, Intracatheter, PRN, Renny Floyd MD, 2,000 Units at 12/18/19 1220  •  heparin (porcine) injection 2,000 Units, 2,000 Units, Intracatheter, PRN, Renny Floyd MD, 2,000 Units at 12/18/19 1221  •  heparin (porcine) injection 4,100 Units, 4,100 Units, Intravenous, Once, Adriel Viera MD  •  heparin (porcine) injection 4,100 Units, 4,100 Units, Intravenous, Once, Adriel Viera MD  •  hypromellose (ISOPTO TEARS) 0.5 % ophthalmic solution 1 drop, 1 drop, Both Eyes, Q4H PRN, Maxwell Gallego MD  •  lactulose solution 20 g, 20 g, Oral, Daily, Maxwell Gallego MD  •  latanoprost (XALATAN) 0.005 % ophthalmic solution 1 drop, 1 drop, Left Eye, Nightly, Maxwell Gallego MD  •  melatonin tablet 6 mg, 6 mg, Oral, Nightly, Maxwell Gallego MD  •  midodrine (PROAMATINE) tablet 20 mg, 20 mg, Oral, Q8H, Sravan Adam MD  •  ondansetron (ZOFRAN) tablet 4 mg, 4 mg, Oral, Q6H PRN **OR** ondansetron (ZOFRAN) injection 4 mg, 4 mg, Intravenous, Q6H PRN, Maxwell Gallego MD  •  pantoprazole (PROTONIX) EC tablet 40 mg, 40 mg, Oral, Q AM, Maxwell Gallego MD  •  riFAXIMin (XIFAXAN) tablet 550 mg, 550 mg, Oral, Q12H, Maxwell Gallego MD  •  rOPINIRole (REQUIP) tablet 0.25 mg, 0.25 mg, Oral, TID, Maxwell Gallego MD  •  sodium chloride nasal spray 1 spray, 1 spray, Each Nare, Q1H PRN, Maxwell Gallego MD  •  vitamin A & D ointment, , Topical, Q12H, Maxwell Gallego MD  •  Meseret's amazing butt cream, , Topical, PRN, Celina Cedeno, APRN  •  Meseret's amazing butt cream, ,  "Topical, TID, Celina Cedeno APRVIVIANA  •  Meseret's amazing butt cream, , Topical, BID, Celina Cedeno, APRN  •  [START ON 1/13/2020] warfarin (COUMADIN) tablet 1 mg, 1 mg, Oral, Daily, Maxwell Gallego MD    Data:     Code Status:    There are no questions and answers to display.       Family History   Problem Relation Age of Onset   • Alzheimer's disease Mother    • Diabetes Father    • Stroke Father        Social History     Socioeconomic History   • Marital status:      Spouse name: Not on file   • Number of children: Not on file   • Years of education: Not on file   • Highest education level: Not on file   Tobacco Use   • Smoking status: Never Smoker   • Smokeless tobacco: Never Used   Substance and Sexual Activity   • Alcohol use: No   • Drug use: No   • Sexual activity: Defer       Vitals:  Ht 177.8 cm (70\")   Wt 134 kg (295 lb 6.7 oz)   BMI 42.39 kg/m²   T 97.4 P 60 R 24 BP 63/37 Sp02 95% (on BiPAP )  I/O (24Hr):  No intake or output data in the 24 hours ending 01/12/20 1056    Labs and imaging:      Lab Results (last 24 hours)     Procedure Component Value Units Date/Time    aPTT [[id number]]  (Abnormal) Collected:  01/12/20 0404    Specimen:  Blood Updated:  01/12/20 0449     PTT 59.1 seconds     Protime-INR [[id number]]  (Abnormal) Collected:  01/12/20 0404    Specimen:  Blood Updated:  01/12/20 0449     Protime 35.8 Seconds      INR 3.42            Xr Chest 1 View    Result Date: 12/19/2019  Narrative: HISTORY: Verify PICC placement  CXR: A frontal view the chest obtained.  COMPARISON: 12/12/2019  FINDINGS: Left upper extremity PICC line is in place with the tip projecting over the SVC. There is a tunneled right internal jugular permacatheter. There is left subclavian cardiac pacer device. The prosthetic coronary valve with median sternotomy wires.  Cardiac silhouette is enlarged. Probable patchy left basilar atelectasis with a questionable trace left pleural effusion. No pneumothorax.  "     Impression: 1. Left approximately PICC line tip projecting in the SVC. Additional lines described above. 2. Cardiomegaly with probable patchy left basilar atelectasis. This report was finalized on 12/19/2019 14:54 by Dr. Ivanna Eduardo MD.      Physical Examination:        Physical Exam   Constitutional: Vital signs are normal. He appears well-developed and well-nourished. He appears lethargic. He is cooperative. He appears ill.   HENT:   Head: Normocephalic and atraumatic.   Nose: Nose normal.   Mouth/Throat: Oropharynx is clear and moist.   Eyes: Pupils are equal, round, and reactive to light. Conjunctivae, EOM and lids are normal.   Neck: Trachea normal and normal range of motion. Neck supple.   Cardiovascular: Normal rate, regular rhythm and normal heart sounds.   Pulmonary/Chest: Effort normal and breath sounds normal.   Abdominal: Soft. Normal appearance and bowel sounds are normal.   Obese   Musculoskeletal: Normal range of motion. He exhibits edema (diffuse).   Generalized weakness   Neurological: He has normal strength. He appears lethargic. No cranial nerve deficit or sensory deficit.   Drowsy  confused   Skin: Skin is warm, dry and intact. No petechiae and no rash noted. No cyanosis or erythema. Nails show no clubbing.   Scattered ecchymosis bilateral upper extremities  Jaundice  BLE discolored   Psychiatric: He has a normal mood and affect. His speech is normal and behavior is normal. Judgment and thought content normal. Cognition and memory are normal.   Nursing note and vitals reviewed.        Assessment:             * No active hospital problems. *    Past Medical History:   Diagnosis Date   • Atelectasis, left 12/9/2018   • Cerebrovascular disease, acute    • Chest pain    • Chronic atrial fibrillation (CMS/HCC)      ablation X 3; followed by Dr. Perez chronically anticoagulated with Coumadin   • Class 3 severe obesity with serious comorbidity and body mass index (BMI) of 40.0 to 44.9 in adult  (CMS/HCC) 12/9/2018   • COPD (chronic obstructive pulmonary disease) (CMS/HCC)    • Diaphragm dysfunction 12/9/2018   • Glaucoma    • Hypertrophic cardiomyopathy (CMS/HCC)    • Intermittent palpitations    • Lung nodule 6/24/2019   • Mitral valve replaced    • Mixed hyperlipidemia    • Moderate persistent asthma without complication 12/9/2018   • Obstructive sleep apnea 12/9/2018   • Obstructive sleep apnea, adult    • Pacemaker    • Rash 6/24/2019    Arms and legs   • Restrictive lung disease 12/9/2018   • Stroke (CMS/HCC)     x2        Plan:        1. Acute on chronic diastolic CHF  2. Acute kidney injury on CKD4  3. Multifactorial anemia  4. Chronic anticoagulation  5. MINH  6. COPD  7. Hx CVA  8. Hypertrophic cardiomyopathy  9. Hx MVR  10. Atrial fibrillation  11. PATTERSON with cirrhosis  12. Hyperthyroidism  13. Thrombocytopenia, most likely secondary to liver disease and consumption due to acute illness  14. Supratherapeutic INR     Continue current treatment. Monitor counts. Increase activity as tolerated.  Now DNR. Labs with HD Monday. Continue blood pressure support as needed with levophed (initiate if systolic pressure is less than 60 and map is less than 40). Appreciate cardiology input. Hold coumadin for inr > 3.5.  HD per nephrology. Monitor platelets closely.      Spouse is considering comfort care, waiting to talk further with family.           Electronically signed by GLORIA Mireles on 1/12/2020 at 10:56 AM

## 2020-01-12 NOTE — PROGRESS NOTES
Nephrology ([facility]) Progress Note      Patient:  Pranay Gutierrez  YOB: 1955  Date of Service: 1/12/2020  MRN: [id number]   Acct: [id number]   Primary Care Physician: Dwaien Wiley PA-C  Advance Directive:   There are no questions and answers to display.     Admit Date: 12/12/2019       Hospital Day: 0  Referring Provider: Maxwell Gallego MD      Patient personally seen and examined.  Complete chart including Consults, Notes, Operative Reports, Labs, Cardiology, and Radiology studies reviewed as able.    Chief complaint: Acute kidney injury/dialysis dependent.    Subjective:  Pranay Gutierrez is a 64 y.o. male  whom we were consulted for SARAH on HD.  First HD 10/27.    Patient was admitted initially at [facility].  He was then transferred to [facility] where he had CRRT.  After stabilization he was transferred to long-term acute care hospital here at [place].  Due to complexity of his medical problem including cirrhosis of liver, diastolic CHF and acute kidney injury,  He was admitted to LTAC. Finally he has been off the Levophed and blood pressure is borderline low.  He is currently on midodrine 20 mg p.o. 3 times daily.  His systolic blood pressure is still 60 mmHg    This afternoon he is more unresponsive and no respond to vocal command.  His blood pressure is persistently 50-60/30 mmHg.      Allergies:  Adhesive tape; Amoxicillin; Silicone; Penicillins; and Sulfamethoxazole-trimethoprim    Home Meds:  Medications Prior to Admission   Medication Sig Dispense Refill Last Dose   • ASMANEX 120 METERED DOSES 220 MCG/INH inhaler INHALE TWO PUFFS BY MOUTH TWICE A DAY 1 inhaler 11 Taking   • atorvastatin (LIPITOR) 20 MG tablet Take 20 mg by mouth Daily.   Taking   • citalopram (CeleXA) 20 MG tablet Take 20 mg by mouth Daily.   Taking   • desonide (DESOWEN) 0.05 % cream Apply 1 application topically 2 (Two) Times a Day.   Taking   •  dofetilide (TIKOSYN) 250 MCG capsule Take 1 capsule by mouth Every 12 (Twelve) Hours. (Patient taking differently: Take 500 mcg by mouth Every 12 (Twelve) Hours.) 60 capsule 11 Taking   • furosemide (LASIX) 80 MG tablet Take 80 mg by mouth Daily.   Taking   • ipratropium (ATROVENT) 0.02 % nebulizer solution Take 500 mcg by nebulization 4 (Four) Times a Day.   Taking   • latanoprost (XALATAN) 0.005 % ophthalmic solution 1 drop Every Night.   Taking   • levalbuterol (XOPENEX HFA) 45 MCG/ACT inhaler Inhale 1-2 puffs Every 4 (Four) Hours As Needed for Wheezing.   Taking   • levalbuterol (XOPENEX) 1.25 MG/3ML nebulizer solution Take 3 mL by nebulization 3 (Three) Times a Day As Needed for Wheezing.   Taking   • magnesium oxide (MAGOX) 400 (241.3 Mg) MG tablet tablet Take 400 mg by mouth 2 (Two) Times a Day.   Taking   • Melatonin 10 MG tablet Take 10 mg by mouth Every Night.   Taking   • metOLazone (ZAROXOLYN) 2.5 MG tablet Take 2.5 mg by mouth Daily. 1 TABLET PO EVERY Monday, Wednesday & Friday   Taking   • metoprolol succinate XL (TOPROL-XL) 50 MG 24 hr tablet Take 75 mg by mouth 2 (Two) Times a Day.   Taking   • Metoprolol Tartrate 75 MG tablet Take 75 mg by mouth 2 (Two) Times a Day.   Taking   • Mometasone Furoate (ASMANEX HFA) 200 MCG/ACT aerosol Inhale 2 puffs 2 (Two) Times a Day.   Taking   • potassium chloride (K-DUR) 10 MEQ CR tablet Take 10 mEq by mouth 2 (Two) Times a Day.   Taking   • potassium chloride (K-DUR,KLOR-CON) 20 MEQ CR tablet Take 20 mEq by mouth 2 (Two) Times a Day. 30 mg am & 30 mg pm   Taking   • propylthiouracil (PTU) 50 MG tablet Take 50 mg by mouth Daily.   Taking   • spironolactone (ALDACTONE) 25 MG tablet Take 25 mg by mouth Daily.   Taking   • warfarin (COUMADIN) 2 MG tablet Take 2.5 mg by mouth Daily.   Taking       Medicines:  Current Facility-Administered Medications   Medication Dose Route Frequency Provider Last Rate Last Dose   • acetaminophen (TYLENOL) tablet 500 mg  500 mg Oral  Q6H PRN Maxwell Gallego MD       • albumin human 25 % IV SOLN 12.5 g  12.5 g Intravenous Q5 Min PRN Renny Floyd MD       • b complex-vitamin c-folic acid (NEPHRO-JENA) tablet 1 tablet  1 tablet Oral Daily Maxwell Gallego MD       • bacitracin ointment   Topical Q12H Maxwell Gallego MD       • dextrose (D50W) 25 g/ 50mL Intravenous Solution 25 g  25 g Intravenous Q15 Min PRN Maxwell Gallego MD       • dextrose (GLUTOSE) oral gel 15 g  15 g Oral Q15 Min PRN Maxwell Gallego MD       • epoetin haylee (EPOGEN,PROCRIT) injection 10,000 Units  10,000 Units Subcutaneous Weekly Sravan Adam MD       • flecainide (TAMBOCOR) tablet 25 mg  25 mg Oral Q12H Maxwell Gallego MD       • folic acid (FOLVITE) tablet 1 mg  1 mg Oral Daily Maxwell Gallego MD       • glucagon (human recombinant) (GLUCAGEN DIAGNOSTIC) injection 1 mg  1 mg Subcutaneous Q15 Min PRN Maxwell Gallego MD       • heparin (porcine) injection 2,000 Units  2,000 Units Intracatheter PRN Renny Floyd MD   2,000 Units at 12/18/19 1220   • heparin (porcine) injection 2,000 Units  2,000 Units Intracatheter PRN Renny Floyd MD   2,000 Units at 12/18/19 1221   • heparin (porcine) injection 4,100 Units  4,100 Units Intravenous Once Adriel Viera MD       • heparin (porcine) injection 4,100 Units  4,100 Units Intravenous Once Adriel Viera MD       • hypromellose (ISOPTO TEARS) 0.5 % ophthalmic solution 1 drop  1 drop Both Eyes Q4H PRN Maxwell Gallego MD       • lactulose solution 20 g  20 g Oral Daily Maxwell Gallego MD       • latanoprost (XALATAN) 0.005 % ophthalmic solution 1 drop  1 drop Left Eye Nightly Maxwell Gallego MD       • melatonin tablet 6 mg  6 mg Oral Nightly Maxwell Gallego MD       • midodrine (PROAMATINE) tablet 20 mg  20 mg Oral Q8H Sravan Adam MD       • ondansetron (ZOFRAN) tablet 4 mg  4 mg Oral Q6H PRN  Maxwell Gallego MD        Or   • ondansetron (ZOFRAN) injection 4 mg  4 mg Intravenous Q6H PRN Maxwell Gallego MD       • pantoprazole (PROTONIX) EC tablet 40 mg  40 mg Oral Q AM Maxwell Gallego MD       • riFAXIMin (XIFAXAN) tablet 550 mg  550 mg Oral Q12H Maxwell Gallego MD       • rOPINIRole (REQUIP) tablet 0.25 mg  0.25 mg Oral TID Maxwell Gallego MD       • sodium chloride nasal spray 1 spray  1 spray Each Nare Q1H PRN Maxwell Gallego MD       • vitamin A & D ointment   Topical Q12H Maxwell Gallego MD       • Meseret's amazing butt cream   Topical PRN Celina Cedeno, APRN       • Meseret's amazing butt cream   Topical TID Celina Cedeno, APRN       • Meseret's amazing butt cream   Topical BID Celina Cedeno, APRN       • [START ON 1/13/2020] warfarin (COUMADIN) tablet 1 mg  1 mg Oral Daily Maxwell Gallego MD           Past Medical History:  Past Medical History:   Diagnosis Date   • Atelectasis, left 12/9/2018   • Cerebrovascular disease, acute    • Chest pain    • Chronic atrial fibrillation (CMS/HCC)      ablation X 3; followed by Dr. Perez chronically anticoagulated with Coumadin   • Class 3 severe obesity with serious comorbidity and body mass index (BMI) of 40.0 to 44.9 in adult (CMS/HCC) 12/9/2018   • COPD (chronic obstructive pulmonary disease) (CMS/HCC)    • Diaphragm dysfunction 12/9/2018   • Glaucoma    • Hypertrophic cardiomyopathy (CMS/HCC)    • Intermittent palpitations    • Lung nodule 6/24/2019   • Mitral valve replaced    • Mixed hyperlipidemia    • Moderate persistent asthma without complication 12/9/2018   • Obstructive sleep apnea 12/9/2018   • Obstructive sleep apnea, adult    • Pacemaker    • Rash 6/24/2019    Arms and legs   • Restrictive lung disease 12/9/2018   • Stroke (CMS/HCC)     x2       Past Surgical History:  Past Surgical History:   Procedure Laterality Date   • APPENDECTOMY     • CARDIAC ABLATION  01/03/2012   •  CORONARY ARTERY BYPASS GRAFT     • HEMORROIDECTOMY     • MITRAL VALVE REPLACEMENT     • PACEMAKER IMPLANTATION      Cardiac Pacemaker Insertion   • VASECTOMY         Family History  Family History   Problem Relation Age of Onset   • Alzheimer's disease Mother    • Diabetes Father    • Stroke Father        Social History  Social History     Socioeconomic History   • Marital status:      Spouse name: Not on file   • Number of children: Not on file   • Years of education: Not on file   • Highest education level: Not on file   Tobacco Use   • Smoking status: Never Smoker   • Smokeless tobacco: Never Used   Substance and Sexual Activity   • Alcohol use: No   • Drug use: No   • Sexual activity: Defer         Review of Systems:  History obtained from chart review and the patient  General ROS: No fever or chills  Respiratory ROS: No cough, shortness of breath, wheezing  Cardiovascular ROS: No chest pain or palpitations  Gastrointestinal ROS: No abdominal pain or melena  Genito-Urinary ROS: No dysuria or hematuria    Objective:  Blood pressure: 73/42 mmHg  Heart rate is 67 bpm  O2 saturation 98%.    General: Encephalopathic/sleepy  HEENT: Normocephalic atraumatic head  Chest:  clear to auscultation bilaterally without respiratory distress  CVS: regular rate and rhythm  Abdominal: soft, nontender, positive bowel sounds  Extremities: ble edema  Skin: warm/dry with ble stasis changes      Labs:  Results from last 7 days   Lab Units 01/10/20  0448 01/08/20  0530 01/06/20  0546   WBC 10*3/mm3 5.37 7.20 8.70   HEMOGLOBIN g/dL 7.8* 8.3* 8.7*   HEMATOCRIT % 23.1* 25.0* 25.9*   PLATELETS 10*3/mm3 54* 62* 76*         Results from last 7 days   Lab Units 01/11/20  0408 01/10/20  0448 01/08/20  0530   SODIUM mmol/L 136 134* 135*   POTASSIUM mmol/L 4.0 4.0 4.1   CHLORIDE mmol/L 95* 94* 96*   CO2 mmol/L 27.0 27.0 26.0   BUN mg/dL 12 16 16   CREATININE mg/dL 4.07* 4.59* 4.76*   CALCIUM mg/dL 10.1 9.9 10.2   BILIRUBIN mg/dL 7.3*  --    --    ALK PHOS U/L 134*  --   --    ALT (SGPT) U/L 24  --   --    AST (SGOT) U/L 60*  --   --    GLUCOSE mg/dL 104* 75 95       Radiology:   Imaging Results (Last 72 Hours)     ** No results found for the last 72 hours. **          Culture:  No results found for: BLOODCX, URINECX, WOUNDCX, MRSACX, RESPCX, STOOLCX      Assessment   1.  Acute kidney injury/dialysis dependent.  2.  Stage III chronic kidney disease baseline.  3.  Hepatorenal syndrome.  4.  Acute on chronic sy diastolic CHF.  5.  Chronic thrombocytopenia.  6.  Anasarca/volume overload.  7.  Chronic hypotension    Plan:  1.  Overall prognosis looks very poor.  2.  Increase midodrine.  3.  Patient is currently DNR.  4.  Continue dialysis Monday Wednesday Friday.      Sravan Adam MD  1/12/2020  2:48 PM

## 2020-01-13 PROCEDURE — 25010000003 MORPHINE PER 10 MG: Performed by: INTERNAL MEDICINE

## 2020-01-13 NOTE — DISCHARGE SUMMARY
[facility]  RA Santillan, APRN      Internal Medicine Death Summary    Patient ID: Pranay Gutierrez  MRN: [id number]     Acct:  [id number]       Patient's PCP: Dwaine Wiley PA-C    Admit Date: 12/12/2019     Discharge Date: 1/13/2020      Admitting Physician: Maxwell Gallego MD    Discharge Physician: Maxwell Gallego MD     Final Diagnoses    Primary Problem  <principal problem not specified>      Hospital Problems    * No active hospital problems. *     Past Medical History:   Diagnosis Date   • Atelectasis, left 12/9/2018   • Cerebrovascular disease, acute    • Chest pain    • Chronic atrial fibrillation (CMS/HCC)      ablation X 3; followed by Dr. Perez chronically anticoagulated with Coumadin   • Class 3 severe obesity with serious comorbidity and body mass index (BMI) of 40.0 to 44.9 in adult (CMS/HCC) 12/9/2018   • COPD (chronic obstructive pulmonary disease) (CMS/HCC)    • Diaphragm dysfunction 12/9/2018   • Glaucoma    • Hypertrophic cardiomyopathy (CMS/HCC)    • Intermittent palpitations    • Lung nodule 6/24/2019   • Mitral valve replaced    • Mixed hyperlipidemia    • Moderate persistent asthma without complication 12/9/2018   • Obstructive sleep apnea 12/9/2018   • Obstructive sleep apnea, adult    • Pacemaker    • Rash 6/24/2019    Arms and legs   • Restrictive lung disease 12/9/2018   • Stroke (CMS/HCC)     x2         Code Status:    There are no questions and answers to display.       Hospital Course: Mr. Pranay gutierrez was admitted to the hospital on 12/12/2019 for  1. Acute on chronic diastolic CHF  2. Acute kidney injury on CKD4  3. Multifactorial anemia  4. Chronic anticoagulation  5. MINH  6. COPD  7. Hx CVA  8. Hypertrophic cardiomyopathy  9. Hx MVR  10. Atrial fibrillation  11. PATTERSON with cirrhosis  12. Hyperthyroidism  13. Thrombocytopenia, most likely secondary to liver disease and consumption due to acute illness  14. Supratherapeutic INR    During  the course of his hospitalization compliance and appropriate lifestyle and medical interventions were discussed at length.  Throughout the patient's course of hospitalization he had improvement in his hemodynamics a couple of different times however ultimately his comorbidities along with his lifestyle choices presented to large problem to be overcame.  On 2020 the patient opted for comfort care.  And on the a.m. of 2020 at 6:30 AM the patient succumbed to his illness and .    Consults:        Time Spent on discharge is  32 minutes in summary of patient care, patient/family counseling as well as medication reconciliation, prescriptions for required medications, discharge plan and follow up.     Electronically signed by Maxwell Gallego MD on 2020 at 2:59 PM

## 2020-03-05 ENCOUNTER — TELEPHONE (OUTPATIENT)
Dept: INTERNAL MEDICINE | Age: 65
End: 2020-03-05

## 2020-03-05 NOTE — TELEPHONE ENCOUNTER
Called pt to see if he was still wanting to be seen by Helen Jimenez, I was unable to reach pt by phone or leave a vm.
